# Patient Record
Sex: MALE | Race: BLACK OR AFRICAN AMERICAN | NOT HISPANIC OR LATINO | Employment: UNEMPLOYED | URBAN - METROPOLITAN AREA
[De-identification: names, ages, dates, MRNs, and addresses within clinical notes are randomized per-mention and may not be internally consistent; named-entity substitution may affect disease eponyms.]

---

## 2017-11-15 ENCOUNTER — HISTORICAL (OUTPATIENT)
Dept: ADMINISTRATIVE | Facility: HOSPITAL | Age: 37
End: 2017-11-15

## 2017-11-15 LAB
ABS NEUT (OLG): 4.02 X10(3)/MCL (ref 2.1–9.2)
ALBUMIN SERPL-MCNC: 3.8 GM/DL (ref 3.4–5)
ALBUMIN/GLOB SERPL: 1 RATIO (ref 1–2)
ALP SERPL-CCNC: 137 UNIT/L (ref 45–117)
ALT SERPL-CCNC: 25 UNIT/L (ref 12–78)
AST SERPL-CCNC: 16 UNIT/L (ref 15–37)
BASOPHILS # BLD AUTO: 0.06 X10(3)/MCL
BASOPHILS NFR BLD AUTO: 1 % (ref 0–1)
BILIRUB SERPL-MCNC: 0.5 MG/DL (ref 0.2–1)
BILIRUBIN DIRECT+TOT PNL SERPL-MCNC: 0.1 MG/DL
BILIRUBIN DIRECT+TOT PNL SERPL-MCNC: 0.4 MG/DL
BUN SERPL-MCNC: 6 MG/DL (ref 7–18)
CALCIUM SERPL-MCNC: 9.4 MG/DL (ref 8.5–10.1)
CHLORIDE SERPL-SCNC: 104 MMOL/L (ref 98–107)
CO2 SERPL-SCNC: 29 MMOL/L (ref 21–32)
CREAT SERPL-MCNC: 0.8 MG/DL (ref 0.6–1.3)
EOSINOPHIL # BLD AUTO: 0.07 X10(3)/MCL
EOSINOPHIL NFR BLD AUTO: 1 % (ref 0–5)
ERYTHROCYTE [DISTWIDTH] IN BLOOD BY AUTOMATED COUNT: 12.9 % (ref 11.5–14.5)
GLOBULIN SER-MCNC: 3.9 GM/ML (ref 2.3–3.5)
GLUCOSE SERPL-MCNC: 90 MG/DL (ref 74–106)
HCT VFR BLD AUTO: 60.1 % (ref 40–51)
HGB BLD-MCNC: 21.9 GM/DL (ref 13.5–17.5)
IMM GRANULOCYTES # BLD AUTO: 0.02 10*3/UL
IMM GRANULOCYTES NFR BLD AUTO: 0 %
LDH SERPL-CCNC: 170 UNIT/L (ref 87–241)
LYMPHOCYTES # BLD AUTO: 1.26 X10(3)/MCL
LYMPHOCYTES NFR BLD AUTO: 21 % (ref 15–40)
MCH RBC QN AUTO: 36.6 PG (ref 26–34)
MCHC RBC AUTO-ENTMCNC: 36.4 GM/DL (ref 31–37)
MCV RBC AUTO: 100.3 FL (ref 80–100)
MONOCYTES # BLD AUTO: 0.49 X10(3)/MCL
MONOCYTES NFR BLD AUTO: 8 % (ref 4–12)
NEUTROPHILS # BLD AUTO: 4.02 X10(3)/MCL
NEUTROPHILS NFR BLD AUTO: 68 X10(3)/MCL
PLATELET # BLD AUTO: 132 X10(3)/MCL (ref 130–400)
PMV BLD AUTO: 10.4 FL (ref 7.4–10.4)
POTASSIUM SERPL-SCNC: 3.8 MMOL/L (ref 3.5–5.1)
PROT SERPL-MCNC: 7.7 GM/DL (ref 6.4–8.2)
RBC # BLD AUTO: 5.99 X10(6)/MCL (ref 4.5–5.9)
SODIUM SERPL-SCNC: 140 MMOL/L (ref 136–145)
URATE SERPL-MCNC: 4.3 MG/DL (ref 3.5–7.2)
WBC # SPEC AUTO: 5.9 X10(3)/MCL (ref 4.5–11)

## 2017-11-20 ENCOUNTER — HISTORICAL (OUTPATIENT)
Dept: INFUSION THERAPY | Facility: HOSPITAL | Age: 37
End: 2017-11-20

## 2017-11-24 ENCOUNTER — HISTORICAL (OUTPATIENT)
Dept: RADIOLOGY | Facility: HOSPITAL | Age: 37
End: 2017-11-24

## 2017-11-27 ENCOUNTER — HISTORICAL (OUTPATIENT)
Dept: INFUSION THERAPY | Facility: HOSPITAL | Age: 37
End: 2017-11-27

## 2017-11-27 LAB
ABS NEUT (OLG): 2.95 X10(3)/MCL (ref 2.1–9.2)
ALBUMIN SERPL-MCNC: 3.7 GM/DL (ref 3.4–5)
ALBUMIN/GLOB SERPL: 1 RATIO (ref 1–2)
ALP SERPL-CCNC: 123 UNIT/L (ref 45–117)
ALT SERPL-CCNC: 29 UNIT/L (ref 12–78)
AST SERPL-CCNC: 24 UNIT/L (ref 15–37)
BASOPHILS # BLD AUTO: 0.06 X10(3)/MCL
BASOPHILS NFR BLD AUTO: 1 % (ref 0–1)
BILIRUB SERPL-MCNC: 0.6 MG/DL (ref 0.2–1)
BILIRUBIN DIRECT+TOT PNL SERPL-MCNC: 0.2 MG/DL
BILIRUBIN DIRECT+TOT PNL SERPL-MCNC: 0.4 MG/DL
BUN SERPL-MCNC: 5 MG/DL (ref 7–18)
CALCIUM SERPL-MCNC: 8.8 MG/DL (ref 8.5–10.1)
CHLORIDE SERPL-SCNC: 105 MMOL/L (ref 98–107)
CO2 SERPL-SCNC: 29 MMOL/L (ref 21–32)
CREAT SERPL-MCNC: 1 MG/DL (ref 0.6–1.3)
EOSINOPHIL # BLD AUTO: 0.05 10*3/UL
EOSINOPHIL NFR BLD AUTO: 1 % (ref 0–5)
ERYTHROCYTE [DISTWIDTH] IN BLOOD BY AUTOMATED COUNT: 13 % (ref 11.5–14.5)
GLOBULIN SER-MCNC: 3.7 GM/ML (ref 2.3–3.5)
GLUCOSE SERPL-MCNC: 99 MG/DL (ref 74–106)
HCT VFR BLD AUTO: 58.2 % (ref 40–51)
HGB BLD-MCNC: 21.1 GM/DL (ref 13.5–17.5)
IMM GRANULOCYTES # BLD AUTO: 0.02 10*3/UL
IMM GRANULOCYTES NFR BLD AUTO: 0 %
LYMPHOCYTES # BLD AUTO: 1.39 X10(3)/MCL
LYMPHOCYTES NFR BLD AUTO: 27 % (ref 15–40)
MCH RBC QN AUTO: 36.1 PG (ref 26–34)
MCHC RBC AUTO-ENTMCNC: 36.3 GM/DL (ref 31–37)
MCV RBC AUTO: 99.5 FL (ref 80–100)
MONOCYTES # BLD AUTO: 0.6 X10(3)/MCL
MONOCYTES NFR BLD AUTO: 12 % (ref 4–12)
NEUTROPHILS # BLD AUTO: 2.95 X10(3)/MCL
NEUTROPHILS NFR BLD AUTO: 58 X10(3)/MCL
PLATELET # BLD AUTO: 158 X10(3)/MCL (ref 130–400)
PMV BLD AUTO: 10.1 FL (ref 7.4–10.4)
POTASSIUM SERPL-SCNC: 4.1 MMOL/L (ref 3.5–5.1)
PROT SERPL-MCNC: 7.4 GM/DL (ref 6.4–8.2)
RBC # BLD AUTO: 5.85 X10(6)/MCL (ref 4.5–5.9)
SODIUM SERPL-SCNC: 140 MMOL/L (ref 136–145)
WBC # SPEC AUTO: 5.1 X10(3)/MCL (ref 4.5–11)

## 2017-12-04 ENCOUNTER — HISTORICAL (OUTPATIENT)
Dept: INFUSION THERAPY | Facility: HOSPITAL | Age: 37
End: 2017-12-04

## 2017-12-04 LAB
ABS NEUT (OLG): 3.18 X10(3)/MCL (ref 2.1–9.2)
BASOPHILS # BLD AUTO: 0.06 X10(3)/MCL
BASOPHILS NFR BLD AUTO: 1 % (ref 0–1)
EOSINOPHIL # BLD AUTO: 0.03 10*3/UL
EOSINOPHIL NFR BLD AUTO: 1 % (ref 0–5)
ERYTHROCYTE [DISTWIDTH] IN BLOOD BY AUTOMATED COUNT: 12.3 % (ref 11.5–14.5)
HCT VFR BLD AUTO: 55.1 % (ref 40–51)
HGB BLD-MCNC: 20 GM/DL (ref 13.5–17.5)
IMM GRANULOCYTES # BLD AUTO: 0.01 10*3/UL
IMM GRANULOCYTES NFR BLD AUTO: 0 %
LYMPHOCYTES # BLD AUTO: 1.45 X10(3)/MCL
LYMPHOCYTES NFR BLD AUTO: 28 % (ref 15–40)
MCH RBC QN AUTO: 35.7 PG (ref 26–34)
MCHC RBC AUTO-ENTMCNC: 36.3 GM/DL (ref 31–37)
MCV RBC AUTO: 98.2 FL (ref 80–100)
MONOCYTES # BLD AUTO: 0.47 X10(3)/MCL
MONOCYTES NFR BLD AUTO: 9 % (ref 4–12)
NEUTROPHILS # BLD AUTO: 3.18 X10(3)/MCL
NEUTROPHILS NFR BLD AUTO: 61 X10(3)/MCL
PLATELET # BLD AUTO: 197 X10(3)/MCL (ref 130–400)
PMV BLD AUTO: 10.2 FL (ref 7.4–10.4)
RBC # BLD AUTO: 5.61 X10(6)/MCL (ref 4.5–5.9)
WBC # SPEC AUTO: 5.2 X10(3)/MCL (ref 4.5–11)

## 2017-12-11 ENCOUNTER — HISTORICAL (OUTPATIENT)
Dept: INFUSION THERAPY | Facility: HOSPITAL | Age: 37
End: 2017-12-11

## 2017-12-11 LAB
ABS NEUT (OLG): 4.99 X10(3)/MCL (ref 2.1–9.2)
BASOPHILS # BLD AUTO: 0.07 X10(3)/MCL
BASOPHILS NFR BLD AUTO: 1 % (ref 0–1)
EOSINOPHIL # BLD AUTO: 0.06 X10(3)/MCL
EOSINOPHIL NFR BLD AUTO: 1 % (ref 0–5)
ERYTHROCYTE [DISTWIDTH] IN BLOOD BY AUTOMATED COUNT: 12.6 % (ref 11.5–14.5)
HCT VFR BLD AUTO: 50.3 % (ref 40–51)
HGB BLD-MCNC: 18.3 GM/DL (ref 13.5–17.5)
IMM GRANULOCYTES # BLD AUTO: 0.02 10*3/UL
IMM GRANULOCYTES NFR BLD AUTO: 0 %
LYMPHOCYTES # BLD AUTO: 1.4 X10(3)/MCL
LYMPHOCYTES NFR BLD AUTO: 20 % (ref 15–40)
MCH RBC QN AUTO: 36.3 PG (ref 26–34)
MCHC RBC AUTO-ENTMCNC: 36.4 GM/DL (ref 31–37)
MCV RBC AUTO: 99.8 FL (ref 80–100)
MONOCYTES # BLD AUTO: 0.57 X10(3)/MCL
MONOCYTES NFR BLD AUTO: 8 % (ref 4–12)
NEUTROPHILS # BLD AUTO: 4.99 X10(3)/MCL
NEUTROPHILS NFR BLD AUTO: 70 X10(3)/MCL
PLATELET # BLD AUTO: 189 X10(3)/MCL (ref 130–400)
PMV BLD AUTO: 10.4 FL (ref 7.4–10.4)
RBC # BLD AUTO: 5.04 X10(6)/MCL (ref 4.5–5.9)
WBC # SPEC AUTO: 7.1 X10(3)/MCL (ref 4.5–11)

## 2017-12-14 ENCOUNTER — HISTORICAL (OUTPATIENT)
Dept: SURGERY | Facility: HOSPITAL | Age: 37
End: 2017-12-14

## 2017-12-14 LAB
ABS NEUT (OLG): 1.54 X10(3)/MCL (ref 2.1–9.2)
BASOPHILS # BLD AUTO: 0.06 X10(3)/MCL
BASOPHILS NFR BLD AUTO: 1 % (ref 0–1)
EOSINOPHIL # BLD AUTO: 0.16 10*3/UL
EOSINOPHIL NFR BLD AUTO: 4 % (ref 0–5)
ERYTHROCYTE [DISTWIDTH] IN BLOOD BY AUTOMATED COUNT: 12.1 % (ref 11.5–14.5)
HCT VFR BLD AUTO: 47 % (ref 40–51)
HGB BLD-MCNC: 16.6 GM/DL (ref 13.5–17.5)
IMM GRANULOCYTES # BLD AUTO: 0.01 10*3/UL
IMM GRANULOCYTES NFR BLD AUTO: 0 %
INR PPP: 0.9 (ref 0.9–1.2)
LYMPHOCYTES # BLD AUTO: 1.93 X10(3)/MCL
LYMPHOCYTES NFR BLD AUTO: 45 % (ref 15–40)
MCH RBC QN AUTO: 35.1 PG (ref 26–34)
MCHC RBC AUTO-ENTMCNC: 35.3 GM/DL (ref 31–37)
MCV RBC AUTO: 99.4 FL (ref 80–100)
MONOCYTES # BLD AUTO: 0.55 X10(3)/MCL
MONOCYTES NFR BLD AUTO: 13 % (ref 4–12)
NEUTROPHILS # BLD AUTO: 1.54 X10(3)/MCL
NEUTROPHILS NFR BLD AUTO: 36 X10(3)/MCL
PLATELET # BLD AUTO: 201 X10(3)/MCL (ref 130–400)
PMV BLD AUTO: 10.5 FL (ref 7.4–10.4)
PROTHROMBIN TIME: 12 SECOND(S) (ref 11.9–14.4)
RBC # BLD AUTO: 4.73 X10(6)/MCL (ref 4.5–5.9)
WBC # SPEC AUTO: 4.2 X10(3)/MCL (ref 4.5–11)

## 2017-12-18 ENCOUNTER — HISTORICAL (OUTPATIENT)
Dept: INFUSION THERAPY | Facility: HOSPITAL | Age: 37
End: 2017-12-18

## 2017-12-18 LAB
ABS NEUT (OLG): 3.11 X10(3)/MCL (ref 2.1–9.2)
BASOPHILS # BLD AUTO: 0.06 X10(3)/MCL
BASOPHILS NFR BLD AUTO: 1 % (ref 0–1)
EOSINOPHIL # BLD AUTO: 0.12 X10(3)/MCL
EOSINOPHIL NFR BLD AUTO: 2 % (ref 0–5)
ERYTHROCYTE [DISTWIDTH] IN BLOOD BY AUTOMATED COUNT: 12.2 % (ref 11.5–14.5)
HCT VFR BLD AUTO: 47.4 % (ref 40–51)
HGB BLD-MCNC: 17.1 GM/DL (ref 13.5–17.5)
IMM GRANULOCYTES # BLD AUTO: 0.01 10*3/UL
IMM GRANULOCYTES NFR BLD AUTO: 0 %
LYMPHOCYTES # BLD AUTO: 1.7 X10(3)/MCL
LYMPHOCYTES NFR BLD AUTO: 31 % (ref 15–40)
MCH RBC QN AUTO: 35.6 PG (ref 26–34)
MCHC RBC AUTO-ENTMCNC: 36.1 GM/DL (ref 31–37)
MCV RBC AUTO: 98.5 FL (ref 80–100)
MONOCYTES # BLD AUTO: 0.45 X10(3)/MCL
MONOCYTES NFR BLD AUTO: 8 % (ref 4–12)
NEUTROPHILS # BLD AUTO: 3.11 X10(3)/MCL
NEUTROPHILS NFR BLD AUTO: 57 X10(3)/MCL
PLATELET # BLD AUTO: 198 X10(3)/MCL (ref 130–400)
PMV BLD AUTO: 10.9 FL (ref 7.4–10.4)
RBC # BLD AUTO: 4.81 X10(6)/MCL (ref 4.5–5.9)
WBC # SPEC AUTO: 5.4 X10(3)/MCL (ref 4.5–11)

## 2017-12-26 ENCOUNTER — HISTORICAL (OUTPATIENT)
Dept: INFUSION THERAPY | Facility: HOSPITAL | Age: 37
End: 2017-12-26

## 2017-12-26 LAB
ABS NEUT (OLG): 3.81 X10(3)/MCL (ref 2.1–9.2)
BASOPHILS # BLD AUTO: 0.08 X10(3)/MCL
BASOPHILS NFR BLD AUTO: 1 % (ref 0–1)
EOSINOPHIL # BLD AUTO: 0.03 X10(3)/MCL
EOSINOPHIL NFR BLD AUTO: 0 % (ref 0–5)
ERYTHROCYTE [DISTWIDTH] IN BLOOD BY AUTOMATED COUNT: 11.9 % (ref 11.5–14.5)
HCT VFR BLD AUTO: 44.4 % (ref 40–51)
HGB BLD-MCNC: 15.9 GM/DL (ref 13.5–17.5)
IMM GRANULOCYTES # BLD AUTO: 0.02 10*3/UL
IMM GRANULOCYTES NFR BLD AUTO: 0 %
LYMPHOCYTES # BLD AUTO: 1.57 X10(3)/MCL
LYMPHOCYTES NFR BLD AUTO: 26 % (ref 15–40)
MCH RBC QN AUTO: 34.8 PG (ref 26–34)
MCHC RBC AUTO-ENTMCNC: 35.8 GM/DL (ref 31–37)
MCV RBC AUTO: 97.2 FL (ref 80–100)
MONOCYTES # BLD AUTO: 0.54 X10(3)/MCL
MONOCYTES NFR BLD AUTO: 9 % (ref 4–12)
NEUTROPHILS # BLD AUTO: 3.81 X10(3)/MCL
NEUTROPHILS NFR BLD AUTO: 63 X10(3)/MCL
PLATELET # BLD AUTO: 203 X10(3)/MCL (ref 130–400)
PMV BLD AUTO: 10.3 FL (ref 7.4–10.4)
RBC # BLD AUTO: 4.57 X10(6)/MCL (ref 4.5–5.9)
WBC # SPEC AUTO: 6 X10(3)/MCL (ref 4.5–11)

## 2018-01-02 ENCOUNTER — HISTORICAL (OUTPATIENT)
Dept: INFUSION THERAPY | Facility: HOSPITAL | Age: 38
End: 2018-01-02

## 2018-01-02 LAB
ABS NEUT (OLG): 3.53 X10(3)/MCL (ref 2.1–9.2)
ALBUMIN SERPL-MCNC: 3.8 GM/DL (ref 3.4–5)
ALBUMIN/GLOB SERPL: 1 RATIO (ref 1–2)
ALP SERPL-CCNC: 112 UNIT/L (ref 45–117)
ALT SERPL-CCNC: 18 UNIT/L (ref 12–78)
AST SERPL-CCNC: 21 UNIT/L (ref 15–37)
BASOPHILS # BLD AUTO: 0.05 X10(3)/MCL
BASOPHILS NFR BLD AUTO: 1 % (ref 0–1)
BILIRUB SERPL-MCNC: 0.5 MG/DL (ref 0.2–1)
BILIRUBIN DIRECT+TOT PNL SERPL-MCNC: 0.1 MG/DL
BILIRUBIN DIRECT+TOT PNL SERPL-MCNC: 0.4 MG/DL
BUN SERPL-MCNC: 7 MG/DL (ref 7–18)
CALCIUM SERPL-MCNC: 8.9 MG/DL (ref 8.5–10.1)
CHLORIDE SERPL-SCNC: 105 MMOL/L (ref 98–107)
CO2 SERPL-SCNC: 26 MMOL/L (ref 21–32)
CREAT SERPL-MCNC: 0.9 MG/DL (ref 0.6–1.3)
EOSINOPHIL # BLD AUTO: 0.11 10*3/UL
EOSINOPHIL NFR BLD AUTO: 2 % (ref 0–5)
ERYTHROCYTE [DISTWIDTH] IN BLOOD BY AUTOMATED COUNT: 11.3 % (ref 11.5–14.5)
GLOBULIN SER-MCNC: 4 GM/ML (ref 2.3–3.5)
GLUCOSE SERPL-MCNC: 92 MG/DL (ref 74–106)
HCT VFR BLD AUTO: 42.1 % (ref 40–51)
HGB BLD-MCNC: 15.1 GM/DL (ref 13.5–17.5)
IMM GRANULOCYTES # BLD AUTO: 0.01 10*3/UL
IMM GRANULOCYTES NFR BLD AUTO: 0 %
LYMPHOCYTES # BLD AUTO: 1.2 X10(3)/MCL
LYMPHOCYTES NFR BLD AUTO: 22 % (ref 15–40)
MCH RBC QN AUTO: 33.9 PG (ref 26–34)
MCHC RBC AUTO-ENTMCNC: 35.9 GM/DL (ref 31–37)
MCV RBC AUTO: 94.6 FL (ref 80–100)
MONOCYTES # BLD AUTO: 0.47 X10(3)/MCL
MONOCYTES NFR BLD AUTO: 9 % (ref 4–12)
NEUTROPHILS # BLD AUTO: 3.53 X10(3)/MCL
NEUTROPHILS NFR BLD AUTO: 66 X10(3)/MCL
PLATELET # BLD AUTO: 212 X10(3)/MCL (ref 130–400)
PMV BLD AUTO: 9.9 FL (ref 7.4–10.4)
POTASSIUM SERPL-SCNC: 3.7 MMOL/L (ref 3.5–5.1)
PROT SERPL-MCNC: 7.8 GM/DL (ref 6.4–8.2)
RBC # BLD AUTO: 4.45 X10(6)/MCL (ref 4.5–5.9)
SODIUM SERPL-SCNC: 140 MMOL/L (ref 136–145)
WBC # SPEC AUTO: 5.4 X10(3)/MCL (ref 4.5–11)

## 2018-01-08 ENCOUNTER — HISTORICAL (OUTPATIENT)
Dept: INFUSION THERAPY | Facility: HOSPITAL | Age: 38
End: 2018-01-08

## 2018-01-08 LAB
ABS NEUT (OLG): 2.23 X10(3)/MCL (ref 2.1–9.2)
BASOPHILS # BLD AUTO: 0.09 X10(3)/MCL
BASOPHILS NFR BLD AUTO: 2 % (ref 0–1)
EOSINOPHIL # BLD AUTO: 0.3 X10(3)/MCL
EOSINOPHIL NFR BLD AUTO: 6 % (ref 0–5)
ERYTHROCYTE [DISTWIDTH] IN BLOOD BY AUTOMATED COUNT: 11.3 % (ref 11.5–14.5)
HCT VFR BLD AUTO: 37 % (ref 40–51)
HGB BLD-MCNC: 13.3 GM/DL (ref 13.5–17.5)
IMM GRANULOCYTES # BLD AUTO: 0.01 10*3/UL
IMM GRANULOCYTES NFR BLD AUTO: 0 %
LYMPHOCYTES # BLD AUTO: 1.49 X10(3)/MCL
LYMPHOCYTES NFR BLD AUTO: 32 % (ref 15–40)
MCH RBC QN AUTO: 34.2 PG (ref 26–34)
MCHC RBC AUTO-ENTMCNC: 35.9 GM/DL (ref 31–37)
MCV RBC AUTO: 95.1 FL (ref 80–100)
MONOCYTES # BLD AUTO: 0.47 X10(3)/MCL
MONOCYTES NFR BLD AUTO: 10 % (ref 4–12)
NEUTROPHILS # BLD AUTO: 2.23 X10(3)/MCL
NEUTROPHILS NFR BLD AUTO: 49 X10(3)/MCL
PLATELET # BLD AUTO: 235 X10(3)/MCL (ref 130–400)
PMV BLD AUTO: 10.1 FL (ref 7.4–10.4)
RBC # BLD AUTO: 3.89 X10(6)/MCL (ref 4.5–5.9)
WBC # SPEC AUTO: 4.6 X10(3)/MCL (ref 4.5–11)

## 2018-01-15 ENCOUNTER — HISTORICAL (OUTPATIENT)
Dept: ADMINISTRATIVE | Facility: HOSPITAL | Age: 38
End: 2018-01-15

## 2018-01-15 LAB
ABS NEUT (OLG): 2.71 X10(3)/MCL (ref 2.1–9.2)
ALBUMIN SERPL-MCNC: 3.8 GM/DL (ref 3.4–5)
ALBUMIN/GLOB SERPL: 1 RATIO (ref 1–2)
ALP SERPL-CCNC: 107 UNIT/L (ref 45–117)
ALT SERPL-CCNC: 19 UNIT/L (ref 12–78)
AST SERPL-CCNC: 17 UNIT/L (ref 15–37)
BASOPHILS # BLD AUTO: 0.09 X10(3)/MCL
BASOPHILS NFR BLD AUTO: 2 % (ref 0–1)
BILIRUB SERPL-MCNC: 0.3 MG/DL (ref 0.2–1)
BILIRUBIN DIRECT+TOT PNL SERPL-MCNC: <0.1 MG/DL
BILIRUBIN DIRECT+TOT PNL SERPL-MCNC: ABNORMAL MG/DL
BUN SERPL-MCNC: 9 MG/DL (ref 7–18)
CALCIUM SERPL-MCNC: 8.6 MG/DL (ref 8.5–10.1)
CHLORIDE SERPL-SCNC: 106 MMOL/L (ref 98–107)
CO2 SERPL-SCNC: 27 MMOL/L (ref 21–32)
CREAT SERPL-MCNC: 1 MG/DL (ref 0.6–1.3)
EOSINOPHIL # BLD AUTO: 0.22 X10(3)/MCL
EOSINOPHIL NFR BLD AUTO: 4 % (ref 0–5)
ERYTHROCYTE [DISTWIDTH] IN BLOOD BY AUTOMATED COUNT: 11.3 % (ref 11.5–14.5)
GLOBULIN SER-MCNC: 4 GM/ML (ref 2.3–3.5)
GLUCOSE SERPL-MCNC: 104 MG/DL (ref 74–106)
HCT VFR BLD AUTO: 38.7 % (ref 40–51)
HGB BLD-MCNC: 13.7 GM/DL (ref 13.5–17.5)
IMM GRANULOCYTES # BLD AUTO: 0.01 10*3/UL
IMM GRANULOCYTES NFR BLD AUTO: 0 %
LYMPHOCYTES # BLD AUTO: 1.54 X10(3)/MCL
LYMPHOCYTES NFR BLD AUTO: 31 % (ref 15–40)
MCH RBC QN AUTO: 32.9 PG (ref 26–34)
MCHC RBC AUTO-ENTMCNC: 35.4 GM/DL (ref 31–37)
MCV RBC AUTO: 93 FL (ref 80–100)
MONOCYTES # BLD AUTO: 0.43 X10(3)/MCL
MONOCYTES NFR BLD AUTO: 9 % (ref 4–12)
NEUTROPHILS # BLD AUTO: 2.71 X10(3)/MCL
NEUTROPHILS NFR BLD AUTO: 54 X10(3)/MCL
PLATELET # BLD AUTO: 221 X10(3)/MCL (ref 130–400)
PMV BLD AUTO: 10.1 FL (ref 7.4–10.4)
POTASSIUM SERPL-SCNC: 3.7 MMOL/L (ref 3.5–5.1)
PROT SERPL-MCNC: 7.8 GM/DL (ref 6.4–8.2)
RBC # BLD AUTO: 4.16 X10(6)/MCL (ref 4.5–5.9)
SODIUM SERPL-SCNC: 142 MMOL/L (ref 136–145)
WBC # SPEC AUTO: 5 X10(3)/MCL (ref 4.5–11)

## 2018-02-15 ENCOUNTER — HISTORICAL (OUTPATIENT)
Dept: HEMATOLOGY/ONCOLOGY | Facility: CLINIC | Age: 38
End: 2018-02-15

## 2018-02-15 LAB
ABS NEUT (OLG): 1.68 X10(3)/MCL (ref 2.1–9.2)
BASOPHILS # BLD AUTO: 0.09 X10(3)/MCL
BASOPHILS NFR BLD AUTO: 2 % (ref 0–1)
EOSINOPHIL # BLD AUTO: 0.29 X10(3)/MCL
EOSINOPHIL NFR BLD AUTO: 7 % (ref 0–5)
ERYTHROCYTE [DISTWIDTH] IN BLOOD BY AUTOMATED COUNT: 12.1 % (ref 11.5–14.5)
HCT VFR BLD AUTO: 38 % (ref 40–51)
HGB BLD-MCNC: 13.5 GM/DL (ref 13.5–17.5)
IMM GRANULOCYTES # BLD AUTO: 0.01 10*3/UL
IMM GRANULOCYTES NFR BLD AUTO: 0 %
LYMPHOCYTES # BLD AUTO: 1.51 X10(3)/MCL
LYMPHOCYTES NFR BLD AUTO: 38 % (ref 15–40)
MCH RBC QN AUTO: 31.7 PG (ref 26–34)
MCHC RBC AUTO-ENTMCNC: 35.5 GM/DL (ref 31–37)
MCV RBC AUTO: 89.2 FL (ref 80–100)
MONOCYTES # BLD AUTO: 0.44 X10(3)/MCL
MONOCYTES NFR BLD AUTO: 11 % (ref 4–12)
NEUTROPHILS # BLD AUTO: 1.68 X10(3)/MCL
NEUTROPHILS NFR BLD AUTO: 42 X10(3)/MCL
PLATELET # BLD AUTO: 196 X10(3)/MCL (ref 130–400)
PMV BLD AUTO: 9.7 FL (ref 7.4–10.4)
RBC # BLD AUTO: 4.26 X10(6)/MCL (ref 4.5–5.9)
WBC # SPEC AUTO: 4 X10(3)/MCL (ref 4.5–11)

## 2018-04-19 ENCOUNTER — HISTORICAL (OUTPATIENT)
Dept: ADMINISTRATIVE | Facility: HOSPITAL | Age: 38
End: 2018-04-19

## 2018-04-19 LAB
ABS NEUT (OLG): 2.35 X10(3)/MCL (ref 2.1–9.2)
ALBUMIN SERPL-MCNC: 3.6 GM/DL (ref 3.4–5)
ALBUMIN/GLOB SERPL: 1 RATIO (ref 1–2)
ALP SERPL-CCNC: 116 UNIT/L (ref 45–117)
ALT SERPL-CCNC: 50 UNIT/L (ref 12–78)
AST SERPL-CCNC: 50 UNIT/L (ref 15–37)
BASOPHILS # BLD AUTO: 0.05 X10(3)/MCL
BASOPHILS NFR BLD AUTO: 1 %
BILIRUB SERPL-MCNC: 0.4 MG/DL (ref 0.2–1)
BILIRUBIN DIRECT+TOT PNL SERPL-MCNC: 0.1 MG/DL
BILIRUBIN DIRECT+TOT PNL SERPL-MCNC: 0.3 MG/DL
BUN SERPL-MCNC: 8 MG/DL (ref 7–18)
CALCIUM SERPL-MCNC: 8.5 MG/DL (ref 8.5–10.1)
CHLORIDE SERPL-SCNC: 110 MMOL/L (ref 98–107)
CO2 SERPL-SCNC: 26 MMOL/L (ref 21–32)
CREAT SERPL-MCNC: 1 MG/DL (ref 0.6–1.3)
EOSINOPHIL # BLD AUTO: 0.09 10*3/UL
EOSINOPHIL NFR BLD AUTO: 2 %
ERYTHROCYTE [DISTWIDTH] IN BLOOD BY AUTOMATED COUNT: 13 % (ref 11.5–14.5)
GLOBULIN SER-MCNC: 4.1 GM/ML (ref 2.3–3.5)
GLUCOSE SERPL-MCNC: 112 MG/DL (ref 74–106)
HCT VFR BLD AUTO: 41.8 % (ref 40–51)
HGB BLD-MCNC: 14.5 GM/DL (ref 13.5–17.5)
IMM GRANULOCYTES # BLD AUTO: 0.01 10*3/UL
IMM GRANULOCYTES NFR BLD AUTO: 0 %
LYMPHOCYTES # BLD AUTO: 1.31 X10(3)/MCL
LYMPHOCYTES NFR BLD AUTO: 30 % (ref 13–40)
MCH RBC QN AUTO: 30.1 PG (ref 26–34)
MCHC RBC AUTO-ENTMCNC: 34.7 GM/DL (ref 31–37)
MCV RBC AUTO: 86.9 FL (ref 80–100)
MONOCYTES # BLD AUTO: 0.49 X10(3)/MCL
MONOCYTES NFR BLD AUTO: 11 % (ref 4–12)
NEUTROPHILS # BLD AUTO: 2.35 X10(3)/MCL
NEUTROPHILS NFR BLD AUTO: 55 X10(3)/MCL
PLATELET # BLD AUTO: 197 X10(3)/MCL (ref 130–400)
PMV BLD AUTO: 10 FL (ref 7.4–10.4)
POTASSIUM SERPL-SCNC: 3.6 MMOL/L (ref 3.5–5.1)
PROT SERPL-MCNC: 7.7 GM/DL (ref 6.4–8.2)
RBC # BLD AUTO: 4.81 X10(6)/MCL (ref 4.5–5.9)
SODIUM SERPL-SCNC: 142 MMOL/L (ref 136–145)
WBC # SPEC AUTO: 4.3 X10(3)/MCL (ref 4.5–11)

## 2018-07-23 ENCOUNTER — HISTORICAL (OUTPATIENT)
Dept: ADMINISTRATIVE | Facility: HOSPITAL | Age: 38
End: 2018-07-23

## 2018-07-23 LAB
ABS NEUT (OLG): 1.97 X10(3)/MCL (ref 2.1–9.2)
ALBUMIN SERPL-MCNC: 3.7 GM/DL (ref 3.4–5)
ALBUMIN/GLOB SERPL: 1 RATIO (ref 1–2)
ALP SERPL-CCNC: 122 UNIT/L (ref 45–117)
ALT SERPL-CCNC: 66 UNIT/L (ref 12–78)
AST SERPL-CCNC: 67 UNIT/L (ref 15–37)
BASOPHILS # BLD AUTO: 0.07 X10(3)/MCL
BASOPHILS NFR BLD AUTO: 2 %
BILIRUB SERPL-MCNC: 0.3 MG/DL (ref 0.2–1)
BILIRUBIN DIRECT+TOT PNL SERPL-MCNC: <0.1 MG/DL
BILIRUBIN DIRECT+TOT PNL SERPL-MCNC: ABNORMAL MG/DL
BUN SERPL-MCNC: 10 MG/DL (ref 7–18)
CALCIUM SERPL-MCNC: 8.3 MG/DL (ref 8.5–10.1)
CHLORIDE SERPL-SCNC: 107 MMOL/L (ref 98–107)
CO2 SERPL-SCNC: 25 MMOL/L (ref 21–32)
CREAT SERPL-MCNC: 1.1 MG/DL (ref 0.6–1.3)
EOSINOPHIL # BLD AUTO: 0.13 X10(3)/MCL
EOSINOPHIL NFR BLD AUTO: 3 %
ERYTHROCYTE [DISTWIDTH] IN BLOOD BY AUTOMATED COUNT: 12.3 % (ref 11.5–14.5)
GLOBULIN SER-MCNC: 4.1 GM/ML (ref 2.3–3.5)
GLUCOSE SERPL-MCNC: 104 MG/DL (ref 74–106)
HCT VFR BLD AUTO: 46.5 % (ref 40–51)
HGB BLD-MCNC: 15.8 GM/DL (ref 13.5–17.5)
IMM GRANULOCYTES # BLD AUTO: 0.01 10*3/UL
IMM GRANULOCYTES NFR BLD AUTO: 0 %
LYMPHOCYTES # BLD AUTO: 1.52 X10(3)/MCL
LYMPHOCYTES NFR BLD AUTO: 36 % (ref 13–40)
MCH RBC QN AUTO: 29.8 PG (ref 26–34)
MCHC RBC AUTO-ENTMCNC: 34 GM/DL (ref 31–37)
MCV RBC AUTO: 87.7 FL (ref 80–100)
MONOCYTES # BLD AUTO: 0.57 X10(3)/MCL
MONOCYTES NFR BLD AUTO: 13 % (ref 4–12)
NEUTROPHILS # BLD AUTO: 1.97 X10(3)/MCL
NEUTROPHILS NFR BLD AUTO: 46 X10(3)/MCL
PLATELET # BLD AUTO: 184 X10(3)/MCL (ref 130–400)
PMV BLD AUTO: 10.7 FL (ref 7.4–10.4)
POTASSIUM SERPL-SCNC: 4.1 MMOL/L (ref 3.5–5.1)
PROT SERPL-MCNC: 7.8 GM/DL (ref 6.4–8.2)
RBC # BLD AUTO: 5.3 X10(6)/MCL (ref 4.5–5.9)
SODIUM SERPL-SCNC: 140 MMOL/L (ref 136–145)
WBC # SPEC AUTO: 4.3 X10(3)/MCL (ref 4.5–11)

## 2019-03-12 ENCOUNTER — HISTORICAL (OUTPATIENT)
Dept: INFUSION THERAPY | Facility: HOSPITAL | Age: 39
End: 2019-03-12

## 2019-07-15 ENCOUNTER — HISTORICAL (OUTPATIENT)
Dept: INFUSION THERAPY | Facility: HOSPITAL | Age: 39
End: 2019-07-15

## 2019-10-14 ENCOUNTER — HISTORICAL (OUTPATIENT)
Dept: INFUSION THERAPY | Facility: HOSPITAL | Age: 39
End: 2019-10-14

## 2020-04-17 ENCOUNTER — HISTORICAL (OUTPATIENT)
Dept: HEMATOLOGY/ONCOLOGY | Facility: CLINIC | Age: 40
End: 2020-04-17

## 2020-04-21 ENCOUNTER — HISTORICAL (OUTPATIENT)
Dept: INFUSION THERAPY | Facility: HOSPITAL | Age: 40
End: 2020-04-21

## 2020-07-09 ENCOUNTER — HISTORICAL (OUTPATIENT)
Dept: INFUSION THERAPY | Facility: HOSPITAL | Age: 40
End: 2020-07-09

## 2020-07-13 ENCOUNTER — HISTORICAL (OUTPATIENT)
Dept: INFUSION THERAPY | Facility: HOSPITAL | Age: 40
End: 2020-07-13

## 2020-07-20 ENCOUNTER — HISTORICAL (OUTPATIENT)
Dept: INFUSION THERAPY | Facility: HOSPITAL | Age: 40
End: 2020-07-20

## 2020-07-27 ENCOUNTER — HISTORICAL (OUTPATIENT)
Dept: INFUSION THERAPY | Facility: HOSPITAL | Age: 40
End: 2020-07-27

## 2020-08-03 ENCOUNTER — HISTORICAL (OUTPATIENT)
Dept: INFUSION THERAPY | Facility: HOSPITAL | Age: 40
End: 2020-08-03

## 2020-08-11 ENCOUNTER — HISTORICAL (OUTPATIENT)
Dept: INFUSION THERAPY | Facility: HOSPITAL | Age: 40
End: 2020-08-11

## 2020-08-17 ENCOUNTER — HISTORICAL (OUTPATIENT)
Dept: ADMINISTRATIVE | Facility: HOSPITAL | Age: 40
End: 2020-08-17

## 2020-09-14 ENCOUNTER — HISTORICAL (OUTPATIENT)
Dept: HEMATOLOGY/ONCOLOGY | Facility: CLINIC | Age: 40
End: 2020-09-14

## 2020-12-14 ENCOUNTER — HISTORICAL (OUTPATIENT)
Dept: INFUSION THERAPY | Facility: HOSPITAL | Age: 40
End: 2020-12-14

## 2021-01-04 ENCOUNTER — HISTORICAL (OUTPATIENT)
Dept: HEMATOLOGY/ONCOLOGY | Facility: CLINIC | Age: 41
End: 2021-01-04

## 2021-04-19 ENCOUNTER — HISTORICAL (OUTPATIENT)
Dept: INFUSION THERAPY | Facility: HOSPITAL | Age: 41
End: 2021-04-19

## 2021-05-17 ENCOUNTER — HISTORICAL (OUTPATIENT)
Dept: HEMATOLOGY/ONCOLOGY | Facility: CLINIC | Age: 41
End: 2021-05-17

## 2021-07-06 ENCOUNTER — HISTORICAL (OUTPATIENT)
Dept: HEMATOLOGY/ONCOLOGY | Facility: CLINIC | Age: 41
End: 2021-07-06

## 2021-08-16 ENCOUNTER — HISTORICAL (OUTPATIENT)
Dept: INFUSION THERAPY | Facility: HOSPITAL | Age: 41
End: 2021-08-16

## 2021-11-22 ENCOUNTER — HISTORICAL (OUTPATIENT)
Dept: INFUSION THERAPY | Facility: HOSPITAL | Age: 41
End: 2021-11-22

## 2022-01-24 ENCOUNTER — HISTORICAL (OUTPATIENT)
Dept: ADMINISTRATIVE | Facility: HOSPITAL | Age: 42
End: 2022-01-24

## 2022-02-07 ENCOUNTER — HISTORICAL (OUTPATIENT)
Dept: INFUSION THERAPY | Facility: HOSPITAL | Age: 42
End: 2022-02-07

## 2022-02-07 LAB
ABS NEUT (OLG): 3.16 (ref 2.1–9.2)
ALBUMIN SERPL-MCNC: 3.9 G/DL (ref 3.5–5)
ALBUMIN/GLOB SERPL: 1.1 {RATIO} (ref 1.1–2)
ALP SERPL-CCNC: 94 U/L (ref 40–150)
ALT SERPL-CCNC: 16 U/L (ref 0–55)
AST SERPL-CCNC: 19 U/L (ref 5–34)
BASOPHILS # BLD AUTO: 0 10*3/UL (ref 0–0.2)
BASOPHILS NFR BLD AUTO: 1 %
BILIRUB SERPL-MCNC: 0.4 MG/DL
BILIRUBIN DIRECT+TOT PNL SERPL-MCNC: 0.2 (ref 0–0.5)
BILIRUBIN DIRECT+TOT PNL SERPL-MCNC: 0.2 (ref 0–0.8)
BUN SERPL-MCNC: 7.7 MG/DL (ref 8.9–20.6)
CALCIUM SERPL-MCNC: 9.4 MG/DL (ref 8.7–10.5)
CHLORIDE SERPL-SCNC: 107 MMOL/L (ref 98–107)
CO2 SERPL-SCNC: 23 MMOL/L (ref 22–29)
CREAT SERPL-MCNC: 1.01 MG/DL (ref 0.73–1.18)
EOSINOPHIL # BLD AUTO: 0 10*3/UL (ref 0–0.9)
EOSINOPHIL NFR BLD AUTO: 1 %
ERYTHROCYTE [DISTWIDTH] IN BLOOD BY AUTOMATED COUNT: 15.9 % (ref 11.5–14.5)
FLAG2 (OHS): 60
FLAG3 (OHS): 80
FLAGS (OHS): 80
GLOBULIN SER-MCNC: 3.6 G/DL (ref 2.4–3.5)
GLUCOSE SERPL-MCNC: 88 MG/DL (ref 74–100)
HCT VFR BLD AUTO: 43.8 % (ref 40–51)
HEMOLYSIS INTERF INDEX SERPL-ACNC: 12
HGB BLD-MCNC: 14.1 G/DL (ref 13.5–17.5)
ICTERIC INTERF INDEX SERPL-ACNC: 0
IMM GRANULOCYTES # BLD AUTO: 0.01 10*3/UL
IMM GRANULOCYTES NFR BLD AUTO: 0 %
IMM. NE 2 SUSPECT FLAG (OHS): 10
LIPEMIC INTERF INDEX SERPL-ACNC: 3
LOW EVENT # SUSPECT FLAG (OHS): 80
LYMPHOCYTES # BLD AUTO: 1.3 10*3/UL (ref 0.6–4.6)
LYMPHOCYTES NFR BLD AUTO: 25 %
MANUAL DIFF? (OHS): NO
MCH RBC QN AUTO: 26.9 PG (ref 26–34)
MCHC RBC AUTO-ENTMCNC: 32.2 G/DL (ref 31–37)
MCV RBC AUTO: 83.6 FL (ref 80–100)
MO BLASTS SUSPECT FLAG (OHS): 30
MONOCYTES # BLD AUTO: 0.5 10*3/UL (ref 0.1–1.3)
MONOCYTES NFR BLD AUTO: 10 %
NEUTROPHILS # BLD AUTO: 3.16 10*3/UL (ref 2.1–9.2)
NEUTROPHILS NFR BLD AUTO: 63 %
NRBC BLD AUTO-RTO: 0 % (ref 0–0.2)
PLATELET # BLD AUTO: 259 10*3/UL (ref 130–400)
PLATELET CLUMPS SUSPECT FLAG (OHS): 10
PMV BLD AUTO: 9.6 FL (ref 7.4–10.4)
POTASSIUM SERPL-SCNC: 4 MMOL/L (ref 3.5–5.1)
PROT SERPL-MCNC: 7.5 G/DL (ref 6.4–8.3)
RBC # BLD AUTO: 5.24 10*6/UL (ref 4.5–5.9)
SODIUM SERPL-SCNC: 140 MMOL/L (ref 136–145)
WBC # SPEC AUTO: 5 10*3/UL (ref 4.5–11)

## 2022-02-21 ENCOUNTER — HISTORICAL (OUTPATIENT)
Dept: ADMINISTRATIVE | Facility: HOSPITAL | Age: 42
End: 2022-02-21

## 2022-02-21 LAB
ABS NEUT (OLG): 2.68 (ref 2.1–9.2)
BASOPHILS # BLD AUTO: 0 10*3/UL (ref 0–0.2)
BASOPHILS NFR BLD AUTO: 1 %
EOSINOPHIL # BLD AUTO: 0.1 10*3/UL (ref 0–0.9)
EOSINOPHIL NFR BLD AUTO: 1 %
ERYTHROCYTE [DISTWIDTH] IN BLOOD BY AUTOMATED COUNT: 15.3 % (ref 11.5–14.5)
FLAG2 (OHS): 60
FLAG3 (OHS): 80
FLAGS (OHS): 90
HCT VFR BLD AUTO: 44.1 % (ref 40–51)
HGB BLD-MCNC: 13.8 G/DL (ref 13.5–17.5)
IMM GRANULOCYTES # BLD AUTO: 0.01 10*3/UL
IMM GRANULOCYTES NFR BLD AUTO: 0 %
IMM. NE 2 SUSPECT FLAG (OHS): 30
LOW EVENT # SUSPECT FLAG (OHS): 90
LYMPHOCYTES # BLD AUTO: 1 10*3/UL (ref 0.6–4.6)
LYMPHOCYTES NFR BLD AUTO: 25 %
MANUAL DIFF? (OHS): NO
MCH RBC QN AUTO: 25.9 PG (ref 26–34)
MCHC RBC AUTO-ENTMCNC: 31.3 G/DL (ref 31–37)
MCV RBC AUTO: 82.9 FL (ref 80–100)
MO BLASTS SUSPECT FLAG (OHS): 40
MONOCYTES # BLD AUTO: 0.4 10*3/UL (ref 0.1–1.3)
MONOCYTES NFR BLD AUTO: 9 %
NEUTROPHILS # BLD AUTO: 2.68 10*3/UL (ref 2.1–9.2)
NEUTROPHILS NFR BLD AUTO: 64 %
NRBC BLD AUTO-RTO: 0 % (ref 0–0.2)
PLATELET # BLD AUTO: 254 10*3/UL (ref 130–400)
PMV BLD AUTO: 9.6 FL (ref 7.4–10.4)
RBC # BLD AUTO: 5.32 10*6/UL (ref 4.5–5.9)
WBC # SPEC AUTO: 4.2 10*3/UL (ref 4.5–11)

## 2022-02-22 ENCOUNTER — HISTORICAL (OUTPATIENT)
Dept: ADMINISTRATIVE | Facility: HOSPITAL | Age: 42
End: 2022-02-22

## 2022-03-14 ENCOUNTER — HISTORICAL (OUTPATIENT)
Dept: ADMINISTRATIVE | Facility: HOSPITAL | Age: 42
End: 2022-03-14

## 2022-03-14 ENCOUNTER — HISTORICAL (OUTPATIENT)
Dept: RADIOLOGY | Facility: HOSPITAL | Age: 42
End: 2022-03-14

## 2022-03-21 ENCOUNTER — HISTORICAL (OUTPATIENT)
Dept: INFUSION THERAPY | Facility: HOSPITAL | Age: 42
End: 2022-03-21

## 2022-03-21 LAB
ABS NEUT (OLG): 4.96 (ref 2.1–9.2)
BASOPHILS # BLD AUTO: 0 10*3/UL (ref 0–0.2)
BASOPHILS NFR BLD AUTO: 1 %
EOSINOPHIL # BLD AUTO: 0 10*3/UL (ref 0–0.9)
EOSINOPHIL NFR BLD AUTO: 0 %
ERYTHROCYTE [DISTWIDTH] IN BLOOD BY AUTOMATED COUNT: 15.8 % (ref 11.5–14.5)
FLAG2 (OHS): 60
FLAG3 (OHS): 80
FLAGS (OHS): 90
HCT VFR BLD AUTO: 45.2 % (ref 40–51)
HGB BLD-MCNC: 14.5 G/DL (ref 13.5–17.5)
IMM GRANULOCYTES # BLD AUTO: 0.02 10*3/UL
IMM GRANULOCYTES NFR BLD AUTO: 0 %
LOW EVENT # SUSPECT FLAG (OHS): 90
LYMPHOCYTES # BLD AUTO: 1.3 10*3/UL (ref 0.6–4.6)
LYMPHOCYTES NFR BLD AUTO: 19 %
MANUAL DIFF? (OHS): NO
MCH RBC QN AUTO: 26.1 PG (ref 26–34)
MCHC RBC AUTO-ENTMCNC: 32.1 G/DL (ref 31–37)
MCV RBC AUTO: 81.4 FL (ref 80–100)
MO BLASTS SUSPECT FLAG (OHS): 30
MONOCYTES # BLD AUTO: 0.4 10*3/UL (ref 0.1–1.3)
MONOCYTES NFR BLD AUTO: 7 %
NEUTROPHILS # BLD AUTO: 4.96 10*3/UL (ref 2.1–9.2)
NEUTROPHILS NFR BLD AUTO: 73 %
NRBC BLD AUTO-RTO: 0 % (ref 0–0.2)
PLATELET # BLD AUTO: 253 10*3/UL (ref 130–400)
PLATELET CLUMPS SUSPECT FLAG (OHS): 10
PMV BLD AUTO: 9.1 FL (ref 7.4–10.4)
RBC # BLD AUTO: 5.55 10*6/UL (ref 4.5–5.9)
WBC # SPEC AUTO: 6.8 10*3/UL (ref 4.5–11)

## 2022-04-07 ENCOUNTER — HISTORICAL (OUTPATIENT)
Dept: ADMINISTRATIVE | Facility: HOSPITAL | Age: 42
End: 2022-04-07
Payer: MEDICAID

## 2022-04-18 ENCOUNTER — HISTORICAL (OUTPATIENT)
Dept: INFUSION THERAPY | Facility: HOSPITAL | Age: 42
End: 2022-04-18
Payer: MEDICAID

## 2022-04-18 LAB
ABS NEUT (OLG): 4.46 (ref 2.1–9.2)
ALBUMIN SERPL-MCNC: 4.1 G/DL (ref 3.5–5)
ALBUMIN/GLOB SERPL: 1 {RATIO} (ref 1.1–2)
ALP SERPL-CCNC: 96 U/L (ref 40–150)
ALT SERPL-CCNC: 25 U/L (ref 0–55)
AST SERPL-CCNC: 24 U/L (ref 5–34)
BASOPHILS # BLD AUTO: 0.1 10*3/UL (ref 0–0.2)
BASOPHILS NFR BLD AUTO: 1 %
BILIRUB SERPL-MCNC: 0.4 MG/DL
BILIRUBIN DIRECT+TOT PNL SERPL-MCNC: 0.2 (ref 0–0.5)
BILIRUBIN DIRECT+TOT PNL SERPL-MCNC: 0.2 (ref 0–0.8)
BUN SERPL-MCNC: 9 MG/DL (ref 8.9–20.6)
CALCIUM SERPL-MCNC: 9.7 MG/DL (ref 8.7–10.5)
CHLORIDE SERPL-SCNC: 103 MMOL/L (ref 98–107)
CO2 SERPL-SCNC: 22 MMOL/L (ref 22–29)
CREAT SERPL-MCNC: 0.93 MG/DL (ref 0.73–1.18)
EOSINOPHIL # BLD AUTO: 0 10*3/UL (ref 0–0.9)
EOSINOPHIL NFR BLD AUTO: 1 %
ERYTHROCYTE [DISTWIDTH] IN BLOOD BY AUTOMATED COUNT: 15.2 % (ref 11.5–14.5)
FLAG2 (OHS): 60
FLAG3 (OHS): 80
FLAGS (OHS): 90
GLOBULIN SER-MCNC: 4.1 G/DL (ref 2.4–3.5)
GLUCOSE SERPL-MCNC: 94 MG/DL (ref 74–100)
HCT VFR BLD AUTO: 45.1 % (ref 40–51)
HEMOLYSIS INTERF INDEX SERPL-ACNC: 2
HGB BLD-MCNC: 14.2 G/DL (ref 13.5–17.5)
ICTERIC INTERF INDEX SERPL-ACNC: 0
IMM GRANULOCYTES # BLD AUTO: 0.03 10*3/UL
IMM GRANULOCYTES NFR BLD AUTO: 0 %
IMM. NE 2 SUSPECT FLAG (OHS): 10
LIPEMIC INTERF INDEX SERPL-ACNC: 10
LOW EVENT # SUSPECT FLAG (OHS): 90
LYMPHOCYTES # BLD AUTO: 1.7 10*3/UL (ref 0.6–4.6)
LYMPHOCYTES NFR BLD AUTO: 25 %
MANUAL DIFF? (OHS): NO
MCH RBC QN AUTO: 24.9 PG (ref 26–34)
MCHC RBC AUTO-ENTMCNC: 31.5 G/DL (ref 31–37)
MCV RBC AUTO: 79 FL (ref 80–100)
MO BLASTS SUSPECT FLAG (OHS): 40
MONOCYTES # BLD AUTO: 0.7 10*3/UL (ref 0.1–1.3)
MONOCYTES NFR BLD AUTO: 10 %
NEUTROPHILS # BLD AUTO: 4.46 10*3/UL (ref 2.1–9.2)
NEUTROPHILS NFR BLD AUTO: 64 %
NRBC BLD AUTO-RTO: 0 % (ref 0–0.2)
PLATELET # BLD AUTO: 287 10*3/UL (ref 130–400)
PLATELET CLUMPS SUSPECT FLAG (OHS): 20
PMV BLD AUTO: 9.9 FL (ref 7.4–10.4)
POTASSIUM SERPL-SCNC: 4.6 MMOL/L (ref 3.5–5.1)
PROT SERPL-MCNC: 8.2 G/DL (ref 6.4–8.3)
RBC # BLD AUTO: 5.71 10*6/UL (ref 4.5–5.9)
SODIUM SERPL-SCNC: 133 MMOL/L (ref 136–145)
WBC # SPEC AUTO: 7 10*3/UL (ref 4.5–11)

## 2022-04-24 VITALS
SYSTOLIC BLOOD PRESSURE: 113 MMHG | DIASTOLIC BLOOD PRESSURE: 75 MMHG | HEIGHT: 65 IN | BODY MASS INDEX: 27.63 KG/M2 | OXYGEN SATURATION: 100 % | WEIGHT: 165.81 LBS

## 2022-04-28 NOTE — PROGRESS NOTES
Patient:   Sean Dunham            MRN: [id number]            FIN: [id number]               Age:   37 years     Sex:  Male     :  1980   Associated Diagnoses:   None   Author:   Oscar NIEVES, Taylor Romano      Chief Complaint      Visit Information   Problem list  Secondary erythrocytosis    HPI/Clinical History:  The patient is a pleasant 36-year-old -American man.  He was apparently tased in the month of 2017, and went to [facility] last month complaining of pain in the left upper abdominal quadrant.  They evaluated him from cardiac standpoint, and found an abnormal EKG, probably due to LVH from hypertension.  Incidentally, hemoglobin was noted to be markedly elevated, with mild thrombus cytopenia.  He also had minimally elevated troponin levels, without atypical rise and fall pattern of acute coronary syndrome.  He was evaluated by cardiology.    His admission hemoglobin on 10/23/2017 was 24 with hematocrit of 67.  After 2 phlebotomy treatments, each 500 cc, it dropped to 22.2 g percent.  Had mild thrombus cytopenia with platelet count of 99,000.  MCV was elevated to 100.7.  In CMP, alk phos was elevated to 129, and total bilirubin 5.7.    Says that for last 6 months or so, he noticed that his eyes were bloodshot.  After 2 therapeutic phlebotomy treatments, the redness in the eyes has subsided.  Denies history of headaches, stuffiness in the head, tinnitus, nosebleeds, echogenic pruritus, anorexia, weight loss, myalgias, blood clots, or bleeding.  No history of cardiopulmonary problems or chronic dyspnea.    Erythropoietin level 6.5 on 11/15/2017, not suppressed phthisis normal range 2.6-18.5).  Of no splenomegaly noted on abdominal ultrasound.       17: Presents for follow-up visit.  Says that so far, has had 3 phlebotomy treatments done.  Had to take naps after phlebotomy treatments, but no weakness fatigue or dizziness. His hemoglobin was 21.9 on November 15; down  to 21.1 on November 27.  Hematocrit was 60.1 and 58.2, respectively. LDH and uric acid levels within normal limits. Erythropoietin level 6.5 on 11/15/2017, not suppressed phthisis normal range 2.6-18.5).  Of no splenomegaly noted on abdominal ultrasound.      1/15/18: Presents for follow-up visit. Bone marrow aspiration and core biopsy on 12/14/2017 showed hypercellular bone marrow with mild increase in erythroid precursors, pending additional stains.  No evidence of monoclonality, acute leukemia, plasma cell dyscrasia, or lymphoproliferative disorder.  CALR mutation not detected.  Cytogenetics normal.  Reticulin stain showed no increase in reticulin fibers.  Decreased iron stores.  On January 8, hemoglobin 13.3, down from 15.1 on 01/02/2018, down from 17.1 on 12/18/2017, down from 20 on 12/04/2017.  He is undergoing periodic phlebotomy treatments, 500 cc each.  We discussed all results in detail.  He feels well, and denies any symptoms of concern like weakness, fatigue, lethargy, dizziness, epistaxis, shortness of breath, abdominal pains, nausea, vomiting, etc.  He is happy that he has stopped smoking altogether.    2/19/18: Presents for follow-up visit.  On February 15, his hemoglobin was 13.5, stable over the last month.  White count 4000/mm³, and neutrophil count 1680/mm³, slightly low because of unclear reason.  Doing well.  Endorses no symptoms of concern.  No weakness, fatigue, headaches, vision impairment, shortness of breath, abdominal pain, nausea, vomiting, etc.    4/18/28: Patient presents today for follow up on erythrocytosis. His labs today continue with no abnormality. Hgb 14.5, down from 21.9. Numbers resolved since he no longer is smoking. This is my first visit with this nice young gentleman. He is very concerned about his health and has taken initiative to be healthy. He has no symptoms, no itching, no headache or blurred vision. However, his blood pressure is elevated today but has been out of  his medication for 2 days. He is followed by [facility] and has appointment in the next several weeks.          Interval History     7/23/18: (Taylor Moore NP) Patient presents for 3 month follow up, monitoring for secondary erythrocytosis. 15.8/46.5. His numbers remain normal since he stopped smoking. He has no itching, headache, blurred vision.  His BP is elevated. He says its only elevated when he comes to the doctor. He admits to eating fast food and adding salt to his diet.          Histories   Past Medical History:    No active or resolved past medical history items have been selected or recorded.   Family History:    Kidney disease  Sister  Hypertension.  Father  , His sister has kidney problems.  Grandmother had some kind of cancer.   Procedure history:    none.   Social History        Social & Psychosocial Habits    Alcohol  11/15/2017  Use: Current    Type: Liquor    Frequency: 1-2 times per year    Employment/School  11/15/2017  Status: Employed    Description: [employer]    Highest education: High school    Tobacco  01/02/2018  Use: Current every day smoker    Type: Cigarettes    Tobacco use per day: 5    Started at age: 17.0 Years    Comment: quit 2 weeks ago. supplementing with vapor 2xdaily - 01/02/2018 15:39 - Len MERINO, Ena F  .     Single.  Has 2 children, ages 16 and 4.  Has been smoking about a pack of cigarettes daily for 17 years.  No alcohol or illicit drug abuse..        Review of Systems   12 point review of systems done in full with pertinent positives as described in interval history. Remainder of review of systems unremarkable.      Health Status   Allergies:    Allergic Reactions (Selected)  No Known Medication Allergies,    Allergies (1) Active Reaction  No Known Medication Allergies None Documented     Current medications:  (Selected)   Documented Medications  Documented  Fish Oil 1200 mg oral capsule: 1,200 mg, 1 cap(s), Oral, Daily, 0 Refill(s)  amlodipine 10 mg oral  tablet: 10 mg, 1 tab(s), Oral, Daily, 30 tab(s), 0 Refill(s)  aspirin 325 mg oral tablet: 325 mg, 1 tab(s), Oral, Daily, 0 Refill(s)   Problem list:    All Problems  Erythrocytosis / 960111 / Confirmed  Hypertension / 55149964 / Confirmed  Obesity / [id number] / Confirmed  Tobacco user / [id number] / Probable  Inactive: Polycythemia vera / [id number],    Active Problems (4)  Erythrocytosis   Hypertension   Obesity   Tobacco user         Physical Examination   Vital Signs   7/23/2018 9:11 CDT       Temperature Oral          37.0 DegC                             Temperature Oral (calculated)             98.60 DegF                             Peripheral Pulse Rate     75 bpm                             Respiratory Rate          18 br/min                             SpO2                      100 %                             Systolic Blood Pressure   170 mmHg  HI                             Diastolic Blood Pressure  98 mmHg  HI     General:  Alert and oriented, No acute distress.    Eye:  Pupils are equal, round and reactive to light, Extraocular movements are intact, Normal conjunctiva, No conjunctival injection..    HENT:  Normocephalic, Oral mucosa is moist, No pharyngeal erythema, No sinus tenderness.    Neck:  Supple, Non-tender, No jugular venous distention, No lymphadenopathy, No thyromegaly.    Respiratory:  Lungs are clear to auscultation, Respirations are non-labored, Breath sounds are equal, Symmetrical chest wall expansion, No chest wall tenderness.    Cardiovascular:  Normal rate, Regular rhythm, No murmur, No gallop.    Gastrointestinal:  Soft, Non-tender, Non-distended, No organomegaly, Liver and spleen not palpable..    Genitourinary:  No costovertebral angle tenderness, No inguinal tenderness.    Lymphatics:  No lymphadenopathy neck, axilla, groin.    Neurologic:  Alert, Oriented, Normal sensory, Normal motor function, No focal deficits.    Cognition and Speech:  Oriented, Speech clear and coherent,  Functional cognition intact.    Psychiatric:  Cooperative, Appropriate mood & affect, Normal judgment, Non-suicidal.       Review / Management   Results review   Laboratory Results   Today's Lab Results : PowerNote Discrete Results   7/23/2018 8:32 CDT       WBC                       4.3 x10(3)/mcL  LOW                             RBC                       5.30 x10(6)/mcL                             Hgb                       15.8 gm/dL                             Hct                       46.5 %                             Platelet                  184 x10(3)/mcL                             MCV                       87.7 fL                             MCH                       29.8 pg                             MCHC                      34.0 gm/dL                             RDW                       12.3 %                             MPV                       10.7 fL  HI                             Abs Neut                  1.97 x10(3)/mcL  LOW                             Neutro Auto               46 x10(3)/mcL  NA                             Lymph Auto                36 %                             Mono Auto                 13 %  HI                             Eos Auto                  3  NA                             Abs Eos                   0.13 x10(3)/mcL  NA                             Basophil Auto             2  NA                             Abs Neutro                1.97 x10(3)/mcL  NA                             Abs Lymph                 1.52 x10(3)/mcL  NA                             Abs Mono                  0.57 x10(3)/mcL  NA                             Abs Baso                  0.07 x10(3)/mcL  NA                             IG%                       0 %  NA                             IG#                       0.0100  NA                             Sodium Lvl                140 mmol/L                             Potassium Lvl             4.1 mmol/L                             Chloride                   107 mmol/L                             CO2                       25 mmol/L                             Calcium Lvl               8.3 mg/dL  LOW                             Glucose Lvl               104 mg/dL                             BUN                       10 mg/dL                             Creatinine                1.10 mg/dL                             eGFR-AA                   98 mL/min                             eGFR-JEREMIAH                  81 mL/min  LOW                             Bili Total                0.3 mg/dL                             Bili Direct               <0.1 mg/dL                             Bili Indirect             Calc. not valid mg/dL                             AST                       67 unit/L  HI                             ALT                       66 unit/L                             Alk Phos                  122 unit/L  HI                             Total Protein             7.8 gm/dL                             Albumin Lvl               3.7 gm/dL                             Globulin                  4.10 gm/mL  HI                             A/G Ratio                 1 ratio           Impression and Plan      1. Secondary erythrocytosis, severe at diagnosis with hgb 21.9 (September 2017)      Prior 17-pack-year smoker but with no history of cardiopulmonary problems or dyspnea.      Erythropoietin level is not suppressed; this is a point against polycythemia vera.      Bone marrow examination does not suggest polycythemia vera.      Calretinin mutation negative in bone marrow.     JANESSA-2 mutation negative (both JANESSA-2 V617F, as well as exons 12-15 are negative).    In light of above, secondary erythrocytosis, although etiology remains unknown.  No evidence of polycythemia vera.  He has continued to abstain from smoking and concentrate on his health     2. Hypertension, recently uncontrolled. Takes amlodipine, but has been out for 2 days. He has follow up with [facility] to recheck.     Plan:  He has secondary erythrocytosis of unclear etiology.  No evidence of polycythemia vera.  At this time, he has been phlebotomized down to hemoglobin of 13.3.  No further therapeutic phlebotomy is indicated.  He has no symptoms of hyperviscosity state.  We will follow his CBC periodically.  For secondary erythrocytosis, he does not need to be put on a structured therapeutic phlebotomy program.     Hemoglobin is stable and at a desirable level, with no symptoms of hyperviscosity state whatsoever.  Continue follow up with Nataliia Patel  Return to heme clinic prn, however, patient would like to continue follow up here. RTC in 6 months with CBC, CMP

## 2022-04-28 NOTE — PROGRESS NOTES
Patient:   Sean Dunham            MRN: [id number]            FIN: [id number]               Age:   37 years     Sex:  Male     :  1980   Associated Diagnoses:   None   Author:   Taylor Moore NP      Chief Complaint   2018 9:05 CDT       erythrocytosis        Visit Information   Problem list  Secondary erythrocytosis    HPI/Clinical History:  The patient is a pleasant 36-year-old -American man.  He was apparently tased in the month of 2017, and went to [facility] last month complaining of pain in the left upper abdominal quadrant.  They evaluated him from cardiac standpoint, and found an abnormal EKG, probably due to LVH from hypertension.  Incidentally, hemoglobin was noted to be markedly elevated, with mild thrombus cytopenia.  He also had minimally elevated troponin levels, without atypical rise and fall pattern of acute coronary syndrome.  He was evaluated by cardiology.    His admission hemoglobin on 10/23/2017 was 24 with hematocrit of 67.  After 2 phlebotomy treatments, each 500 cc, it dropped to 22.2 g percent.  Had mild thrombus cytopenia with platelet count of 99,000.  MCV was elevated to 100.7.  In CMP, alk phos was elevated to 129, and total bilirubin 5.7.    Says that for last 6 months or so, he noticed that his eyes were bloodshot.  After 2 therapeutic phlebotomy treatments, the redness in the eyes has subsided.  Denies history of headaches, stuffiness in the head, tinnitus, nosebleeds, echogenic pruritus, anorexia, weight loss, myalgias, blood clots, or bleeding.  No history of cardiopulmonary problems or chronic dyspnea.    Erythropoietin level 6.5 on 11/15/2017, not suppressed phthisis normal range 2.6-18.5).  Of no splenomegaly noted on abdominal ultrasound.         Interval History     2017:  Presents for follow-up visit.  Says that so far, has had 3 phlebotomy treatments done.  Had to take naps after phlebotomy treatments, but no  weakness fatigue or dizziness. His hemoglobin was 21.9 on November 15; down to 21.1 on November 27.  Hematocrit was 60.1 and 58.2, respectively. LDH and uric acid levels within normal limits. Erythropoietin level 6.5 on 11/15/2017, not suppressed phthisis normal range 2.6-18.5).  Of no splenomegaly noted on abdominal ultrasound.      01/15/2018:      Presents for follow-up visit. Bone marrow aspiration and core biopsy on 12/14/2017 showed hypercellular bone marrow with mild increase in erythroid precursors, pending additional stains.  No evidence of monoclonality, acute leukemia, plasma cell dyscrasia, or lymphoproliferative disorder.  CALR mutation not detected.  Cytogenetics normal.  Reticulin stain showed no increase in reticulin fibers.  Decreased iron stores.  On January 8, hemoglobin 13.3, down from 15.1 on 01/02/2018, down from 17.1 on 12/18/2017, down from 20 on 12/04/2017.  He is undergoing periodic phlebotomy treatments, 500 cc each.  We discussed all results in detail.  He feels well, and denies any symptoms of concern like weakness, fatigue, lethargy, dizziness, epistaxis, shortness of breath, abdominal pains, nausea, vomiting, etc.  He is happy that he has stopped smoking altogether.    02/19/2018:  Presents for follow-up visit.  On February 15, his hemoglobin was 13.5, stable over the last month.  White count 4000/mm³, and neutrophil count 1680/mm³, slightly low because of unclear reason.  Doing well.  Endorses no symptoms of concern.  No weakness, fatigue, headaches, vision impairment, shortness of breath, abdominal pain, nausea, vomiting, etc.    4/18/2018: Patient presents today for follow up on erythrocytosis. His labs today continue with no abnormality. Hgb 14.5, down from 21.9. Numbers resolved since he no longer is smoking. This is my first visit with this nice young gentleman. He is very concerned about his health and has taken initiative to be healthy. He has no symptoms, no itching, no  headache or blurred vision. However, his blood pressure is elevated today but has been out of his medication for 2 days. He is followed by Nataliia Patel and has appointment in the next several weeks.            Histories   Past Medical History:    No active or resolved past medical history items have been selected or recorded.   Family History:    Kidney disease  Sister  Hypertension.  Father  , His sister has kidney problems.  Grandmother had some kind of cancer.   Procedure history:    none.   Social History        Social & Psychosocial Habits    Alcohol  11/15/2017  Use: Current    Type: Liquor    Frequency: 1-2 times per year    Employment/School  11/15/2017  Status: Employed    Description: [employer]    Highest education: High school    Tobacco  01/02/2018  Use: Current every day smoker    Type: Cigarettes    Tobacco use per day: 5    Started [age] age: 17.0 Years    Comment: quit 2 weeks ago. supplementing with vapor 2xdaily - 01/02/2018 15:39 - Len MERINO, Ena FIELD  .     Single.  Has 2 children, ages 16 and 4.  Has been smoking about a pack of cigarettes daily for 17 years.  No alcohol or illicit drug abuse..        Review of Systems   12 point review of systems done in full with pertinent positives as described in interval history. Remainder of review of systems unremarkable.      Health Status   Allergies:    Allergic Reactions (Selected)  No Known Medication Allergies,    Allergies (1) Active Reaction  No Known Medication Allergies None Documented     Current medications:  (Selected)   Documented Medications  Documented  Fish Oil 1200 mg oral capsule: 1,200 mg, 1 cap(s), Oral, Daily, 0 Refill(s)  amlodipine 10 mg oral tablet: 10 mg, 1 tab(s), Oral, Daily, 30 tab(s), 0 Refill(s)  aspirin 325 mg oral tablet: 325 mg, 1 tab(s), Oral, Daily, 0 Refill(s)   Problem list:    All Problems  Erythrocytosis / 547471 / Confirmed  Hypertension / 07491927 / Confirmed  Obesity / 0147371015 / Confirmed  Tobacco user /  [id number] / Probable  Inactive: Polycythemia vera / [id number],    Active Problems (4)  Erythrocytosis   Hypertension   Obesity   Tobacco user         Physical Examination   Vital Signs   4/19/2018 9:05 CDT       Temperature Oral          37 DegC                             Temperature Oral (calculated)             98.60 DegF                             Peripheral Pulse Rate     91 bpm                             Respiratory Rate          18 br/min                             Systolic Blood Pressure   158 mmHg  HI                             Diastolic Blood Pressure  108 mmHg  HI     General:  Alert and oriented, No acute distress.    Eye:  Pupils are equal, round and reactive to light, Extraocular movements are intact, Normal conjunctiva, No conjunctival injection..    HENT:  Normocephalic, Oral mucosa is moist, No pharyngeal erythema, No sinus tenderness.    Neck:  Supple, Non-tender, No jugular venous distention, No lymphadenopathy, No thyromegaly.    Respiratory:  Lungs are clear to auscultation, Respirations are non-labored, Breath sounds are equal, Symmetrical chest wall expansion, No chest wall tenderness.    Cardiovascular:  Normal rate, Regular rhythm, No murmur, No gallop.    Gastrointestinal:  Soft, Non-tender, Non-distended, No organomegaly, Liver and spleen not palpable..    Genitourinary:  No costovertebral angle tenderness, No inguinal tenderness.    Lymphatics:  No lymphadenopathy neck, axilla, groin.    Neurologic:  Alert, Oriented, Normal sensory, Normal motor function, No focal deficits.    Cognition and Speech:  Oriented, Speech clear and coherent, Functional cognition intact.    Psychiatric:  Cooperative, Appropriate mood & affect, Normal judgment, Non-suicidal.       Review / Management   Results review   Laboratory Results   Today's Lab Results : PowerNote Discrete Results   4/19/2018 8:42 CDT       WBC                       4.3 x10(3)/mcL  LOW                             RBC                        4.81 x10(6)/mcL                             Hgb                       14.5 gm/dL                             Hct                       41.8 %                             Platelet                  197 x10(3)/mcL                             MCV                       86.9 fL                             MCH                       30.1 pg                             MCHC                      34.7 gm/dL                             RDW                       13.0 %                             MPV                       10.0 fL                             Abs Neut                  2.35 x10(3)/mcL                             Neutro Auto               55 x10(3)/mcL  NA                             Lymph Auto                30 %                             Mono Auto                 11 %                             Eos Auto                  2  NA                             Abs Eos                   0.09  NA                             Basophil Auto             1  NA                             Abs Neutro                2.35 x10(3)/mcL  NA                             Abs Lymph                 1.31 x10(3)/mcL  NA                             Abs Mono                  0.49 x10(3)/mcL  NA                             Abs Baso                  0.05 x10(3)/mcL  NA                             IG%                       0 %  NA                             IG#                       0.0100  NA                             Sodium Lvl                142 mmol/L                             Potassium Lvl             3.6 mmol/L                             Chloride                  110 mmol/L  HI                             CO2                       26 mmol/L                             Calcium Lvl               8.5 mg/dL                             Glucose Lvl               112 mg/dL  HI                             BUN                       8 mg/dL                             Creatinine                1.00 mg/dL                             eGFR-AA                    >105 mL/min                             eGFR-JEREMIAH                  90 mL/min                             Bili Total                0.4 mg/dL                             Bili Direct               0.1 mg/dL                             Bili Indirect             0.3 mg/dL                             AST                       50 unit/L  HI                             ALT                       50 unit/L                             Alk Phos                  116 unit/L                             Total Protein             7.7 gm/dL                             Albumin Lvl               3.6 gm/dL                             Globulin                  4.10 gm/mL  HI                             A/G Ratio                 1 ratio           Impression and Plan      1. Secondary erythrocytosis, severe at diagnosis with hgb 21.9 (September 2017)     Prior 17-pack-year smoker but with no history of cardiopulmonary problems or dyspnea.     Erythropoietin level is not suppressed; this is a point against polycythemia vera.     Bone marrow examination does not suggest polycythemia vera.     Calretinin mutation negative in bone marrow.    JANESSA-2 mutation negative (both JANESSA-2 V617F, as well as exons 12-15 are negative).    In light of above, secondary erythrocytosis, although etiology remains unknown.  No evidence of polycythemia vera.  He has continued to abstain from smoking and concentrate on his health     2. Hypertension, recently uncontrolled. Takes amlodipine, but has been out for 2 days. He has follow up with Nataliia Patel to recheck.     Plan:  He has secondary erythrocytosis of unclear etiology.  No evidence of polycythemia vera.  At this time, he has been phlebotomized down to hemoglobin of 13.3.  No further therapeutic phlebotomy is indicated.  He has no symptoms of hyperviscosity state.  We will follow his CBC periodically.  For secondary erythrocytosis, he does not need to be put on a structured  therapeutic phlebotomy program.     Hemoglobin is stable and at a desirable level, with no symptoms of hyperviscosity state whatsoever.

## 2022-04-28 NOTE — PROGRESS NOTES
Patient:   Sean Dunham            MRN: [id number]            FIN: [id number]               Age:   37 years     Sex:  Male     :  1980   Associated Diagnoses:   None   Author:   Jose Santiago MD      Visit Information   Problem list  Secondary erythrocytosis      HPI/Clinical History:  Past medical history:  Hypertension.  Tobacco abuse.    History of present illness:  The patient is a pleasant 36-year-old -American man.  He was apparently tased in the month of September, and went to [facility] last month complaining of pain in the left upper abdominal quadrant.  They evaluated him from cardiac standpoint, and found an abnormal EKG, probably due to LVH from hypertension.  Incidentally, hemoglobin was noted to be markedly elevated, with mild thrombus cytopenia.  He also had minimally elevated troponin levels, without atypical rise and fall pattern of acute coronary syndrome.  He was evaluated by cardiology.    His admission hemoglobin on 10/23/2017 was 24 with hematocrit of 67.  After 2 phlebotomy treatments, each 500 cc, it dropped to 22.2 g percent.  Had mild thrombus cytopenia with platelet count of 99,000.  MCV was elevated to 100.7.  In CMP, alk phos was elevated to 129, and total bilirubin 5.7.    Says that for last 6 months or so, he noticed that his eyes were bloodshot.  After 2 therapeutic phlebotomy treatments, the redness in the eyes has subsided.  Denies history of headaches, stuffiness in the head, tinnitus, nosebleeds, echogenic pruritus, anorexia, weight loss, myalgias, blood clots, or bleeding.  No history of cardiopulmonary problems or chronic dyspnea.    Erythropoietin level 6.5 on 11/15/2017, not suppressed phthisis normal range 2.6-18.5).  Of no splenomegaly noted on abdominal ultrasound.           Interval History     2017:  Presents for follow-up visit.  Says that so far, has had 3 phlebotomy treatments done.  Had to take naps after phlebotomy  treatments, but no weakness fatigue or dizziness. His hemoglobin was 21.9 on November 15; down to 21.1 on November 27.  Hematocrit was 60.1 and 58.2, respectively. LDH and uric acid levels within normal limits. Erythropoietin level 6.5 on 11/15/2017, not suppressed phthisis normal range 2.6-18.5).  Of no splenomegaly noted on abdominal ultrasound.      01/15/2018:      Presents for follow-up visit. Bone marrow aspiration and core biopsy on 12/14/2017 showed hypercellular bone marrow with mild increase in erythroid precursors, pending additional stains.  No evidence of monoclonality, acute leukemia, plasma cell dyscrasia, or lymphoproliferative disorder.  CALR mutation not detected.  Cytogenetics normal.  Reticulin stain showed no increase in reticulin fibers.  Decreased iron stores.  On January 8, hemoglobin 13.3, down from 15.1 on 01/02/2018, down from 17.1 on 12/18/2017, down from 20 on 12/04/2017.  He is undergoing periodic phlebotomy treatments, 500 cc each.  We discussed all results in detail.  He feels well, and denies any symptoms of concern like weakness, fatigue, lethargy, dizziness, epistaxis, shortness of breath, abdominal pains, nausea, vomiting, etc.  He is happy that he has stopped smoking altogether.           Histories   Past Medical History:    No active or resolved past medical history items have been selected or recorded.   Family History:    Kidney disease  Sister  Hypertension.  Father  , His sister has kidney problems.  Grandmother had some kind of cancer.   Procedure history:    none.   Social History        Social & Psychosocial Habits    Alcohol  11/15/2017  Use: Current    Type: Liquor    Frequency: 1-2 times per year    Employment/School  11/15/2017  Status: Employed    Description: [employer]    Highest education: High school    Tobacco  01/02/2018  Use: Current every day smoker    Type: Cigarettes    Tobacco use per day: 5    Started at age: 17.0 Years    Comment: quit 2 weeks ago.  supplementing with vapor 2xdaily - 01/02/2018 15:39 - Len MERINO, Ena Hinton     Single.  Has 2 children, ages 16 and 4.  Has been smoking about a pack of cigarettes daily for 17 years.  No alcohol or illicit drug abuse..        Review of Systems   12 point review of systems done in full with pertinent positives as described in interval history. Remainder of review of systems unremarkable.      Health Status   Allergies:    Allergic Reactions (Selected)  No Known Medication Allergies,    Allergies (1) Active Reaction  No Known Medication Allergies None Documented     Current medications:  (Selected)   Documented Medications  Documented  Fish Oil 1200 mg oral capsule: 1,200 mg = 1 cap(s), Oral, Daily, 0 Refill(s)  amlodipine 10 mg oral tablet: 10 mg = 1 tab(s), Oral, Daily, # 30 tab(s), 0 Refill(s)  aspirin 325 mg oral tablet: 325 mg = 1 tab(s), Oral, Daily, 0 Refill(s)   Problem list:    All Problems  Hypertension / SNOMED CT 34333082 / Confirmed  Polycythemia vera / SNOMED CT 619851882 / Confirmed  Tobacco user / SNOMED CT 710720710 / Probable,    Active Problems (3)  Hypertension   Polycythemia vera   Tobacco user         Physical Examination   VS/Measurements   General:  Alert and oriented, No acute distress.    Eye:  Pupils are equal, round and reactive to light, Extraocular movements are intact, Normal conjunctiva, No conjunctival injection..    HENT:  Normocephalic, Oral mucosa is moist, No pharyngeal erythema, No sinus tenderness.    Neck:  Supple, Non-tender, No jugular venous distention, No lymphadenopathy, No thyromegaly.    Respiratory:  Lungs are clear to auscultation, Respirations are non-labored, Breath sounds are equal, Symmetrical chest wall expansion, No chest wall tenderness.    Cardiovascular:  Normal rate, Regular rhythm, No murmur, No gallop.    Gastrointestinal:  Soft, Non-tender, Non-distended, No organomegaly, Liver and spleen not palpable..    Genitourinary:  No costovertebral angle  tenderness, No inguinal tenderness.    Lymphatics:  No lymphadenopathy neck, axilla, groin.    Neurologic:  Alert, Oriented, Normal sensory, Normal motor function, No focal deficits.    Cognition and Speech:  Oriented, Speech clear and coherent, Functional cognition intact.    Psychiatric:  Cooperative, Appropriate mood & affect, Normal judgment, Non-suicidal.       Review / Management   Results review      Impression and Plan      Severe erythrocytosis, hemoglobin 24, diagnosed in this generally healthy middle-aged gentleman, about 17-pack-year smoker but with no history of cardiopulmonary problems or dyspnea.  Erythropoietin level is not suppressed; this is a point against polycythemia vera.  Bone marrow examination does not suggest polycythemia vera.  Calretinin mutation negative in bone marrow.  JANESSA-2 mutation negative (both JANESSA-2 V617F, as well as exons 12-15 are negative).    In light of above, secondary erythrocytosis, although etiology remains unknown.      Plan:  He has secondary erythrocytosis of unclear etiology.  No evidence of polycythemia vera.  At this time, he has been phlebotomized down to hemoglobin of 13.3.  No further therapeutic phlebotomy is indicated.  He has no symptoms of hyperviscosity state.  We will follow his CBC periodically.  For secondary erythrocytosis, he does not need to be put on a structured therapeutic phlebotomy program.    He is proud that he has quit smoking altogether on my advice.    Follow-up visit in 1 month, with CBC.  If hemoglobin is adequate at that time, then follow-up intervals can be spaced out.    He fully understands and agrees with this plan, and was appreciative.

## 2022-04-28 NOTE — PROGRESS NOTES
Patient:   Sean Dunham             MRN: [id number]            FIN: [id number]               Age:   36 years     Sex:  Male     :  1980   Associated Diagnoses:   None   Author:   Jose Santiago MD      Visit Information   Problem list    Current Treatment:        Treatment History:       HPI/Clinical History:  Possible history:  Hypertension.  Tobacco abuse.    History of present illness:  The patient is a pleasant 36-year-old -American man.  He was apparently tased in the month of September, and went to [facility] last month complaining of pain in the left upper abdominal quadrant.  They evaluated him from cardiac standpoint, and found an abnormal EKG, probably due to LVH from hypertension.  Incidentally, hemoglobin was noted to be markedly elevated, with mild thrombus cytopenia.  He also had minimally elevated troponin levels, without atypical rise and fall pattern of acute coronary syndrome.  He was evaluated by cardiology.    His admission hemoglobin on 10/23/2017 was 24 with hematocrit of 67.  After 2 phlebotomy treatments, each 500 cc, it dropped to 22.2 g percent.  Had mild thrombus cytopenia with platelet count of 99,000.  MCV was elevated to 100.7.  In CMP, alk phos was elevated to 129, and total bilirubin 5.7.    Says that for last 6 months or so, he noticed that his eyes were bloodshot.  After 2 therapeutic phlebotomy treatments, the redness in the eyes has subsided.  Denies history of headaches, stuffiness in the head, tinnitus, nosebleeds, echogenic pruritus, anorexia, weight loss, myalgias, blood clots, or bleeding.  No history of cardiopulmonary problems or chronic dyspnea.         Interval History          Histories   Past Medical History:    No active or resolved past medical history items have been selected or recorded.   Family History:    Hypertension.  Father  , His sister has kidney problems.  Grandmother had some kind of cancer.   Procedure history:    none.    Social History        Social & Psychosocial Habits    Alcohol  11/15/2017  Use: Current    Type: Liquor    Frequency: 1-2 times per year    Employment/School  11/15/2017  Status: Employed    Description: [employer]    Highest education: High school    Tobacco  11/15/2017  Use: Current every day smoker    Type: Cigarettes    Tobacco use per day: 5    Started at age: 17.0 Years  .     Single.  Has 2 children, ages 16 and 4.  Has been smoking about a pack of cigarettes daily for 17 years.  No alcohol or illicit drug abuse..        Review of Systems   12 point review of systems done in full with pertinent positives as described in interval history. Remainder of review of systems unremarkable.      Health Status   Allergies:    Allergic Reactions (Selected)  No Known Medication Allergies,    Allergies (1) Active Reaction  No Known Medication Allergies None Documented     Current medications:  (Selected)   Documented Medications  Documented  Fish Oil 1200 mg oral capsule: 1,200 mg = 1 cap(s), Oral, Daily, 0 Refill(s)  amlodipine 10 mg oral tablet: 10 mg = 1 tab(s), Oral, Daily, # 30 tab(s), 0 Refill(s)  aspirin 325 mg oral tablet: 325 mg = 1 tab(s), Oral, Daily, 0 Refill(s)   Problem list:    All Problems  Tobacco user / SNOMED CT [id number] / Probable,    Active Problems (1)  Tobacco user         Physical Examination   Vital Signs   11/15/2017 14:23 CST     Temperature Oral          37 DegC                             Peripheral Pulse Rate     85 bpm                             Respiratory Rate          20 br/min                             SpO2                      99 %                             Systolic Blood Pressure   169 mmHg  HI                             Diastolic Blood Pressure  100 mmHg  HI     Measurements from flowsheet : Measurements   11/15/2017 14:23 CST     Weight Dosing             67.4 kg                             Weight Measured           67.4 kg                             Weight Measured and  Calculated in Lbs     148.59 lb                             Height/Length Dosing      165 cm                             Height/Length Measured    165 cm                             BSA Measured              1.76 m2                             Body Mass Index Measured  24.76 kg/m2     General:  Alert and oriented, No acute distress.    Eye:  Pupils are equal, round and reactive to light, Extraocular movements are intact, Normal conjunctiva, No conjunctival injection..    HENT:  Normocephalic, Oral mucosa is moist, No pharyngeal erythema, No sinus tenderness.    Neck:  Supple, Non-tender, No jugular venous distention, No lymphadenopathy, No thyromegaly.    Respiratory:  Lungs are clear to auscultation, Respirations are non-labored, Breath sounds are equal, Symmetrical chest wall expansion, No chest wall tenderness.    Cardiovascular:  Normal rate, Regular rhythm, No murmur, No gallop.    Gastrointestinal:  Soft, Non-tender, Non-distended, No organomegaly, Liver and spleen not palpable..    Genitourinary:  No costovertebral angle tenderness, No inguinal tenderness.    Lymphatics:  No lymphadenopathy neck, axilla, groin.    Neurologic:  Alert, Oriented, Normal sensory, Normal motor function, No focal deficits.    Cognition and Speech:  Oriented, Speech clear and coherent, Functional cognition intact.    Psychiatric:  Cooperative, Appropriate mood & affect, Normal judgment, Non-suicidal.       Review / Management   Results review      Impression and Plan      Severe erythrocytosis, hemoglobin 24, diagnosed in this generally healthy middle-aged gentleman, about 17-pack-year smoker but with no history of cardiopulmonary problems or dyspnea.  Could be polycythemia vera but we need to investigate.      Plan:  Check CBC, CMP, erythropoietin level, JANESSA-2 mutation, LDH, uric acid level.  Check pulse oximetry at rest and after some physical activity.  Will arrange for bone marrow examination.  Will evaluate spleen size with  ultrasound.    He is already taking the aspirin 81 mg daily.  This is important to prevent thrombo-embolic events.  Advised to continue.    Continue weekly therapeutic phlebotomy treatments, 500 cc each, pending test results.    Advised absolute abstinence from smoking.    Follow-up visit in 2 weeks, to discuss test results.  Explained the nature of polycythemia in detail to him and his female friend.  Explained the potential complications of thrombosis as well as bleeding, and if polycythemia vera, late possibility of evolving into myelofibrosis and/or acute myeloid leukemia.

## 2022-05-13 DIAGNOSIS — D75.1 SECONDARY POLYCYTHEMIA: Primary | ICD-10-CM

## 2022-05-20 ENCOUNTER — TELEPHONE (OUTPATIENT)
Dept: HEMATOLOGY/ONCOLOGY | Facility: CLINIC | Age: 42
End: 2022-05-20
Payer: MEDICAID

## 2022-05-20 NOTE — TELEPHONE ENCOUNTER
Mr Dunham is currently scheduled to see me on 5/26/22 however I am not in the office that day. Please move to another provider's schedule for that day or ok to reschedule to a different day if needed.       Thanks,  Sharda

## 2022-05-20 NOTE — HISTORICAL [facility]ER
This is a historical note converted from [employer]. Formatting and pictures may have been removed.  Please reference [employer] for original formatting and attached multimedia. Chief Complaint  secondary erythrocytosis  History of Present Illness  Problem list:  Severe erythrocytosis  ?   Current?Treatment:  ASA 81mg po daily.  Therapeutic phlebotomy to keep Hct <45%  ?   History of present illness:  The patient is a pleasant 36-year-old -American man. For a full, detailed history, please see the note dated 8/9/2021.  ?   Interval History  2/21/2022: Patient presents today alone?for scheduled follow up for Erythrocytosis. He is currently taking Aspirin 81mg daily and reports compliance. He denies any itching, HA, weakness, or fatigue. Lab work reviewed with patient, stable. H&H 13.8, 44.1. Therefore, no need for phlebotomy due to Hct <45%. Patient amenable. Discussed monthly lab work check with patient. No further needs or questions voiced by patient.  Review of Systems  A complete 12-point ROS was performed in full with pertinent positives as described in interval history. Remainder of ROS done in full and are negative.  ?  Physical Exam  Vitals & Measurements  T:?37.1? ?C (Oral)? HR:?85(Peripheral)? RR:?18? BP:?122/81? SpO2:?100%?  HT:?165.10?cm? WT:?73.750?kg? BMI:?27.06?  Physical Exam:  General: Alert and oriented, No acute distress.?  Appearance: Well-groomed, wearing mask  HEENT: Normocephalic, Oral mucosa is moist. Pupils are equal, round and reactive to light, Extraocular movements are intact, Normal conjunctiva.?  Neck: Supple, Non-tender, No lymphadenopathy, No thyromegaly.?  Respiratory: Lungs are clear to auscultation, Respirations are non-labored, Breath sounds are equal, Symmetrical chest wall expansion.?  Cardiovascular: Normal rate, Regular rhythm, No edema.?  Breast: Breast exam not performed on todays visit.?  Gastrointestinal: Rounded, Soft, Non-tender, Non-distended, Normal bowel  sounds.?  Musculoskeletal: Normal strength. Ambulates without assistance  Integumentary: Warm, dry, intact.?  Neurologic: Alert, Oriented, No focal deficits, Cranial Nerves II-XII are grossly intact.?  Cognition and Speech: Oriented, Speech clear and coherent.?  Psychiatric: Cooperative, Appropriate mood & affect.?  ECOG Performance Scale:?0 -?No restrictions  ?  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 Erythropoietin level is not suppressed; this is a point against polycythemia vera. ?Bone marrow examination does not suggest polycythemia vera. ?Calretinin mutation negative in bone marrow. JANESSA-2 mutation negative (both JANESSA-2 V617F, as well as exons 12-15 are negative)  -->  Polycythemia vera ruled out?  (erythropoietin level not suppressed; JANESSA?mutation negative)  Patient has some kind of secondary erythrocytosis?  -Phlebotomized: 11/20/2017; 12/11/2017; 01/02/2018;?03/12/2019 (H/H 18.6/51.4); 07/15/2019 (H/H 18.0/52.2); 10/14/2019 (H/H 18.1/53.6)  -H/H: 16.2/48.8 (12/09/2019); 16.2/48.8 (11/11/2019; 16.0/48.1 (10/21/2019); 18.1/53.6 (10/08/2019), etc.  -After last therapeutic phlebotomy on 10/14/2019 (H/H 18.1/23.6), his H/H has remained stable, 16.6/49.9?as of 02/10/2020  -Therapeutic phlebotomy 605 cc on 04/21/2020 (H/H 17.7/52.2)  -07/06/2020:?H/H 17.4/51.6  -Therapeutic phlebotomy 07/09/2020 (H/H 17.4/51.6), 07/20/2020 (H/H 15.3/45.3), 07/27/2020 (H/H 14.8/45.2)  -08/03/2020: H/H 14.0/42.2 (phlebotomy held)  -08/11/2020:?H/H 13.9/41.4?(phlebotomy held)  -09/14/2020: CBC reviewed. ?H/H 14.6/44.1.  ?   Plan:  -09/15/2020: No indication of phlebotomy at this time  -Recheck CBC in 1 month?to assess the need?of therapeutic phlebotomy, then follow-up visit  ?   -No evidence of polycythemia vera  -Has secondary  polycythemia  -No symptoms of hyperviscosity state  -However, has behaved more or less like polycythemia vera?(persistently elevated H/H), requiring?periodic therapeutic phlebotomy treatments  ?   -Therapeutic phlebotomy as needed  -Target hematocrit <45  -Hold phlebotomy if hematocrit <45  -Continue baby aspirin 81 mg p.o. daily  ?   -He has quit smoking  ?   Discussion:  He has secondary erythrocytosis of unclear etiology. ?  No evidence of polycythemia vera?(normal erythropoietin level?and negative JANESSA-2 mutation rule out polycythemia vera).  However,?in terms of?persistence of?elevated H/H,?has behaved more or less like polycythemia vera.  ?   Had no symptoms of hyperviscosity state at any time?despite severe elevation of hemoglobin.  ?   Regardless,?to be on the safer side,?will continue therapeutic phlebotomy as needed.  ?   Continue ASA 81mg PO daily.  Hct 44.1; no phlebotomy today  Therapeutic phlebotomy to keep Hct <45%.  RTC Q month with CBC, infusion nurses to review only give phlebotomy if HCT >45%  Follow up in?3 months (F2F) with CBC, CMP  Ordered:  ?   Problem List/Past Medical History  Ongoing  Abnormal TSH  Bilateral thumb pain  WALTER (generalized anxiety disorder)  HTN (hypertension)  Obesity  Rheumatoid factor positive  Secondary erythrocytosis  Historical  Polycythemia vera  Tobacco user  Procedure/Surgical History  Bone marrow; biopsy, needle or trocar (12/14/2017)  Extraction of Iliac Bone Marrow, Percutaneous Approach, Diagnostic (12/14/2017)  none   Medications  aspirin 81 mg oral Delayed Release (EC) tablet, 81 mg= 1 tab(s), Oral, Daily, 5 refills  diclofenac 1% topical gel, 2 gm, TOP, QID, PRN, 1 refills  diclofenac sodium 50 mg oral delayed release tablet, 50 mg= 1 tab(s), Oral, BID, PRN, 3 refills  Fish Oil 1200 mg oral capsule, 1200 mg= 1 cap(s), Oral, BID, 5 refills  hydrALAZINE 25 mg oral tablet, 25 mg= 1 tab(s), Oral, Daily, 5 refills  hydrOXYzine pamoate 25 mg oral capsule, 25 mg= 1  cap(s), Oral, TID, 1 refills  losartan 100 mg oral tablet, 100 mg= 1 tab(s), Oral, Daily, 5 refills  NIFEdipine 60 mg oral tablet, extended release, 60 mg= 1 tab(s), Oral, Daily, 5 refills  Protonix 20 mg ORAL enteric coated tablet, 20 mg= 1 tab(s), Oral, Daily, 5 refills  thumb spica splint L, See Instructions,? ?Not taking  thumb spica splint right, See Instructions,? ?Not taking  Allergies  No Known Medication Allergies  Social History  Abuse/Neglect  No, 02/21/2022  Employment/School  Employed, Work/School description: [employer]. Highest education level: High school., 11/15/2017  Exercise  Exercise duration: 0., 09/14/2020  Financial/Legal Situation  None, 09/14/2020  Home/Environment  Lives with Alone., 12/02/2019    Never in , 09/14/2020  Nutrition/Health  Regular, Good, 12/02/2019  Sexual  Sexually active: Yes. Sexual orientation: Straight or heterosexual. Gender Identity Identifies as male., 03/11/2019  Spiritual/Cultural  None, No, 12/02/2019  Tobacco  Never (less than 100 in lifetime), N/A, 02/21/2022  Family History  Hypertension.: Father.  Kidney disease: Sister.  Immunizations  Vaccine Date Status   COVID-19 mRNA, LNP-S, PF - Moderna 06/03/2021 Recorded   COVID-19 mRNA, LNP-S, PF - Moderna 05/03/2021 Recorded   influenza virus vaccine, inactivated 12/07/2020 Given   tetanus/diphtheria/pertussis, acel(Tdap) 12/02/2019 Given   influenza virus vaccine, inactivated 12/02/2019 Given   Health Maintenance  Health Maintenance  ???Pending?(in the next year)  ??? ??OverDue  ??? ? ? ?Influenza Vaccine due??10/01/21??and every 1??day(s)  ??? ??Due?  ??? ? ? ?Alcohol Misuse Screening due??01/02/22??and every 1??year(s)  ??? ??Due In Future?  ??? ? ? ?Hypertension Management-Education not due until??03/01/22??and every 1??year(s)  ??? ? ? ?ADL Screening not due until??08/09/22??and every 1??year(s)  ??? ? ? ?Obesity Screening not due until??01/01/23??and every 1??year(s)  ??? ? ? ?Diabetes Screening not  due until??02/07/23??and every 1??year(s)  ??? ? ? ?Hypertension Management-BMP not due until??02/07/23??and every 1??year(s)  ???Satisfied?(in the past 1 year)  ??? ??Satisfied?  ??? ? ? ?ADL Screening on??08/09/21.??Satisfied by Maddy Alaniz LPN  ??? ? ? ?Alcohol Misuse Screening on??03/01/21.??Satisfied by Ann Armenta LPN  ??? ? ? ?Blood Pressure Screening on??02/21/22.??Satisfied by BRENDA Odonnell Ericka  ??? ? ? ?Body Mass Index Check on??02/21/22.??Satisfied by BRENDA Odonnell Ericka  ??? ? ? ?Depression Screening on??02/21/22.??Satisfied by BRENDA Odonnell Ericka  ??? ? ? ?Diabetes Screening on??02/07/22.??Satisfied by Shailesh De Los Santos.  ??? ? ? ?Hypertension Management-Blood Pressure on??02/21/22.??Satisfied by BRENDA Odonnell Ericka  ??? ? ? ?Influenza Vaccine on??02/21/22.??Satisfied by BRENDA Odonnell Ericka  ??? ? ? ?Lipid Screening on??03/01/21.??Satisfied by Sherley Mead  ??? ? ? ?Obesity Screening on??02/21/22.??Satisfied by BRENDA Odonnell Ericka  ?  Lab Results  Test Name Test Result Date/Time   WBC 4.2 x10(3)/mcL (Low) 02/21/2022 08:11 CST   RBC 5.32 x10(6)/mcL 02/21/2022 08:11 CST   Hgb 13.8 gm/dL 02/21/2022 08:11 CST   Hct 44.1 % 02/21/2022 08:11 CST   Platelet 254 x10(3)/mcL 02/21/2022 08:11 CST   MCV 82.9 fL 02/21/2022 08:11 CST   MCH 25.9 pg (Low) 02/21/2022 08:11 CST   MCHC 31.3 gm/dL 02/21/2022 08:11 CST   RDW 15.3 % (High) 02/21/2022 08:11 CST   MPV 9.6 fL 02/21/2022 08:11 CST   Abs Neut 2.68 x10(3)/mcL 02/21/2022 08:11 CST   Neutro Auto 64 % 02/21/2022 08:11 CST   Lymph Auto 25 % 02/21/2022 08:11 CST   Mono Auto 9 % 02/21/2022 08:11 CST   Eos Auto 1 % 02/21/2022 08:11 CST   Abs Eos 0.1 x10(3)/mcL 02/21/2022 08:11 CST   Basophil Auto 1 % 02/21/2022 08:11 CST   Abs Neutro 2.68 x10(3)/mcL 02/21/2022 08:11 CST   Abs Lymph 1.0 x10(3)/mcL 02/21/2022 08:11 CST   Abs Mono 0.4 x10(3)/mcL 02/21/2022 08:11 CST   Abs Baso 0.0 x10(3)/mcL 02/21/2022 08:11 CST   NRBC% 0.0 % 02/21/2022 08:11 CST   IG% 0 % 02/21/2022  08:11 CST   IG# 0.010 02/21/2022 08:11 CST

## 2022-05-23 ENCOUNTER — OFFICE VISIT (OUTPATIENT)
Dept: FAMILY MEDICINE | Facility: CLINIC | Age: 42
End: 2022-05-23
Payer: MEDICAID

## 2022-05-23 VITALS
HEART RATE: 84 BPM | BODY MASS INDEX: 26.89 KG/M2 | TEMPERATURE: 98 F | RESPIRATION RATE: 18 BRPM | HEIGHT: 65 IN | OXYGEN SATURATION: 98 % | SYSTOLIC BLOOD PRESSURE: 100 MMHG | WEIGHT: 161.38 LBS | DIASTOLIC BLOOD PRESSURE: 64 MMHG

## 2022-05-23 DIAGNOSIS — D75.1 SECONDARY POLYCYTHEMIA: ICD-10-CM

## 2022-05-23 DIAGNOSIS — M79.646 THUMB PAIN, UNSPECIFIED LATERALITY: ICD-10-CM

## 2022-05-23 DIAGNOSIS — R76.8 ELEVATED RHEUMATOID FACTOR: ICD-10-CM

## 2022-05-23 DIAGNOSIS — K21.9 GASTROESOPHAGEAL REFLUX DISEASE WITHOUT ESOPHAGITIS: ICD-10-CM

## 2022-05-23 DIAGNOSIS — F41.1 GENERALIZED ANXIETY DISORDER: Primary | ICD-10-CM

## 2022-05-23 DIAGNOSIS — I10 PRIMARY HYPERTENSION: ICD-10-CM

## 2022-05-23 DIAGNOSIS — Z79.1 NSAID LONG-TERM USE: ICD-10-CM

## 2022-05-23 DIAGNOSIS — Z86.39 HISTORY OF ENDOGENOUS HYPERTRIGLYCERIDEMIA: ICD-10-CM

## 2022-05-23 PROBLEM — E66.9 OBESITY: Status: ACTIVE | Noted: 2022-05-23

## 2022-05-23 PROBLEM — R94.6 THYROID FUNCTION TEST ABNORMAL: Status: RESOLVED | Noted: 2022-05-23 | Resolved: 2022-05-23

## 2022-05-23 PROBLEM — R94.6 THYROID FUNCTION TEST ABNORMAL: Status: ACTIVE | Noted: 2022-05-23

## 2022-05-23 PROCEDURE — 4010F ACE/ARB THERAPY RXD/TAKEN: CPT | Mod: CPTII,,, | Performed by: FAMILY MEDICINE

## 2022-05-23 PROCEDURE — 99214 OFFICE O/P EST MOD 30 MIN: CPT | Mod: S$PBB,,, | Performed by: FAMILY MEDICINE

## 2022-05-23 PROCEDURE — 3074F PR MOST RECENT SYSTOLIC BLOOD PRESSURE < 130 MM HG: ICD-10-PCS | Mod: CPTII,,, | Performed by: FAMILY MEDICINE

## 2022-05-23 PROCEDURE — 3074F SYST BP LT 130 MM HG: CPT | Mod: CPTII,,, | Performed by: FAMILY MEDICINE

## 2022-05-23 PROCEDURE — 4010F PR ACE/ARB THEARPY RXD/TAKEN: ICD-10-PCS | Mod: CPTII,,, | Performed by: FAMILY MEDICINE

## 2022-05-23 PROCEDURE — 99214 OFFICE O/P EST MOD 30 MIN: CPT | Mod: PBBFAC | Performed by: FAMILY MEDICINE

## 2022-05-23 PROCEDURE — 3078F DIAST BP <80 MM HG: CPT | Mod: CPTII,,, | Performed by: FAMILY MEDICINE

## 2022-05-23 PROCEDURE — 3008F PR BODY MASS INDEX (BMI) DOCUMENTED: ICD-10-PCS | Mod: CPTII,,, | Performed by: FAMILY MEDICINE

## 2022-05-23 PROCEDURE — 3008F BODY MASS INDEX DOCD: CPT | Mod: CPTII,,, | Performed by: FAMILY MEDICINE

## 2022-05-23 PROCEDURE — 99214 PR OFFICE/OUTPT VISIT, EST, LEVL IV, 30-39 MIN: ICD-10-PCS | Mod: S$PBB,,, | Performed by: FAMILY MEDICINE

## 2022-05-23 PROCEDURE — 3078F PR MOST RECENT DIASTOLIC BLOOD PRESSURE < 80 MM HG: ICD-10-PCS | Mod: CPTII,,, | Performed by: FAMILY MEDICINE

## 2022-05-23 RX ORDER — LOSARTAN POTASSIUM 100 MG/1
100 TABLET ORAL DAILY
Qty: 30 TABLET | Refills: 11 | Status: SHIPPED | OUTPATIENT
Start: 2022-05-23 | End: 2023-04-24 | Stop reason: SDUPTHER

## 2022-05-23 RX ORDER — ASPIRIN 81 MG/1
TABLET ORAL
COMMUNITY
Start: 2022-04-28 | End: 2022-05-23 | Stop reason: SDUPTHER

## 2022-05-23 RX ORDER — LISINOPRIL AND HYDROCHLOROTHIAZIDE 20; 25 MG/1; MG/1
TABLET ORAL
COMMUNITY
Start: 2022-04-05 | End: 2022-05-23

## 2022-05-23 RX ORDER — DICLOFENAC SODIUM 10 MG/G
2 GEL TOPICAL
COMMUNITY
Start: 2021-09-21 | End: 2022-05-23 | Stop reason: SDUPTHER

## 2022-05-23 RX ORDER — HYDRALAZINE HYDROCHLORIDE 25 MG/1
25 TABLET, FILM COATED ORAL DAILY
Qty: 30 TABLET | Refills: 11 | Status: SHIPPED | OUTPATIENT
Start: 2022-05-23 | End: 2023-05-24 | Stop reason: SDUPTHER

## 2022-05-23 RX ORDER — LOSARTAN POTASSIUM 100 MG/1
TABLET ORAL
COMMUNITY
Start: 2022-02-22 | End: 2022-05-23 | Stop reason: SDUPTHER

## 2022-05-23 RX ORDER — PANTOPRAZOLE SODIUM 20 MG/1
20 TABLET, DELAYED RELEASE ORAL DAILY
Qty: 30 TABLET | Refills: 11 | Status: SHIPPED | OUTPATIENT
Start: 2022-05-23 | End: 2022-07-28

## 2022-05-23 RX ORDER — MELOXICAM 7.5 MG/1
TABLET ORAL
Qty: 60 TABLET | Refills: 5 | Status: SHIPPED | OUTPATIENT
Start: 2022-05-23 | End: 2022-07-28

## 2022-05-23 RX ORDER — HYDRALAZINE HYDROCHLORIDE 25 MG/1
TABLET, FILM COATED ORAL
COMMUNITY
Start: 2022-05-13 | End: 2022-05-23 | Stop reason: SDUPTHER

## 2022-05-23 RX ORDER — DICLOFENAC SODIUM 10 MG/G
2 GEL TOPICAL DAILY
Qty: 100 G | Refills: 11 | Status: SHIPPED | OUTPATIENT
Start: 2022-05-23 | End: 2023-05-23

## 2022-05-23 RX ORDER — NIFEDIPINE 60 MG/1
TABLET, EXTENDED RELEASE ORAL
COMMUNITY
Start: 2022-04-28 | End: 2022-05-23 | Stop reason: SDUPTHER

## 2022-05-23 RX ORDER — ASPIRIN 81 MG/1
81 TABLET ORAL DAILY
Qty: 30 TABLET | Refills: 11 | Status: SHIPPED | OUTPATIENT
Start: 2022-05-23 | End: 2023-07-24

## 2022-05-23 RX ORDER — PANTOPRAZOLE SODIUM 20 MG/1
TABLET, DELAYED RELEASE ORAL
COMMUNITY
Start: 2022-01-24 | End: 2022-05-23 | Stop reason: SDUPTHER

## 2022-05-23 RX ORDER — NIFEDIPINE 60 MG/1
60 TABLET, EXTENDED RELEASE ORAL DAILY
Qty: 30 TABLET | Refills: 11 | Status: SHIPPED | OUTPATIENT
Start: 2022-05-23 | End: 2023-05-24

## 2022-05-23 NOTE — PROGRESS NOTES
[facility]  [address] [address]  Phone: [phone]   Subjective:      Patient ID: Sean Dunham is a 41 y.o. male with a past medical history of HTN, generalized anxiety, WALTER, GERD, b/l thumb pain, positive RF and secondary erythrocytosis followed by [facility] Heme/ Onc    Chief Complaint: Follow-up    Problem List Items Addressed This Visit        Psychiatric    Generalized anxiety disorder - Primary  Has improved per patient.  Patient rarely uses hydroxyzine p.r.n..       Cardiac/Vascular    Hypertension    Overview     The patient presents with essential hypertension.  The patient is tolerating the medication well and is in excellent compliance.  The patient is experiencing no side effects.  Counseling was offered regarding low salt diets.  The patient has a reduced salt intake.  The patient denies chest pain, palpitations, shortness of breath, dyspnea on exertion, left or murmur neck pain, nausea, vomiting, diaphoresis, paroxysmal nocturnal dyspnea, and orthopnea.   Hypertension Medications             hydrALAZINE (APRESOLINE) 25 MG tablet     lisinopriL-hydrochlorothiazide (PRINZIDE,ZESTORETIC) 20-25 mg Tab     losartan (COZAAR) 100 MG tablet     NIFEdipine (PROCARDIA-XL) 60 MG (OSM) 24 hr tablet                          Relevant Medications    aspirin (ECOTRIN) 81 MG EC tablet    hydrALAZINE (APRESOLINE) 25 MG tablet    losartan (COZAAR) 100 MG tablet    NIFEdipine (PROCARDIA-XL) 60 MG (OSM) 24 hr tablet    Other Relevant Orders    Lipid Panel    Comprehensive Metabolic Panel    CBC Auto Differential    TSH    Lipid Panel       Immunology/Multi System    Elevated rheumatoid factor    Overview     Positive RF: Pt has an extensive family history of lupus- 3 sisters diagnosed with lupus. 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 Pt states when he is holding his clippers or standing up walking too much his thumb  and toe pain exacerbate. Reviewed ortho note 9/2021: Pt has joint pain to the the b/l thumbs and b/l toes. He has an appointment scheduled with rheumatology 7/28/2022 at 10 AM.           Relevant Medications    meloxicam (MOBIC) 7.5 MG tablet    diclofenac sodium (VOLTAREN) 1 % Gel       Oncology    Secondary polycythemia  Most recent H&H 07/20/2022:  13.1/44.9 with MCV 77.5 and RDW 18.3.  Followed by [facility] hematology       GI    GERD (gastroesophageal reflux disease)    Relevant Medications    Patient adheres to GERD lifestyle modifications  Controlled on pantoprazole (PROTONIX) 20 MG tablet       Orthopedic    Thumb pain    Overview     See above elevated RF section             Relevant Medications    meloxicam (MOBIC) 7.5 MG tablet    diclofenac sodium (VOLTAREN) 1 % Gel       Other    NSAID long-term use    Relevant Medications    pantoprazole (PROTONIX) 20 MG tablet    diclofenac sodium (VOLTAREN) 1 % Gel      Other Visit Diagnoses     History of endogenous hypertriglyceridemia      Most recent FLP (03/01/2021):  WNL with triglycerides normal at 90.     Relevant Orders    Lipid Panel    Lipid Panel        02/21/2022 visit  Pt is a 41 Years old Male with a past medical history of HTN, generalized anxiety, WALTER, GERD, b/l thumb pain, positive RF and secondary erythrocytosis followed by [facility] Heme/ Onc  Positive RF: Pt has an extensive family history of lupus- 3 sisters diagnosed with lupus. 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 Reviewed ortho note 9/2021: Pt has joint pain to the the b/l thumbs and b/l toes. She has an appointment scheduled with rheumatology 7/28/2022 at 10 AM.  HTN: BP this morning was 137/92. Patient states he has not taken his blood pressure medicine this morning. Pt is currently on nifedipine extended release 60 mg p.o. daily, hydralazine 25 mg p.o.  daily for blood pressure greater than 140/90, and losartan 100 mg po daily.  Patient denies chest pain, shortness of breath, dyspnea on exertion, palpitations, peripheral edema, abdominal pain, nausea, vomiting, diarrhea, constipation, fatigue, fever, chills, dysuria, hematuria, melena, or hematochezia.    The patient's Health Maintenance was reviewed and the following appears to be due:   Health Maintenance Due   Topic Date Due    Hepatitis C Screening  Never done    Lipid Panel  Never done    HIV Screening  Never done    TETANUS VACCINE  Never done    COVID-19 Vaccine (3 - Booster for Moderna series) 11/03/2021       (PHQ-2 data if performed)  Depression Patient Health Questionnaire 5/23/2022   Over the last two weeks how often have you been bothered by little interest or pleasure in doing things 1   Over the last two weeks how often have you been bothered by feeling down, depressed or hopeless 0   PHQ-2 Total Score 1     (PHQ-9 data if performed)  No flowsheet data found.  (WALTER data if performed)  No flowsheet data found.     Past Medical History:  Past Medical History:   Diagnosis Date    Acid reflux     Anxiety     Hypertension      History reviewed. No pertinent surgical history.  Review of patient's allergies indicates:  No Known Allergies  Current Outpatient Medications on File Prior to Visit   Medication Sig Dispense Refill    [DISCONTINUED] aspirin (ECOTRIN) 81 MG EC tablet       [DISCONTINUED] hydrALAZINE (APRESOLINE) 25 MG tablet       [DISCONTINUED] lisinopriL-hydrochlorothiazide (PRINZIDE,ZESTORETIC) 20-25 mg Tab       [DISCONTINUED] NIFEdipine (PROCARDIA-XL) 60 MG (OSM) 24 hr tablet       [DISCONTINUED] pantoprazole (PROTONIX) 20 MG tablet       [DISCONTINUED] diclofenac sodium (VOLTAREN) 1 % Gel Apply 2 g topically.      [DISCONTINUED] losartan (COZAAR) 100 MG tablet        No current facility-administered medications on file prior to visit.     Social History     Socioeconomic History  "   Marital status: Single   Tobacco Use    Smoking status: Former Smoker    Smokeless tobacco: Never Used   Substance and Sexual Activity    Alcohol use: Yes     Alcohol/week: 4.0 standard drinks     Types: 1 Glasses of wine, 3 Drinks containing 0.5 oz of alcohol per week     History reviewed. No pertinent family history.    Review of Systems   Constitutional: Negative for chills and fever.   HENT: Negative for congestion, hearing loss, postnasal drip, rhinorrhea and sore throat.    Eyes: Negative for visual disturbance.   Respiratory: Negative for shortness of breath and wheezing.    Cardiovascular: Negative for chest pain, palpitations and leg swelling.   Gastrointestinal: Negative for abdominal pain, constipation, diarrhea, nausea and vomiting.   Endocrine: Negative for cold intolerance, heat intolerance, polydipsia, polyphagia and polyuria.   Genitourinary: Negative for decreased urine volume, dysuria, flank pain, hematuria and urgency.   Musculoskeletal: Positive for arthralgias and joint swelling (thumbs b/l intermittently). Negative for myalgias.   Skin: Negative for rash.   Neurological: Negative for dizziness, weakness, numbness and headaches.   Hematological: Negative for adenopathy.   Psychiatric/Behavioral: Negative for behavioral problems, dysphoric mood and suicidal ideas. The patient is not nervous/anxious.    All other systems reviewed and are negative.      Objective:   /64 (BP Location: Right arm, Patient Position: Sitting)   Pulse 84   Temp 98.2 °F (36.8 °C) (Oral)   Resp 18   Ht 5' 5" (1.651 m)   Wt 73.2 kg (161 lb 6.4 oz)   SpO2 98%   BMI 26.86 kg/m²   Physical Exam  Vitals and nursing note reviewed.   Constitutional:       General: He is not in acute distress.     Appearance: Normal appearance. He is normal weight. He is not ill-appearing.   HENT:      Head: Normocephalic and atraumatic.      Right Ear: Tympanic membrane, ear canal and external ear normal.      Left Ear: " Tympanic membrane, ear canal and external ear normal.   Eyes:      Pupils: Pupils are equal, round, and reactive to light.   Cardiovascular:      Rate and Rhythm: Normal rate and regular rhythm.      Pulses: Normal pulses.      Heart sounds: Normal heart sounds. No murmur heard.    No friction rub. No gallop.   Pulmonary:      Effort: Pulmonary effort is normal.      Breath sounds: Normal breath sounds. No wheezing, rhonchi or rales.   Abdominal:      General: Abdomen is flat. Bowel sounds are normal.      Palpations: Abdomen is soft.      Tenderness: There is no abdominal tenderness.      Hernia: No hernia is present.   Musculoskeletal:         General: Swelling and tenderness (mild; Left worse than right +Finkelstein ; b/l thumb pain causing pain along the radiostyloid area; shortened b/l thumbs with lmited flexion at DIP b/l) present. Normal range of motion.      Cervical back: Normal range of motion and neck supple.   Skin:     General: Skin is warm and dry.      Capillary Refill: Capillary refill takes less than 2 seconds.   Neurological:      General: No focal deficit present.      Mental Status: He is alert and oriented to person, place, and time. Mental status is at baseline.      Cranial Nerves: No cranial nerve deficit.      Sensory: No sensory deficit.      Gait: Gait normal.   Psychiatric:         Mood and Affect: Mood normal.         Behavior: Behavior normal.         Thought Content: Thought content normal.         Judgment: Judgment normal.          No visits with results within 1 Month(s) from this visit.   Latest known visit with results is:   Historical on 04/18/2022   Component Date Value Ref Range Status    WBC 04/18/2022 7.0  4.5 - 11.0 Final    RBC 04/18/2022 5.71  4.50 - 5.90 Final    Hgb 04/18/2022 14.2  13.5 - 17.5 Final    Hct 04/18/2022 45.1  40.0 - 51.0 Final    MCV 04/18/2022 79.0  80.0 - 100.0 Final    MCH 04/18/2022 24.9  26.0 - 34.0 Final    MCHC 04/18/2022 31.5  31.0 - 37.0  Final    RDW 04/18/2022 15.2  11.5 - 14.5 Final    Platelet 04/18/2022 287  130 - 400 Final    MPV 04/18/2022 9.9  7.4 - 10.4 Final    Abs Neut 04/18/2022 4.46  2.10 - 9.20 Final    Manual Diff? 04/18/2022 No   Final    Imm. NE 2 04/18/2022 10   Final    Low Event # 04/18/2022 90   Final    MO Blasts 04/18/2022 40   Final    Platelet Clumps 04/18/2022 20   Final    FLAGS 04/18/2022 90   Final    FLAG2 04/18/2022 60   Final    FLAG3 04/18/2022 80   Final    Neut % 04/18/2022 64   Final    Lymph % 04/18/2022 25   Final    Mono % 04/18/2022 10   Final    Eos % 04/18/2022 1   Final    Basophil % 04/18/2022 1   Final    Lymph # 04/18/2022 1.7  0.6 - 4.6 Final    Neut # 04/18/2022 4.46  2.10 - 9.20 Final    Mono # 04/18/2022 0.7  0.1 - 1.3 Final    Eos # 04/18/2022 0.0  0.0 - 0.9 Final    Baso # 04/18/2022 0.1  0.0 - 0.2 Final    IG# 04/18/2022 0.030   Final    IG% 04/18/2022 0   Final    NRBC% 04/18/2022 0.0  0.0 - 0.2 Final    Sodium Level 04/18/2022 133  136 - 145 Final    Potassium Level 04/18/2022 4.6  3.5 - 5.1 Final    Chloride 04/18/2022 103  98 - 107 Final    Carbon Dioxide 04/18/2022 22  22 - 29 Final    Glucose Level 04/18/2022 94  74 - 100 Final    Blood Urea Nitrogen 04/18/2022 9.0  8.9 - 20.6 Final    Creatinine 04/18/2022 0.93  0.73 - 1.18 Final    Calcium Level Total 04/18/2022 9.7  8.7 - 10.5 Final    Albumin Level 04/18/2022 4.1  3.5 - 5.0 Final    Protein Total 04/18/2022 8.2  6.4 - 8.3 Final    Globulin 04/18/2022 4.1  2.4 - 3.5 Final    Albumin/Globulin Ratio 04/18/2022 1.0  1.1 - 2.0 Final    Alkaline Phosphatase 04/18/2022 96  40 - 150 Final    Bilirubin Total 04/18/2022 0.4  <=1.5 Final    Bilirubin Direct 04/18/2022 0.2  0.0 - 0.5 Final    Bilirubin Indirect 04/18/2022 0.20  0.00 - 0.80 Final    Aspartate Aminotransferase 04/18/2022 24  5 - 34 Final    Alanine Aminotransferase 04/18/2022 25  0 - 55 Final    Hemolysis 04/18/2022 2   Final    Icterus  04/18/2022 0   Final    Lipemia 04/18/2022 10   Final    Estimated GFR- 04/18/2022 >105  >=90 Final    Estimated GFR-Non  04/18/2022 96  >=90 Final      Assessment:     1. Generalized anxiety disorder    2. Primary hypertension    3. Secondary polycythemia    4. Elevated rheumatoid factor    5. Thumb pain, unspecified laterality    6. Gastroesophageal reflux disease without esophagitis    7. NSAID long-term use    8. History of endogenous hypertriglyceridemia      Plan:   I have continued and refilled Sean Dunham's aspirin, hydrALAZINE, losartan, NIFEdipine, pantoprazole, and diclofenac sodium topical. I am also having him start on meloxicam.  Problem List Items Addressed This Visit        Psychiatric    Generalized anxiety disorder - Primary  Continue hydralazine as needed       Cardiac/Vascular    Hypertension    Relevant Medications    aspirin (ECOTRIN) 81 MG EC tablet    hydrALAZINE (APRESOLINE) 25 MG tablet-patient educated about side effects of hydralazine which can cause lupus-like syndrome; therefore, if arthritic pain in hands and has worsened with use of hydralazine p.r.n., patient is to stop hydralazine.  Patient expressed her understanding and agreement with the above.    losartan (COZAAR) 100 MG tablet    NIFEdipine (PROCARDIA-XL) 60 MG (OSM) 24 hr tablet    Other Relevant Orders    Lipid Panel    Comprehensive Metabolic Panel    CBC Auto Differential    TSH    Lipid Panel       Immunology/Multi System    Elevated rheumatoid factor    Relevant Medications    diclofenac sodium (VOLTAREN) 1 % Gel  Externa and 50 mg p.o. daily with omeprazole 40 mg p.o. 30 minutes prior to its usee is to prevent gastritis/GERD/PUD.       Oncology    Secondary polycythemia  Currently stable  Follow-up with [facility] Hematology       GI    GERD (gastroesophageal reflux disease)       Orthopedic    Thumb pain    Relevant Medications    diclofenac sodium (VOLTAREN) 1 % Gel       Other    NSAID  long-term use    Relevant Medications    diclofenac sodium (VOLTAREN) 1 % Gel      Other Visit Diagnoses     History of endogenous hypertriglyceridemia      Controlled     Relevant Orders    Lipid Panel- within 2 -3 days    Lipid Panel- 1 week prior to 4 mo visit        Follow up in about 4 months (around 2022) for Chronic issues- thumb pain with RF+, HTN.    Medications Ordered This Encounter   Medications    aspirin (ECOTRIN) 81 MG EC tablet     Sig: Take 1 tablet (81 mg total) by mouth once daily.     Dispense:  30 tablet     Refill:  11    diclofenac sodium (VOLTAREN) 1 % Gel     Sig: Apply 2 g topically once daily.     Dispense:  100 g     Refill:  11    hydrALAZINE (APRESOLINE) 25 MG tablet     Sig: Take 1 tablet (25 mg total) by mouth once daily at 6am.     Dispense:  30 tablet     Refill:  11     .    losartan (COZAAR) 100 MG tablet     Sig: Take 1 tablet (100 mg total) by mouth once daily.     Dispense:  30 tablet     Refill:  11     .    meloxicam (MOBIC) 7.5 MG tablet     Si tab po daily prn pain with food and plenty of water. May take another tab (total 2 tabs daily) in PM as needed for pain.     Dispense:  60 tablet     Refill:  5    NIFEdipine (PROCARDIA-XL) 60 MG (OSM) 24 hr tablet     Sig: Take 1 tablet (60 mg total) by mouth once daily.     Dispense:  30 tablet     Refill:  11     .    pantoprazole (PROTONIX) 20 MG tablet     Sig: Take 1 tablet (20 mg total) by mouth once daily.     Dispense:  30 tablet     Refill:  11     [unfilled]  Medication List with Changes/Refills   New Medications    MELOXICAM (MOBIC) 7.5 MG TABLET    1 tab po daily prn pain with food and plenty of water. May take another tab (total 2 tabs daily) in PM as needed for pain.   Changed and/or Refilled Medications    Modified Medication Previous Medication    ASPIRIN (ECOTRIN) 81 MG EC TABLET aspirin (ECOTRIN) 81 MG EC tablet       Take 1 tablet (81 mg total) by mouth once daily.        DICLOFENAC SODIUM  (VOLTAREN) 1 % GEL diclofenac sodium (VOLTAREN) 1 % Gel       Apply 2 g topically once daily.    Apply 2 g topically.    HYDRALAZINE (APRESOLINE) 25 MG TABLET hydrALAZINE (APRESOLINE) 25 MG tablet       Take 1 tablet (25 mg total) by mouth once daily at 6am.        LOSARTAN (COZAAR) 100 MG TABLET losartan (COZAAR) 100 MG tablet       Take 1 tablet (100 mg total) by mouth once daily.        NIFEDIPINE (PROCARDIA-XL) 60 MG (OSM) 24 HR TABLET NIFEdipine (PROCARDIA-XL) 60 MG (OSM) 24 hr tablet       Take 1 tablet (60 mg total) by mouth once daily.        PANTOPRAZOLE (PROTONIX) 20 MG TABLET pantoprazole (PROTONIX) 20 MG tablet       Take 1 tablet (20 mg total) by mouth once daily.       Discontinued Medications    LISINOPRIL-HYDROCHLOROTHIAZIDE (PRINZIDE,ZESTORETIC) 20-25 MG TAB          Medication List with Changes/Refills   New Medications    MELOXICAM (MOBIC) 7.5 MG TABLET    1 tab po daily prn pain with food and plenty of water. May take another tab (total 2 tabs daily) in PM as needed for pain.       Start Date: 5/23/2022 End Date: --   Changed and/or Refilled Medications    Modified Medication Previous Medication    ASPIRIN (ECOTRIN) 81 MG EC TABLET aspirin (ECOTRIN) 81 MG EC tablet       Take 1 tablet (81 mg total) by mouth once daily.           Start Date: 5/23/2022 End Date: 5/23/2023    Start Date: 4/28/2022 End Date: 5/23/2022    DICLOFENAC SODIUM (VOLTAREN) 1 % GEL diclofenac sodium (VOLTAREN) 1 % Gel       Apply 2 g topically once daily.    Apply 2 g topically.       Start Date: 5/23/2022 End Date: 5/23/2023    Start Date: 9/21/2021 End Date: 5/23/2022    HYDRALAZINE (APRESOLINE) 25 MG TABLET hydrALAZINE (APRESOLINE) 25 MG tablet       Take 1 tablet (25 mg total) by mouth once daily at 6am.           Start Date: 5/23/2022 End Date: 5/23/2023    Start Date: 5/13/2022 End Date: 5/23/2022    LOSARTAN (COZAAR) 100 MG TABLET losartan (COZAAR) 100 MG tablet       Take 1 tablet (100 mg total) by mouth once daily.            Start Date: 5/23/2022 End Date: 5/23/2023    Start Date: 2/22/2022 End Date: 5/23/2022    NIFEDIPINE (PROCARDIA-XL) 60 MG (OSM) 24 HR TABLET NIFEdipine (PROCARDIA-XL) 60 MG (OSM) 24 hr tablet       Take 1 tablet (60 mg total) by mouth once daily.           Start Date: 5/23/2022 End Date: 5/23/2023    Start Date: 4/28/2022 End Date: 5/23/2022    PANTOPRAZOLE (PROTONIX) 20 MG TABLET pantoprazole (PROTONIX) 20 MG tablet       Take 1 tablet (20 mg total) by mouth once daily.           Start Date: 5/23/2022 End Date: 5/23/2023    Start Date: 1/24/2022 End Date: 5/23/2022   Discontinued Medications    LISINOPRIL-HYDROCHLOROTHIAZIDE (PRINZIDE,ZESTORETIC) 20-25 MG TAB           Start Date: 4/5/2022  End Date: 5/23/2022          Orders Placed This Encounter   Procedures    Lipid Panel    Comprehensive Metabolic Panel     Standing Status:   Future     Standing Expiration Date:   11/16/2022    CBC Auto Differential     Standing Status:   Future     Standing Expiration Date:   11/16/2022    TSH     Standing Status:   Future     Standing Expiration Date:   11/16/2022    Lipid Panel     Standing Status:   Future     Standing Expiration Date:   8/23/2022       Cornell Sandoval MD

## 2022-05-23 NOTE — PATIENT INSTRUCTIONS
DASH Diet   About this topic   DASH stands for Dietary Approaches to Stop Hypertension. The DASH diet may help you lower blood pressure. It may also help keep you from getting high blood pressure. You will eat less fat and more fiber on the DASH diet.  This diet gives you more minerals that fight high blood pressure. Some nutrients in this diet are:  Potassium ? Acts to help you get rid of salt. This may help to lower blood pressure.  Calcium ? Makes blood vessels and muscles work the right way  Magnesium - Helps blood vessels relax  Fiber ? Helps you feel full. It also helps digestion.  What will the results be?   The DASH diet may help you:  Lower your blood pressure and cholesterol  Lower your risk for cancer, heart disease, heart attack, and stroke. It may also lower your risk for heart failure, kidney stones, and diabetes.  Lose weight or keep a healthy weight  What lifestyle changes are needed?   Add regular exercise to get the most help from this diet.  Try to lower stress. Find ways to relax.  Stop smoking. Avoid secondhand smoke.  Limit alcohol intake.  What changes to diet are needed?   Know about poor eating habits. Then, you can fix them as you work with the program.  This diet encourages fruits and vegetables, whole grains, lean meats, healthy fats, and low-fat or fat-free dairy products.  This diet is lower in saturated fats, trans-fats, cholesterol, added sugars, and sodium.  Who should use this diet?   This eating plan is good for the whole family. It is also good for people with high blood pressure and those at risk for high blood pressure.  What foods are good to eat?   Grains: Try to eat 6 to 8 servings of whole grain, high fiber foods each day. These are bread, cereals, brown rice, or pasta.  Fruits and vegetables: Eat 4 to 5 servings each day. Try to pick many kinds and colors. Fresh or frozen are best. Look for low sodium or salt-free if you choose canned.  Dairy: Try to eat 2 to 3 servings of  fat free and low fat milk products each day.  Lean meats, poultry, and seafood: Try to eat 6 servings or less of lean meats, poultry, and seafood each day. Try to choose more low fat or lean meats like chicken and turkey. Eat less red meat. Eat more fish instead.  Nuts, seeds, and legumes (dry beans and peas): Try to eat 4 to 5 servings each week. Try to pick nuts such as almonds and walnuts, sunflower seeds, peanut butter, soy beans, lentils, kidney beans, and split peas.  Fats and oils: Try to eat 2 to 3 servings of fats and oils each day. Eat good fats found in fish, nuts, and avocados. Try using olive oil or vegetable oils such as canola oil. Other good oils to try are corn, safflower, sunflower, or soybean oils. Use low-sodium and low-fat salad dressing and mayonnaise.  Condiments: Pepper, herbs, spices, vinegar, lemon or lime juices are great for seasoning. Be careful to choose low-sodium or salt-free products if you use broths, soups, or soy sauce.  Sweets: Try to eat less than 5 servings each week. Choose low-fat and trans fat-free desserts. These are things like fruit flavored gelatin, sorbet, jelly beans, lupe crackers, animal crackers, low-fat fig bars, and leonid snaps. Eat fruit to satisfy your desire for sweets.     What foods should be limited or avoided?   Grains: Salted breads, rolls, crackers, quick breads, self-rising flours, biscuit mixes, regular bread crumbs, instant hot cereals, commercially-prepared rice, pasta, stuffing mixes  Fruits and vegetables: Commercially-prepared potatoes and vegetable mixes, regular canned vegetables and juices, vegetables frozen with sauce or pickled vegetables, processed fruits with salt or sodium  Milk: Whole milk, malted milk, chocolate milk, buttermilk, cheese, ice cream  Meats and beans: Smoked, cured, salted, or canned fish; meats or poultry such as sparrow, sausages, sardines; high-fat cuts of meat like beef, lamb, or pork; chicken with the skin on it  Fats:  Cut back on solid fats like butter, lard, and margarine. Eat less food with high saturated fat, cholesterol and total fat.  Condiments and snacks: Salted and canned peas, beans, and olives; salted snack foods; fried foods; soda or other sweetened drinks  Sweets: High-fat baked goods such as muffins, donuts, pastries, commercial baked goods, candy bars  If you choose to drink alcohol, limit the amount you drink. Women should have 1 drink or less per day and men should have 2 drinks or less per day.  Helpful tips   Avoid eating canned vegetables and processed foods. These have a lot of salt in them. Look for a low-salt or low-sodium choice.  Try baking or broiling instead of frying food.  Write down the foods you eat. This will help you track what you have eaten each week.  When you go to a grocery store, have a list or a meal plan. Do not shop when you are hungry to avoid cravings for foods.  Read food labels with care. They will show you how much is in a serving. The amount is given as a percentage of the total amount you need each day. Reading labels will help you make healthy food choices.       Avoid fast foods.  Talk to your doctor or dietitian to see if you need vitamin and mineral supplements to help you balance your diet.  Talk to a dietitian for help.  Where can I learn more?   [employer]  https://www.eatright.org/health/wellness/heart-and-cardiovascular-health/dash-diet-reducing-hypertension-through-diet-and-lifestyle   [employer].org  http://familydoctor.org/familydoctor/en/prevention-wellness/food-nutrition/weight-loss/the-dash-diet-healthy-eating-to-control-your-blood-pressure.html   Last Reviewed Date   2021-03-15  Consumer Information Use and Disclaimer   This information is not specific medical advice and does not replace information you receive from your health care provider. This is only a brief summary of general information. It does NOT include all information about  conditions, illnesses, injuries, tests, procedures, treatments, therapies, discharge instructions or life-style choices that may apply to you. You must talk with your health care provider for complete information about your health and treatment options. This information should not be used to decide whether or not to accept your health care providers advice, instructions or recommendations. Only your health care provider has the knowledge and training to provide advice that is right for you.  Copyright   Copyright © 2021 [employer] and its affiliates and/or licensors. All rights reserved.

## 2022-05-25 DIAGNOSIS — D75.1 SECONDARY POLYCYTHEMIA: Primary | ICD-10-CM

## 2022-05-26 DIAGNOSIS — D75.1 SECONDARY POLYCYTHEMIA: Primary | ICD-10-CM

## 2022-05-30 ENCOUNTER — CLINICAL SUPPORT (OUTPATIENT)
Dept: HEMATOLOGY/ONCOLOGY | Facility: CLINIC | Age: 42
End: 2022-05-30
Payer: MEDICAID

## 2022-05-30 ENCOUNTER — INFUSION (OUTPATIENT)
Dept: INFUSION THERAPY | Facility: HOSPITAL | Age: 42
End: 2022-05-30
Attending: INTERNAL MEDICINE
Payer: MEDICAID

## 2022-05-30 DIAGNOSIS — D75.1 SECONDARY POLYCYTHEMIA: ICD-10-CM

## 2022-05-30 LAB
ALBUMIN SERPL-MCNC: 4 GM/DL (ref 3.5–5)
ALBUMIN/GLOB SERPL: 1 RATIO (ref 1.1–2)
ALP SERPL-CCNC: 104 UNIT/L (ref 40–150)
ALT SERPL-CCNC: 18 UNIT/L (ref 0–55)
AST SERPL-CCNC: 22 UNIT/L (ref 5–34)
BASOPHILS # BLD AUTO: 0.04 X10(3)/MCL (ref 0–0.2)
BASOPHILS NFR BLD AUTO: 0.8 %
BILIRUBIN DIRECT+TOT PNL SERPL-MCNC: 0.5 MG/DL
BUN SERPL-MCNC: 7.5 MG/DL (ref 8.9–20.6)
CALCIUM SERPL-MCNC: 9.8 MG/DL (ref 8.4–10.2)
CHLORIDE SERPL-SCNC: 103 MMOL/L (ref 98–107)
CO2 SERPL-SCNC: 25 MMOL/L (ref 22–29)
CREAT SERPL-MCNC: 0.89 MG/DL (ref 0.73–1.18)
EOSINOPHIL # BLD AUTO: 0.06 X10(3)/MCL (ref 0–0.9)
EOSINOPHIL NFR BLD AUTO: 1.1 %
ERYTHROCYTE [DISTWIDTH] IN BLOOD BY AUTOMATED COUNT: 16.1 % (ref 11.5–17)
GLOBULIN SER-MCNC: 4.2 GM/DL (ref 2.4–3.5)
GLUCOSE SERPL-MCNC: 89 MG/DL (ref 74–100)
HCT VFR BLD AUTO: 44.1 % (ref 42–52)
HGB BLD-MCNC: 13.4 GM/DL (ref 14–18)
IMM GRANULOCYTES # BLD AUTO: 0.01 X10(3)/MCL (ref 0–0.02)
IMM GRANULOCYTES NFR BLD AUTO: 0.2 % (ref 0–0.43)
LYMPHOCYTES # BLD AUTO: 1.67 X10(3)/MCL (ref 0.6–4.6)
LYMPHOCYTES NFR BLD AUTO: 31.4 %
MCH RBC QN AUTO: 24.1 PG (ref 27–31)
MCHC RBC AUTO-ENTMCNC: 30.4 MG/DL (ref 33–36)
MCV RBC AUTO: 79.5 FL (ref 80–94)
MONOCYTES # BLD AUTO: 0.57 X10(3)/MCL (ref 0.1–1.3)
MONOCYTES NFR BLD AUTO: 10.7 %
NEUTROPHILS # BLD AUTO: 3 X10(3)/MCL (ref 2.1–9.2)
NEUTROPHILS NFR BLD AUTO: 55.8 %
NRBC BLD AUTO-RTO: 0 %
PLATELET # BLD AUTO: 240 X10(3)/MCL (ref 130–400)
PMV BLD AUTO: 9.7 FL (ref 9.4–12.4)
POTASSIUM SERPL-SCNC: 3.7 MMOL/L (ref 3.5–5.1)
PROT SERPL-MCNC: 8.2 GM/DL (ref 6.4–8.3)
RBC # BLD AUTO: 5.55 X10(6)/MCL (ref 4.7–6.1)
SODIUM SERPL-SCNC: 139 MMOL/L (ref 136–145)
WBC # SPEC AUTO: 5.3 X10(3)/MCL (ref 4.5–11.5)

## 2022-05-30 PROCEDURE — 36415 COLL VENOUS BLD VENIPUNCTURE: CPT

## 2022-05-30 PROCEDURE — 80053 COMPREHEN METABOLIC PANEL: CPT

## 2022-05-30 PROCEDURE — 85025 COMPLETE CBC W/AUTO DIFF WBC: CPT

## 2022-05-30 NOTE — NURSING
Phlebotomy held today.  Hct 44.1 which is below goal of 45.  Pt scheduled for next month for lab, provider, phlebotomy.

## 2022-06-27 ENCOUNTER — OFFICE VISIT (OUTPATIENT)
Dept: HEMATOLOGY/ONCOLOGY | Facility: CLINIC | Age: 42
End: 2022-06-27
Payer: MEDICAID

## 2022-06-27 VITALS
TEMPERATURE: 98 F | DIASTOLIC BLOOD PRESSURE: 82 MMHG | WEIGHT: 159.19 LBS | HEIGHT: 65 IN | OXYGEN SATURATION: 100 % | SYSTOLIC BLOOD PRESSURE: 145 MMHG | HEART RATE: 89 BPM | BODY MASS INDEX: 26.52 KG/M2

## 2022-06-27 DIAGNOSIS — D75.1 ERYTHROCYTOSIS: Primary | ICD-10-CM

## 2022-06-27 PROCEDURE — 99213 PR OFFICE/OUTPT VISIT, EST, LEVL III, 20-29 MIN: ICD-10-PCS | Mod: S$PBB,,, | Performed by: NURSE PRACTITIONER

## 2022-06-27 PROCEDURE — 4010F PR ACE/ARB THEARPY RXD/TAKEN: ICD-10-PCS | Mod: CPTII,,, | Performed by: NURSE PRACTITIONER

## 2022-06-27 PROCEDURE — 4010F ACE/ARB THERAPY RXD/TAKEN: CPT | Mod: CPTII,,, | Performed by: NURSE PRACTITIONER

## 2022-06-27 PROCEDURE — 3079F PR MOST RECENT DIASTOLIC BLOOD PRESSURE 80-89 MM HG: ICD-10-PCS | Mod: CPTII,,, | Performed by: NURSE PRACTITIONER

## 2022-06-27 PROCEDURE — 3008F PR BODY MASS INDEX (BMI) DOCUMENTED: ICD-10-PCS | Mod: CPTII,,, | Performed by: NURSE PRACTITIONER

## 2022-06-27 PROCEDURE — 3077F PR MOST RECENT SYSTOLIC BLOOD PRESSURE >= 140 MM HG: ICD-10-PCS | Mod: CPTII,,, | Performed by: NURSE PRACTITIONER

## 2022-06-27 PROCEDURE — 85025 COMPLETE CBC W/AUTO DIFF WBC: CPT | Performed by: INTERNAL MEDICINE

## 2022-06-27 PROCEDURE — 3079F DIAST BP 80-89 MM HG: CPT | Mod: CPTII,,, | Performed by: NURSE PRACTITIONER

## 2022-06-27 PROCEDURE — 1160F PR REVIEW ALL MEDS BY PRESCRIBER/CLIN PHARMACIST DOCUMENTED: ICD-10-PCS | Mod: CPTII,,, | Performed by: NURSE PRACTITIONER

## 2022-06-27 PROCEDURE — 3077F SYST BP >= 140 MM HG: CPT | Mod: CPTII,,, | Performed by: NURSE PRACTITIONER

## 2022-06-27 PROCEDURE — 99213 OFFICE O/P EST LOW 20 MIN: CPT | Mod: S$PBB,,, | Performed by: NURSE PRACTITIONER

## 2022-06-27 PROCEDURE — 1160F RVW MEDS BY RX/DR IN RCRD: CPT | Mod: CPTII,,, | Performed by: NURSE PRACTITIONER

## 2022-06-27 PROCEDURE — 3008F BODY MASS INDEX DOCD: CPT | Mod: CPTII,,, | Performed by: NURSE PRACTITIONER

## 2022-06-27 PROCEDURE — 1159F PR MEDICATION LIST DOCUMENTED IN MEDICAL RECORD: ICD-10-PCS | Mod: CPTII,,, | Performed by: NURSE PRACTITIONER

## 2022-06-27 PROCEDURE — 80053 COMPREHEN METABOLIC PANEL: CPT | Performed by: INTERNAL MEDICINE

## 2022-06-27 PROCEDURE — 1159F MED LIST DOCD IN RCRD: CPT | Mod: CPTII,,, | Performed by: NURSE PRACTITIONER

## 2022-06-27 PROCEDURE — 99214 OFFICE O/P EST MOD 30 MIN: CPT | Mod: PBBFAC | Performed by: NURSE PRACTITIONER

## 2022-06-27 NOTE — PROGRESS NOTES
Problem List:  Severe erythrocytosis    Current Treatment:  ASA 81mg po daily.  Therapeutic phlebotomy to keep Hct <45%    History of Present Illness:  The patient is a pleasant 36-year-old -American man. For a full, detailed history, please see the note dated 8/9/2021.    Interval History 6/27/2022: Patient presents today for scheduled follow up for erythrocytosis. He reports doing well,without any complaints. Lab work reviewed with patient, stable. Hct 41.1, Hgb 12.6. therefore no phlebotomy needed. He reports that he has been having heartburn. Patient informed that his PCP had already sent a PPI (Protonix) to patient pharmacy and he should check with pharmacy for refill. Patient verbalized understanding.       Review of Systems: A complete 12-point ROS was performed in full with pertinent positives as described in interval history. Remainder of ROS done in full and are negative.    Lab Results   Component Value Date    WBC 5.1 06/27/2022    HGB 12.6 (L) 06/27/2022    HCT 41.1 (L) 06/27/2022    MCV 75.8 (L) 06/27/2022     06/27/2022       CMP  Sodium Level   Date Value Ref Range Status   06/27/2022 138 136 - 145 mmol/L Final     Potassium Level   Date Value Ref Range Status   06/27/2022 3.8 3.5 - 5.1 mmol/L Final     Carbon Dioxide   Date Value Ref Range Status   06/27/2022 24 22 - 29 mmol/L Final     Blood Urea Nitrogen   Date Value Ref Range Status   06/27/2022 9.0 8.9 - 20.6 mg/dL Final     Creatinine   Date Value Ref Range Status   06/27/2022 0.82 0.73 - 1.18 mg/dL Final     Calcium Level Total   Date Value Ref Range Status   06/27/2022 9.2 8.4 - 10.2 mg/dL Final     Albumin Level   Date Value Ref Range Status   06/27/2022 4.0 3.5 - 5.0 gm/dL Final     Bilirubin Total   Date Value Ref Range Status   06/27/2022 0.5 <=1.5 mg/dL Final     Alkaline Phosphatase   Date Value Ref Range Status   06/27/2022 91 40 - 150 unit/L Final     Aspartate Aminotransferase   Date Value Ref Range Status   06/27/2022  24 5 - 34 unit/L Final     Alanine Aminotransferase   Date Value Ref Range Status   06/27/2022 14 0 - 55 unit/L Final     Estimated GFR-Non    Date Value Ref Range Status   04/18/2022 96 >=90        Physical Exam    Vitals & Measurements  Vitals:    06/27/22 1101   BP: (!) 145/82   Pulse: 89   Temp: 98.1 °F (36.7 °C)       General: Alert and oriented. NAD  Eye: Pupils are equal, round and reactive to light, Extraocular movements are intact. Normal conjunctiva  HENT: Normocephalic. Oropharynx exam deferred; mask in place due to coronavirus  Neck: Supple, Non-tender  Respiratory: Respirations are non-labored, Symmetrical chest wall expansion. Breath sounds CTA bilaterally  Cardiovascular: Regular rate, rhythm, Normal peripheral perfusion, No bilateral lower extremity edema  Gastrointestinal: Non-distended, Present bowel sounds   Genitourinary: Exam deferred  Lymphatics: No lymphadenopathy appreciated  Musculoskeletal: Moves all extremities  Integumentary: Intact. Warm, dry. No rashes, or lesions to visible skin  Neurologic: No focal deficits  Psychiatric: Cooperative. 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 Erythropoietin level is not suppressed; this is a point against polycythemia vera. Bone marrow examination does not suggest polycythemia vera. Calretinin mutation negative in bone marrow. JANESSA-2 mutation negative (both JANESSA-2 V617F, as well as exons 12-15 are negative)  -->  Polycythemia vera ruled out   (erythropoietin level not suppressed; JANESSA mutation negative)  Patient has some kind of secondary erythrocytosis   -Phlebotomized: 11/20/2017; 12/11/2017; 01/02/2018; 03/12/2019 (H/H 18.6/51.4); 07/15/2019 (H/H  18.0/52.2); 10/14/2019 (H/H 18.1/53.6)  -H/H: 16.2/48.8 (12/09/2019); 16.2/48.8 (11/11/2019; 16.0/48.1 (10/21/2019); 18.1/53.6 (10/08/2019), etc.  -After last therapeutic phlebotomy on 10/14/2019 (H/H 18.1/23.6), his H/H has remained stable, 16.6/49.9 as of 02/10/2020  -Therapeutic phlebotomy 605 cc on 04/21/2020 (H/H 17.7/52.2)  -07/06/2020: H/H 17.4/51.6  -Therapeutic phlebotomy 07/09/2020 (H/H 17.4/51.6), 07/20/2020 (H/H 15.3/45.3), 07/27/2020 (H/H 14.8/45.2)  -08/03/2020: H/H 14.0/42.2 (phlebotomy held)  -08/11/2020: H/H 13.9/41.4 (phlebotomy held)  -09/14/2020: CBC reviewed. H/H 14.6/44.1.      Plan:  -09/15/2020: No indication of phlebotomy at this time  -Recheck CBC in 1 month to assess the need of therapeutic phlebotomy, then follow-up visit    -No evidence of polycythemia vera  -Has secondary polycythemia  -No symptoms of hyperviscosity state  -However, has behaved more or less like polycythemia vera (persistently elevated H/H), requiring periodic therapeutic phlebotomy treatments    -Therapeutic phlebotomy as needed  -Target hematocrit <45  -Hold phlebotomy if hematocrit <45  -Continue baby aspirin 81 mg p.o. daily    -He has quit smoking    Discussion:  He has secondary erythrocytosis of unclear etiology.   No evidence of polycythemia vera (normal erythropoietin level and negative JANESSA-2 mutation rule out polycythemia vera).  However, in terms of persistence of elevated H/H, has behaved more or less like polycythemia vera.    Had no symptoms of hyperviscosity state at any time despite severe elevation of hemoglobin.    Regardless, to be on the safer side, will continue therapeutic phlebotomy as needed.    Hct 41.1; no phlebotomy today  Therapeutic phlebotomy to keep Hct <45%.  RTC Q month with CBC, infusion nurses to review only give phlebotomy if HCT >45%  Follow up in 3 months (F2F) with CBC, CMP  Continue ASA 81mg PO daily.

## 2022-06-27 NOTE — Clinical Note
Follow up with np in 3 months with lab work and infusion for possible phlebotomy Lab work only monthly  Monday appointments only per patient request

## 2022-07-06 ENCOUNTER — TELEPHONE (OUTPATIENT)
Dept: FAMILY MEDICINE | Facility: CLINIC | Age: 42
End: 2022-07-06
Payer: MEDICAID

## 2022-07-06 NOTE — TELEPHONE ENCOUNTER
Patient called stating that his appetite has decreased over the past 2 weeks. Patient would like to know if you can prescribe him something for this.

## 2022-07-25 ENCOUNTER — INFUSION (OUTPATIENT)
Dept: INFUSION THERAPY | Facility: HOSPITAL | Age: 42
End: 2022-07-25
Attending: INTERNAL MEDICINE
Payer: MEDICAID

## 2022-07-25 ENCOUNTER — CLINICAL SUPPORT (OUTPATIENT)
Dept: HEMATOLOGY/ONCOLOGY | Facility: CLINIC | Age: 42
End: 2022-07-25
Payer: MEDICAID

## 2022-07-25 VITALS
DIASTOLIC BLOOD PRESSURE: 96 MMHG | RESPIRATION RATE: 18 BRPM | OXYGEN SATURATION: 100 % | BODY MASS INDEX: 26.96 KG/M2 | SYSTOLIC BLOOD PRESSURE: 142 MMHG | HEART RATE: 84 BPM | HEIGHT: 65 IN | WEIGHT: 161.81 LBS | TEMPERATURE: 99 F

## 2022-07-25 DIAGNOSIS — D75.1 SECONDARY POLYCYTHEMIA: ICD-10-CM

## 2022-07-25 LAB
ALBUMIN SERPL-MCNC: 3.8 GM/DL (ref 3.5–5)
ALBUMIN/GLOB SERPL: 0.9 RATIO (ref 1.1–2)
ALP SERPL-CCNC: 88 UNIT/L (ref 40–150)
ALT SERPL-CCNC: 22 UNIT/L (ref 0–55)
AST SERPL-CCNC: 28 UNIT/L (ref 5–34)
BASOPHILS # BLD AUTO: 0.03 X10(3)/MCL (ref 0–0.2)
BASOPHILS NFR BLD AUTO: 0.6 %
BILIRUBIN DIRECT+TOT PNL SERPL-MCNC: 0.5 MG/DL
BUN SERPL-MCNC: 7.6 MG/DL (ref 8.9–20.6)
CALCIUM SERPL-MCNC: 9.3 MG/DL (ref 8.4–10.2)
CHLORIDE SERPL-SCNC: 105 MMOL/L (ref 98–107)
CO2 SERPL-SCNC: 24 MMOL/L (ref 22–29)
CREAT SERPL-MCNC: 0.85 MG/DL (ref 0.73–1.18)
EOSINOPHIL # BLD AUTO: 0.01 X10(3)/MCL (ref 0–0.9)
EOSINOPHIL NFR BLD AUTO: 0.2 %
ERYTHROCYTE [DISTWIDTH] IN BLOOD BY AUTOMATED COUNT: 16.6 % (ref 11.5–17)
GLOBULIN SER-MCNC: 4.1 GM/DL (ref 2.4–3.5)
GLUCOSE SERPL-MCNC: 78 MG/DL (ref 74–100)
HCT VFR BLD AUTO: 43 % (ref 42–52)
HGB BLD-MCNC: 12.9 GM/DL (ref 14–18)
IMM GRANULOCYTES # BLD AUTO: 0.01 X10(3)/MCL (ref 0–0.04)
IMM GRANULOCYTES NFR BLD AUTO: 0.2 %
LYMPHOCYTES # BLD AUTO: 1 X10(3)/MCL (ref 0.6–4.6)
LYMPHOCYTES NFR BLD AUTO: 19.9 %
MCH RBC QN AUTO: 22.8 PG (ref 27–31)
MCHC RBC AUTO-ENTMCNC: 30 MG/DL (ref 33–36)
MCV RBC AUTO: 75.8 FL (ref 80–94)
MONOCYTES # BLD AUTO: 0.41 X10(3)/MCL (ref 0.1–1.3)
MONOCYTES NFR BLD AUTO: 8.2 %
NEUTROPHILS # BLD AUTO: 3.6 X10(3)/MCL (ref 2.1–9.2)
NEUTROPHILS NFR BLD AUTO: 70.9 %
NRBC BLD AUTO-RTO: 0 %
PLATELET # BLD AUTO: 240 X10(3)/MCL (ref 130–400)
PMV BLD AUTO: 10.1 FL (ref 7.4–10.4)
POTASSIUM SERPL-SCNC: 3.6 MMOL/L (ref 3.5–5.1)
PROT SERPL-MCNC: 7.9 GM/DL (ref 6.4–8.3)
RBC # BLD AUTO: 5.67 X10(6)/MCL (ref 4.7–6.1)
SODIUM SERPL-SCNC: 139 MMOL/L (ref 136–145)
WBC # SPEC AUTO: 5 X10(3)/MCL (ref 4.5–11.5)

## 2022-07-25 PROCEDURE — 85025 COMPLETE CBC W/AUTO DIFF WBC: CPT

## 2022-07-25 PROCEDURE — 80053 COMPREHEN METABOLIC PANEL: CPT

## 2022-07-25 PROCEDURE — 36415 COLL VENOUS BLD VENIPUNCTURE: CPT

## 2022-07-28 ENCOUNTER — OFFICE VISIT (OUTPATIENT)
Dept: RHEUMATOLOGY | Facility: CLINIC | Age: 42
End: 2022-07-28
Payer: MEDICAID

## 2022-07-28 VITALS
OXYGEN SATURATION: 98 % | TEMPERATURE: 99 F | RESPIRATION RATE: 18 BRPM | HEIGHT: 65 IN | DIASTOLIC BLOOD PRESSURE: 78 MMHG | BODY MASS INDEX: 26.39 KG/M2 | WEIGHT: 158.38 LBS | SYSTOLIC BLOOD PRESSURE: 126 MMHG | HEART RATE: 80 BPM

## 2022-07-28 DIAGNOSIS — K21.9 GASTROESOPHAGEAL REFLUX DISEASE WITHOUT ESOPHAGITIS: ICD-10-CM

## 2022-07-28 DIAGNOSIS — M19.90 INFLAMMATORY ARTHRITIS: Primary | ICD-10-CM

## 2022-07-28 DIAGNOSIS — F41.1 GENERALIZED ANXIETY DISORDER: ICD-10-CM

## 2022-07-28 PROCEDURE — 3008F BODY MASS INDEX DOCD: CPT | Mod: CPTII,,, | Performed by: INTERNAL MEDICINE

## 2022-07-28 PROCEDURE — 1160F RVW MEDS BY RX/DR IN RCRD: CPT | Mod: CPTII,,, | Performed by: INTERNAL MEDICINE

## 2022-07-28 PROCEDURE — 3008F PR BODY MASS INDEX (BMI) DOCUMENTED: ICD-10-PCS | Mod: CPTII,,, | Performed by: INTERNAL MEDICINE

## 2022-07-28 PROCEDURE — 1159F MED LIST DOCD IN RCRD: CPT | Mod: CPTII,,, | Performed by: INTERNAL MEDICINE

## 2022-07-28 PROCEDURE — 99999 PR PBB SHADOW E&M-EST. PATIENT-LVL IV: CPT | Mod: PBBFAC,,, | Performed by: INTERNAL MEDICINE

## 2022-07-28 PROCEDURE — 1160F PR REVIEW ALL MEDS BY PRESCRIBER/CLIN PHARMACIST DOCUMENTED: ICD-10-PCS | Mod: CPTII,,, | Performed by: INTERNAL MEDICINE

## 2022-07-28 PROCEDURE — 99204 OFFICE O/P NEW MOD 45 MIN: CPT | Mod: S$PBB,,, | Performed by: INTERNAL MEDICINE

## 2022-07-28 PROCEDURE — 99999 PR PBB SHADOW E&M-EST. PATIENT-LVL IV: ICD-10-PCS | Mod: PBBFAC,,, | Performed by: INTERNAL MEDICINE

## 2022-07-28 PROCEDURE — 4010F PR ACE/ARB THEARPY RXD/TAKEN: ICD-10-PCS | Mod: CPTII,,, | Performed by: INTERNAL MEDICINE

## 2022-07-28 PROCEDURE — 1159F PR MEDICATION LIST DOCUMENTED IN MEDICAL RECORD: ICD-10-PCS | Mod: CPTII,,, | Performed by: INTERNAL MEDICINE

## 2022-07-28 PROCEDURE — 4010F ACE/ARB THERAPY RXD/TAKEN: CPT | Mod: CPTII,,, | Performed by: INTERNAL MEDICINE

## 2022-07-28 PROCEDURE — 99204 PR OFFICE/OUTPT VISIT, NEW, LEVL IV, 45-59 MIN: ICD-10-PCS | Mod: S$PBB,,, | Performed by: INTERNAL MEDICINE

## 2022-07-28 PROCEDURE — 99214 OFFICE O/P EST MOD 30 MIN: CPT | Mod: PBBFAC | Performed by: INTERNAL MEDICINE

## 2022-07-28 RX ORDER — HYDROXYCHLOROQUINE SULFATE 200 MG/1
200 TABLET, FILM COATED ORAL 2 TIMES DAILY
Qty: 60 TABLET | Refills: 5 | Status: SHIPPED | OUTPATIENT
Start: 2022-07-28 | End: 2023-01-28

## 2022-07-28 RX ORDER — HYDROXYZINE PAMOATE 25 MG/1
25 CAPSULE ORAL 3 TIMES DAILY
COMMUNITY
Start: 2022-01-24

## 2022-07-28 RX ORDER — OMEPRAZOLE 40 MG/1
40 CAPSULE, DELAYED RELEASE ORAL DAILY
Qty: 30 CAPSULE | Refills: 11 | Status: SHIPPED | OUTPATIENT
Start: 2022-07-28 | End: 2023-03-20 | Stop reason: SDUPTHER

## 2022-07-28 RX ORDER — DICLOFENAC SODIUM 50 MG/1
50 TABLET, DELAYED RELEASE ORAL 2 TIMES DAILY PRN
Qty: 60 TABLET | Refills: 5 | Status: SHIPPED | OUTPATIENT
Start: 2022-07-28 | End: 2023-03-20 | Stop reason: SDUPTHER

## 2022-07-28 RX ORDER — LISINOPRIL AND HYDROCHLOROTHIAZIDE 20; 25 MG/1; MG/1
1 TABLET ORAL DAILY
COMMUNITY
Start: 2022-05-31 | End: 2023-04-25

## 2022-07-28 NOTE — PROGRESS NOTES
"Subjective:       Patient ID: Sean Dunham is a 41 y.o. male.    Chief Complaint: New Patient (Referral from Dr. Qasim Sandoval.. Generalized pain./Bilateral Thumb pain )    Pt  is complaining of joint pain involving his MCP PIP wrist elbow shoulders hips knees and ankles bilaterally.  Is 10/10 in intensity dull in quality and continuous.  It is associated with a morning stiffness lasting for more than 60 minutes. Pt reports having difficulty maintaining a good night of sleep and this has been associated with myalgia of 10/10 in intensity.  This pain is dull continuous and gets worse mainly at night.  It is associated with fatigue.  No fever no chills no others.      Review of Systems   Constitutional: Negative for appetite change, chills and fever.   HENT: Negative for congestion, ear pain, mouth sores, nosebleeds and trouble swallowing.    Eyes: Negative for photophobia and discharge.   Respiratory: Negative for chest tightness and shortness of breath.    Cardiovascular: Negative for chest pain.   Gastrointestinal: Negative for abdominal pain and vomiting.   Endocrine: Negative.    Genitourinary: Negative for hematuria.   Musculoskeletal:        As per HPI   Skin: Negative for rash.   Neurological: Negative for weakness.         Objective:   /78 (BP Location: Left arm, Patient Position: Sitting, BP Method: Medium (Automatic))   Pulse 80   Temp 98.5 °F (36.9 °C) (Oral)   Resp 18   Ht 5' 5" (1.651 m)   Wt 71.8 kg (158 lb 6.4 oz)   SpO2 98%   BMI 26.36 kg/m²      Physical Exam   Constitutional: He is oriented to person, place, and time. He appears well-developed and well-nourished. No distress.   HENT:   Head: Normocephalic and atraumatic.   Right Ear: External ear normal.   Left Ear: External ear normal.   Eyes: Pupils are equal, round, and reactive to light.   Cardiovascular: Normal rate, regular rhythm and normal heart sounds.   Pulmonary/Chest: Breath sounds normal.   Abdominal: Soft. " There is no abdominal tenderness.   Musculoskeletal:      Right shoulder: Tenderness present.      Left shoulder: Tenderness present.      Right elbow: Tenderness present.      Left elbow: Tenderness present.      Right wrist: Tenderness present.      Left wrist: Tenderness present.      Cervical back: Neck supple.      Right hip: Tenderness present.      Left hip: Tenderness present.      Right knee: Tenderness present.      Left knee: Tenderness present.      Right ankle: Tenderness present.      Left ankle: Tenderness present.   Lymphadenopathy:     He has no cervical adenopathy.   Neurological: He is alert and oriented to person, place, and time. He displays normal reflexes. No cranial nerve deficit or sensory deficit. He exhibits normal muscle tone. Coordination normal.   Skin: No rash noted. No erythema.   Vitals reviewed.      Right Side Rheumatological Exam     The patient is tender to palpation of the shoulder, elbow, wrist, knee, 1st PIP, 1st MCP, 2nd PIP, 2nd MCP, 3rd PIP, 3rd MCP, 4th PIP, 4th MCP, 5th PIP, hip, ankle, 1st MTP, 2nd MTP, 3rd MTP, 4th MTP, 5th MTP, 1st toe IP, 2nd toe IP, 3rd toe IP, 4th toe IP and 5th toe IP    Left Side Rheumatological Exam     The patient is tender to palpation of the shoulder, elbow, wrist, knee, 1st PIP, 1st MCP, 2nd PIP, 2nd MCP, 3rd PIP, 3rd MCP, 4th PIP, 4th MCP, 5th PIP, 5th MCP, hip, ankle, 1st MTP, 2nd MTP, 3rd MTP, 4th MTP, 5th MTP, 1st toe IP, 2nd toe IP, 3rd toe IP, 4th toe IP and 5th toe IP.         Completed Fibromyalgia exam 11/18 tender points.  No data to display     Assessment:       1. Inflammatory arthritis    2. Generalized anxiety disorder    3. Gastroesophageal reflux disease without esophagitis            Plan:       Problem List Items Addressed This Visit        Psychiatric    Generalized anxiety disorder    Relevant Medications    diclofenac (VOLTAREN) 50 MG EC tablet    hydrOXYchloroQUINE (PLAQUENIL) 200 mg tablet    omeprazole (PRILOSEC) 40 MG  capsule    Other Relevant Orders    CBC Auto Differential    Comprehensive Metabolic Panel    CRP, High Sensitivity    Vitamin D    Rheumatoid Quantitative    Cyclic Citrullinated Peptide Antibody, IgG    Antinuclear Ab, HEp-2 Substrate    Hepatitis B Surface Antigen    Hepatitis C Antibody    TSH    T4, Free       GI    GERD (gastroesophageal reflux disease)    Relevant Medications    diclofenac (VOLTAREN) 50 MG EC tablet    hydrOXYchloroQUINE (PLAQUENIL) 200 mg tablet    omeprazole (PRILOSEC) 40 MG capsule    Other Relevant Orders    CBC Auto Differential    Comprehensive Metabolic Panel    CRP, High Sensitivity    Vitamin D    Rheumatoid Quantitative    Cyclic Citrullinated Peptide Antibody, IgG    Antinuclear Ab, HEp-2 Substrate    Hepatitis B Surface Antigen    Hepatitis C Antibody    TSH    T4, Free      Other Visit Diagnoses     Inflammatory arthritis    -  Primary    Relevant Medications    diclofenac (VOLTAREN) 50 MG EC tablet    hydrOXYchloroQUINE (PLAQUENIL) 200 mg tablet    omeprazole (PRILOSEC) 40 MG capsule    Other Relevant Orders    CBC Auto Differential    Comprehensive Metabolic Panel    CRP, High Sensitivity    Vitamin D    Rheumatoid Quantitative    Cyclic Citrullinated Peptide Antibody, IgG    Antinuclear Ab, HEp-2 Substrate    Hepatitis B Surface Antigen    Hepatitis C Antibody    TSH    T4, Free

## 2022-08-10 ENCOUNTER — TELEPHONE (OUTPATIENT)
Dept: RHEUMATOLOGY | Facility: CLINIC | Age: 42
End: 2022-08-10
Payer: MEDICAID

## 2022-08-10 RX ORDER — ASPIRIN 325 MG
50000 TABLET, DELAYED RELEASE (ENTERIC COATED) ORAL WEEKLY
Qty: 5 CAPSULE | Refills: 5 | Status: SHIPPED | OUTPATIENT
Start: 2022-08-10 | End: 2023-03-20 | Stop reason: SDUPTHER

## 2022-08-10 NOTE — TELEPHONE ENCOUNTER
Spoke with patient verbalized understanding . Thanks       ----- Message from Keshawn Hess MD sent at 8/10/2022  3:31 PM CDT -----  Please inform this patient that his labs are suggestive of rheumatoid arth. He needs to continue the same meds and give them some time. His vit d is 10 and it should be 50. I called in vit d for him. Thanks bh

## 2022-08-22 ENCOUNTER — APPOINTMENT (OUTPATIENT)
Dept: HEMATOLOGY/ONCOLOGY | Facility: CLINIC | Age: 42
End: 2022-08-22
Payer: MEDICAID

## 2022-08-22 ENCOUNTER — INFUSION (OUTPATIENT)
Dept: INFUSION THERAPY | Facility: HOSPITAL | Age: 42
End: 2022-08-22
Attending: INTERNAL MEDICINE
Payer: MEDICAID

## 2022-08-22 DIAGNOSIS — D75.1 SECONDARY POLYCYTHEMIA: Primary | ICD-10-CM

## 2022-08-22 DIAGNOSIS — D75.1 SECONDARY POLYCYTHEMIA: ICD-10-CM

## 2022-08-22 LAB
ALBUMIN SERPL-MCNC: 3.7 GM/DL (ref 3.5–5)
ALBUMIN/GLOB SERPL: 1 RATIO (ref 1.1–2)
ALP SERPL-CCNC: 86 UNIT/L (ref 40–150)
ALT SERPL-CCNC: 22 UNIT/L (ref 0–55)
AST SERPL-CCNC: 28 UNIT/L (ref 5–34)
BASOPHILS # BLD AUTO: 0.03 X10(3)/MCL (ref 0–0.2)
BASOPHILS NFR BLD AUTO: 0.6 %
BILIRUBIN DIRECT+TOT PNL SERPL-MCNC: 0.3 MG/DL
BUN SERPL-MCNC: 5.8 MG/DL (ref 8.9–20.6)
CALCIUM SERPL-MCNC: 9.1 MG/DL (ref 8.4–10.2)
CHLORIDE SERPL-SCNC: 107 MMOL/L (ref 98–107)
CO2 SERPL-SCNC: 22 MMOL/L (ref 22–29)
CREAT SERPL-MCNC: 0.75 MG/DL (ref 0.73–1.18)
EOSINOPHIL # BLD AUTO: 0.02 X10(3)/MCL (ref 0–0.9)
EOSINOPHIL NFR BLD AUTO: 0.4 %
ERYTHROCYTE [DISTWIDTH] IN BLOOD BY AUTOMATED COUNT: 18.1 % (ref 11.5–17)
GFR SERPLBLD CREATININE-BSD FMLA CKD-EPI: >60 MLS/MIN/1.73/M2
GLOBULIN SER-MCNC: 3.7 GM/DL (ref 2.4–3.5)
GLUCOSE SERPL-MCNC: 81 MG/DL (ref 74–100)
HCT VFR BLD AUTO: 43.4 % (ref 42–52)
HGB BLD-MCNC: 12.4 GM/DL (ref 14–18)
IMM GRANULOCYTES # BLD AUTO: 0.01 X10(3)/MCL (ref 0–0.04)
IMM GRANULOCYTES NFR BLD AUTO: 0.2 %
LYMPHOCYTES # BLD AUTO: 1.16 X10(3)/MCL (ref 0.6–4.6)
LYMPHOCYTES NFR BLD AUTO: 22.7 %
MCH RBC QN AUTO: 21.8 PG (ref 27–31)
MCHC RBC AUTO-ENTMCNC: 28.6 MG/DL (ref 33–36)
MCV RBC AUTO: 76.1 FL (ref 80–94)
MONOCYTES # BLD AUTO: 0.43 X10(3)/MCL (ref 0.1–1.3)
MONOCYTES NFR BLD AUTO: 8.4 %
NEUTROPHILS # BLD AUTO: 3.5 X10(3)/MCL (ref 2.1–9.2)
NEUTROPHILS NFR BLD AUTO: 67.7 %
NRBC BLD AUTO-RTO: 0 %
PLATELET # BLD AUTO: 234 X10(3)/MCL (ref 130–400)
PMV BLD AUTO: 9.6 FL (ref 7.4–10.4)
POTASSIUM SERPL-SCNC: 3.8 MMOL/L (ref 3.5–5.1)
PROT SERPL-MCNC: 7.4 GM/DL (ref 6.4–8.3)
RBC # BLD AUTO: 5.7 X10(6)/MCL (ref 4.7–6.1)
SODIUM SERPL-SCNC: 141 MMOL/L (ref 136–145)
TSH SERPL-ACNC: 1.5 UIU/ML (ref 0.35–4.94)
WBC # SPEC AUTO: 5.1 X10(3)/MCL (ref 4.5–11.5)

## 2022-08-22 PROCEDURE — 36415 COLL VENOUS BLD VENIPUNCTURE: CPT

## 2022-09-25 NOTE — PROGRESS NOTES
Past medical history:  Hypertension.  Tobacco abuse.      Reason for follow-up:  Secondary erythrocytosis        History of present illness:  The patient is a pleasant 36-year-old -American man.  He was apparently tased in the month of September, and went to [facility] last month complaining of pain in the left upper abdominal quadrant.  They evaluated him from cardiac standpoint, and found an abnormal EKG, probably due to LVH from hypertension.  Incidentally, hemoglobin was noted to be markedly elevated, with mild thrombus cytopenia.  He also had minimally elevated troponin levels, without atypical rise and fall pattern of acute coronary syndrome.  He was evaluated by cardiology.  His admission hemoglobin on 10/23/2017 was 24 with hematocrit of 67.  After 2 phlebotomy treatments, each 500 cc, it dropped to 22.2 g percent.  Had mild thrombus cytopenia with platelet count of 99,000.  MCV was elevated to 100.7.  In CMP, alk phos was elevated to 129, and total bilirubin 5.7.  Says that for last 6 months or so, he noticed that his eyes were bloodshot.  After 2 therapeutic phlebotomy treatments, the redness in the eyes has subsided.  Denies history of headaches, stuffiness in the head, tinnitus, nosebleeds, echogenic pruritus, anorexia, weight loss, myalgias, blood clots, or bleeding.  No history of cardiopulmonary problems or chronic dyspnea.  Erythropoietin level 6.5 on 11/15/2017, not suppressed phthisis normal range 2.6-18.5).  Of no splenomegaly noted on abdominal ultrasound.    Hematologic history:    # secondary erythrocytosis:  Severe erythrocytosis, hemoglobin 24, diagnosed in this generally healthy middle-aged gentleman,   about 17-pack-year smoker but with no history of cardiopulmonary problems or dyspnea.   Erythropoietin level is not suppressed; this is a point against polycythemia vera.    Bone marrow examination does not suggest polycythemia vera.    JANESSA-2 mutation negative (both JANESSA-2  V617F, as well as exons 12-15 are negative).  CALR mutation negative in bone marrow.  >> Polycythemia vera ruled out   (erythropoietin level not suppressed; JANESSA mutation negative)  Patient has some kind of secondary erythrocytosis   -Phlebotomized: 11/20/2017; 12/11/2017; 01/02/2018; 03/12/2019 (H/H 18.6/51.4); 07/15/2019 (H/H 18.0/52.2); 10/14/2019 (H/H 18.1/53.6)   -H/H: 16.2/48.8 (12/09/2019); 16.2/48.8 (11/11/2019; 16.0/48.1 (10/21/2019); 18.1/53.6 (10/08/2019), etc.   -After last therapeutic phlebotomy on 10/14/2019 (H/H 18.1/23.6), his H/H has remained stable, 16.6/49.9 as of 02/10/2020  -Therapeutic phlebotomy 605 cc on 04/21/2020 (H/H 17.7/52.2)   -07/06/2020: H/H 17.4/51.6  -Therapeutic phlebotomy 07/09/2020 (H/H 17.4/51.6), 07/20/2020 (H/H 15.3/45.3), 07/27/2020 (H/H 14.8/45.2)   -08/03/2020: H/H 14.0/42.2 (phlebotomy held)   -08/11/2020: H/H 13.9/41.4 (phlebotomy held)  -09/14/2020: CBC reviewed.  H/H 14.6/44.1.  -Since 09/15/2020: Therapeutic phlebotomy: 12/14/2020, 04/19/2021, 08/16/2021, 11/22/2021, 03/21/2022, 04/18/2022 02/19/2018:   Presents for follow-up visit.  On February 15, his hemoglobin was 13.5, stable over the last month.  White count 4000/mm³, and neutrophil count 1680/mm³, slightly low because of unclear reason.  Doing well.  Endorses no symptoms of concern.  No weakness, fatigue, headaches, vision impairment, shortness of breath, abdominal pain, nausea, vomiting, etc.    08/12/2020:   -Therapeutic phlebotomy 07/09/2020 (H/H 17.4/51.6), 07/20/2020 (H/H 15.3/45.3), 07/27/2020 (H/H 14.8/45.2)   -08/03/2020: H/H 14.0/42.2 (phlebotomy held)   -08/11/2020: CMP unremarkable.  H/H 13.9/41.4   Patient interviewed via telephone visit.  He was at his home in [place], [place].  Doing well.  Endorses no symptoms of concern except for an intermittent knot in the abdomen for which he is being evaluated for the possibility of hernia.  He has a CT scan of abdomen coming up.  No nausea or  vomiting.  No erythromelalgia.  No headaches or lethargy.  No recurrent fevers, chills, or myalgias. No epistaxis.  Takes baby aspirin daily.    09/15/2020:   -09/14/2020: CBC reviewed.  H/H 14.6/44.1.   Patient interviewed via telephone visit.  He was at work in [place], [place].  Feels well.  No symptoms of hyperviscosity state like headaches, lethargy, vision problems, epistaxis, erythromelalgia, accordingly pruritus, etc.  He quit smoking many months back.  Complains of pain in thumbs and toes.  Following up with his primary care provider.  No symptoms suggestive of erythromelalgia.  On baby aspirin daily, without GI side effects.  No indication of therapeutic phlebotomy at this time.    Interval history    09/26/2022:  -Since 09/15/2020: Therapeutic phlebotomy: 12/14/2020, 04/19/2021, 08/16/2021, 11/22/2021, 03/21/2022, 04/18/2022  -09/26/2022: Labs reviewed.  Hemoglobin 12.8.  Hematocrit 42.6.  CMP unremarkable except AST 35, minimally elevated.  Presents for a follow-up visit.  Doing well.  No complaints except for chronic swelling in both ankles.  No headaches, epistaxis, fatigue, erythromelalgia, etc..  ECOG 0.  Does not smoke anymore.            Review of Systems         12 point review of systems done in full with pertinent positives as described in interval history. Remainder of review of systems unremarkable.               Physical Examination:   VITAL SIGNS:  Reviewed.       GENERAL:  In no apparent distress.     HEAD:  No signs of head trauma.   EYES:  Pupils are equal.  Extraocular motions intact.     EARS:  Hearing grossly intact.   MOUTH:  Oropharynx is normal.    NECK:  No adenopathy, no JVD.      CHEST:  Chest with clear breath sounds bilaterally.  No wheezes, rales, or rhonchi.     CARDIAC:  Regular rate and rhythm.  S1 and S2, without murmurs, gallops, or rubs.   VASCULAR:  No Edema.  Peripheral pulses normal and equal in all extremities.   ABDOMEN:  Soft, without detectable tenderness.   No sign of distention.  No   rebound or guarding, and no masses palpated.   Bowel Sounds normal.   MUSCULOSKELETAL:  Good range of motion of all major joints. Extremities without clubbing, cyanosis or edema.     NEUROLOGIC EXAM:  Alert and oriented x 3.  No focal sensory or strength deficits.   Speech normal.  Follows commands.   PSYCHIATRIC:  Mood normal.   SKIN:  No rash or lesions.      Assessment:  # secondary erythrocytosis:  To summarize:  Secondary erythrocytosis   Presented with hemoglobin 24, completely asymptomatic  Smoking history but no dyspnea  JAK2 mutation negative.  EPO level not suppressed  Therefore, polycythemia vera ruled out  Initiated therapeutic phlebotomy treatments in view of severity of hemoglobin elevation  Empirically, target hematocrit <45      Plan:   -No evidence of polycythemia vera   -Has secondary polycythemia   -No symptoms of hyperviscosity state   -However, has behaved more or less like polycythemia vera (persistently elevated H/H), requiring periodic therapeutic phlebotomy treatments  >>>   -Therapeutic phlebotomy as needed   -Target hematocrit <45   -Hold phlebotomy if hematocrit <45   -Continue baby aspirin 81 mg p.o. daily    No need of phlebotomy today (H/H 12.8/42.6)  Follow-up in 2 months, with CBC and CMP.     -He has quit smoking    Above discussed with him.  All questions answered.    Discussed labs and gave him copies of relevant records.    He understands and agrees with this plan.   ----------------      Discussion:   He has secondary erythrocytosis of unclear etiology.     No evidence of polycythemia vera (normal erythropoietin level and negative JANESSA-2 mutation rule out polycythemia vera).   However, in terms of persistence of elevated H/H, has behaved more or less like polycythemia vera.   Had no symptoms of hyperviscosity state at any time despite severe elevation of hemoglobin.   Regardless, to be on the safer side, will continue therapeutic phlebotomy as  needed.   He is proud that he has quit smoking altogether on my advice.

## 2022-09-26 ENCOUNTER — OFFICE VISIT (OUTPATIENT)
Dept: FAMILY MEDICINE | Facility: CLINIC | Age: 42
End: 2022-09-26
Payer: MEDICAID

## 2022-09-26 ENCOUNTER — APPOINTMENT (OUTPATIENT)
Dept: HEMATOLOGY/ONCOLOGY | Facility: CLINIC | Age: 42
End: 2022-09-26
Payer: MEDICAID

## 2022-09-26 ENCOUNTER — DOCUMENTATION ONLY (OUTPATIENT)
Dept: INFUSION THERAPY | Facility: HOSPITAL | Age: 42
End: 2022-09-26
Payer: MEDICAID

## 2022-09-26 ENCOUNTER — OFFICE VISIT (OUTPATIENT)
Dept: HEMATOLOGY/ONCOLOGY | Facility: CLINIC | Age: 42
End: 2022-09-26
Payer: MEDICAID

## 2022-09-26 VITALS
WEIGHT: 164.69 LBS | HEART RATE: 89 BPM | RESPIRATION RATE: 20 BRPM | TEMPERATURE: 98 F | BODY MASS INDEX: 27.44 KG/M2 | DIASTOLIC BLOOD PRESSURE: 77 MMHG | SYSTOLIC BLOOD PRESSURE: 123 MMHG | OXYGEN SATURATION: 100 % | HEIGHT: 65 IN

## 2022-09-26 VITALS
BODY MASS INDEX: 27.32 KG/M2 | DIASTOLIC BLOOD PRESSURE: 77 MMHG | RESPIRATION RATE: 18 BRPM | WEIGHT: 164 LBS | HEIGHT: 65 IN | OXYGEN SATURATION: 100 % | SYSTOLIC BLOOD PRESSURE: 123 MMHG | HEART RATE: 89 BPM | TEMPERATURE: 98 F

## 2022-09-26 DIAGNOSIS — R60.9 DEPENDENT EDEMA: Primary | ICD-10-CM

## 2022-09-26 DIAGNOSIS — D75.1 SECONDARY POLYCYTHEMIA: Primary | ICD-10-CM

## 2022-09-26 LAB
ALBUMIN SERPL-MCNC: 3.8 GM/DL (ref 3.5–5)
ALBUMIN/GLOB SERPL: 1 RATIO (ref 1.1–2)
ALP SERPL-CCNC: 100 UNIT/L (ref 40–150)
ALT SERPL-CCNC: 29 UNIT/L (ref 0–55)
AST SERPL-CCNC: 35 UNIT/L (ref 5–34)
BILIRUBIN DIRECT+TOT PNL SERPL-MCNC: 0.4 MG/DL
BUN SERPL-MCNC: 7.5 MG/DL (ref 8.9–20.6)
CALCIUM SERPL-MCNC: 9.1 MG/DL (ref 8.4–10.2)
CHLORIDE SERPL-SCNC: 105 MMOL/L (ref 98–107)
CO2 SERPL-SCNC: 23 MMOL/L (ref 22–29)
CREAT SERPL-MCNC: 0.82 MG/DL (ref 0.73–1.18)
GFR SERPLBLD CREATININE-BSD FMLA CKD-EPI: >60 MLS/MIN/1.73/M2
GLOBULIN SER-MCNC: 4 GM/DL (ref 2.4–3.5)
GLUCOSE SERPL-MCNC: 85 MG/DL (ref 74–100)
POTASSIUM SERPL-SCNC: 4 MMOL/L (ref 3.5–5.1)
PROT SERPL-MCNC: 7.8 GM/DL (ref 6.4–8.3)
SODIUM SERPL-SCNC: 138 MMOL/L (ref 136–145)

## 2022-09-26 PROCEDURE — 3078F PR MOST RECENT DIASTOLIC BLOOD PRESSURE < 80 MM HG: ICD-10-PCS | Mod: CPTII,,, | Performed by: INTERNAL MEDICINE

## 2022-09-26 PROCEDURE — 1159F PR MEDICATION LIST DOCUMENTED IN MEDICAL RECORD: ICD-10-PCS | Mod: CPTII,,, | Performed by: NURSE PRACTITIONER

## 2022-09-26 PROCEDURE — 99213 OFFICE O/P EST LOW 20 MIN: CPT | Mod: S$PBB,,, | Performed by: NURSE PRACTITIONER

## 2022-09-26 PROCEDURE — 4010F ACE/ARB THERAPY RXD/TAKEN: CPT | Mod: CPTII,,, | Performed by: NURSE PRACTITIONER

## 2022-09-26 PROCEDURE — 3074F SYST BP LT 130 MM HG: CPT | Mod: CPTII,,, | Performed by: NURSE PRACTITIONER

## 2022-09-26 PROCEDURE — 3008F BODY MASS INDEX DOCD: CPT | Mod: CPTII,,, | Performed by: NURSE PRACTITIONER

## 2022-09-26 PROCEDURE — 99213 OFFICE O/P EST LOW 20 MIN: CPT | Mod: S$PBB,,, | Performed by: INTERNAL MEDICINE

## 2022-09-26 PROCEDURE — 3078F PR MOST RECENT DIASTOLIC BLOOD PRESSURE < 80 MM HG: ICD-10-PCS | Mod: CPTII,,, | Performed by: NURSE PRACTITIONER

## 2022-09-26 PROCEDURE — 3078F DIAST BP <80 MM HG: CPT | Mod: CPTII,,, | Performed by: INTERNAL MEDICINE

## 2022-09-26 PROCEDURE — 99214 OFFICE O/P EST MOD 30 MIN: CPT | Mod: PBBFAC,27 | Performed by: INTERNAL MEDICINE

## 2022-09-26 PROCEDURE — 4010F PR ACE/ARB THEARPY RXD/TAKEN: ICD-10-PCS | Mod: CPTII,,, | Performed by: NURSE PRACTITIONER

## 2022-09-26 PROCEDURE — 3008F PR BODY MASS INDEX (BMI) DOCUMENTED: ICD-10-PCS | Mod: CPTII,,, | Performed by: NURSE PRACTITIONER

## 2022-09-26 PROCEDURE — 3008F PR BODY MASS INDEX (BMI) DOCUMENTED: ICD-10-PCS | Mod: CPTII,,, | Performed by: INTERNAL MEDICINE

## 2022-09-26 PROCEDURE — 3074F PR MOST RECENT SYSTOLIC BLOOD PRESSURE < 130 MM HG: ICD-10-PCS | Mod: CPTII,,, | Performed by: INTERNAL MEDICINE

## 2022-09-26 PROCEDURE — 3078F DIAST BP <80 MM HG: CPT | Mod: CPTII,,, | Performed by: NURSE PRACTITIONER

## 2022-09-26 PROCEDURE — 3008F BODY MASS INDEX DOCD: CPT | Mod: CPTII,,, | Performed by: INTERNAL MEDICINE

## 2022-09-26 PROCEDURE — 3074F PR MOST RECENT SYSTOLIC BLOOD PRESSURE < 130 MM HG: ICD-10-PCS | Mod: CPTII,,, | Performed by: NURSE PRACTITIONER

## 2022-09-26 PROCEDURE — 1159F MED LIST DOCD IN RCRD: CPT | Mod: CPTII,,, | Performed by: NURSE PRACTITIONER

## 2022-09-26 PROCEDURE — 1160F RVW MEDS BY RX/DR IN RCRD: CPT | Mod: CPTII,,, | Performed by: NURSE PRACTITIONER

## 2022-09-26 PROCEDURE — 1160F PR REVIEW ALL MEDS BY PRESCRIBER/CLIN PHARMACIST DOCUMENTED: ICD-10-PCS | Mod: CPTII,,, | Performed by: NURSE PRACTITIONER

## 2022-09-26 PROCEDURE — 99213 PR OFFICE/OUTPT VISIT, EST, LEVL III, 20-29 MIN: ICD-10-PCS | Mod: S$PBB,,, | Performed by: NURSE PRACTITIONER

## 2022-09-26 PROCEDURE — 99213 PR OFFICE/OUTPT VISIT, EST, LEVL III, 20-29 MIN: ICD-10-PCS | Mod: S$PBB,,, | Performed by: INTERNAL MEDICINE

## 2022-09-26 PROCEDURE — 3074F SYST BP LT 130 MM HG: CPT | Mod: CPTII,,, | Performed by: INTERNAL MEDICINE

## 2022-09-26 PROCEDURE — 99215 OFFICE O/P EST HI 40 MIN: CPT | Mod: PBBFAC | Performed by: NURSE PRACTITIONER

## 2022-09-26 NOTE — ASSESSMENT & PLAN NOTE
Drink 8 to 10 glasses of water per day.  Discussed low-salt diet, less than 3g a day.  Elevate your feet, preferably above your heart.  Get moving.  Exercise 30 minutes a day as tolerated and up to 5 days a week.  Discussed compression socks, instructed patient to visit [facility] 1st thing in the morning to measure his calf and ankles for a better fit.  Or purchase over-the-counter from [facility] or any pharmacy, measuring tape provided, educated on how to measure, recommended no more than 20 mg mercury.  Patient verbalized.  Patient to return to clinic for a wellness with fasting blood work.

## 2022-09-26 NOTE — PROGRESS NOTES
Patient Name: Sean Dunham   : 1980  MRN: [id number]     SUBJECTIVE:  Sean Dunham is a 41 y.o. male here for Joint Swelling (VERONICA)  .    41-year-old male presents to the clinic with his significant other to establish care as well for evaluation of bilateral ankle swelling. past medical history of HTN, generalized anxiety, WALTER, GERD, b/l thumb pain, positive RF and secondary erythrocytosis followed by [facility] Heme/ Onc.    Patient state he does not have the ankle swelling today.  Patient state ankle swelling usually happens when he works for long hours standing on his feet.  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  Discussed diet, patient is not aware about salt affect on the body.  Also discussed standing for long period of time can cause dependent edema.  Patient to monitor salt intake, elevate extremities above the heart when sitting or resting, patient will get compression socks by visiting [facility] to be fitted.    Patient to return in May 2023 for a wellness with fasting blood work.  Keep follow-up appointment with Hematology and Oncology Dr. Jose Santiago.    Patient denies chest pain, shortness of breath, dyspnea on exertion, palpitations, peripheral edema, abdominal pain, nausea, vomiting, diarrhea, constipation, fatigue, fever, chills, dysuria,  hematuria, melena, or hematochezia.           Visit on 2022 Jose Santiago MD  Hematology and Oncology Secondary polycythemia    36-year-old -American man.  He was apparently tased in the month of September, and went [place] last month complaining of pain in the left upper abdominal quadrant.  They evaluated him from cardiac standpoint, and found an abnormal EKG, probably due to LVH from hypertension.  Incidentally, hemoglobin was noted to be markedly elevated, with mild thrombus cytopenia.  He also had minimally elevated troponin levels, without atypical rise and fall pattern of acute coronary syndrome.  He was evaluated by  cardiology.  His admission hemoglobin on 10/23/2017 was 24 with hematocrit of 67.  After 2 phlebotomy treatments, each 500 cc, it dropped to 22.2 g percent.  Had mild thrombus cytopenia with platelet count of 99,000.  MCV was elevated to 100.7.  In CMP, alk phos was elevated to 129, and total bilirubin 5.7.  Says that for last 6 months or so, he noticed that his eyes were bloodshot.  After 2 therapeutic phlebotomy treatments, the redness in the eyes has subsided.  Denies history of headaches, stuffiness in the head, tinnitus, nosebleeds, echogenic pruritus, anorexia, weight loss, myalgias, blood clots, or bleeding.  No history of cardiopulmonary problems or chronic dyspnea.  Erythropoietin level 6.5 on 11/15/2017, not suppressed phthisis normal range 2.6-18.5).  Of no splenomegaly noted on abdominal ultrasound.    Visit on 02/21/2022 Dr Kirkland  Pt is a 41 Years old Male with a past medical history of HTN, generalized anxiety, WALTER, GERD, b/l thumb pain, positive RF and secondary erythrocytosis followed by [facility] Heme/ Onc  Positive RF: Pt has an extensive family history of lupus- 3 sisters diagnosed with lupus.  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 Reviewed ortho  note 9/2021: Pt has joint pain to the the b/l thumbs and b/l toes.  She has an appointment scheduled with rheumatology 7/28/2022 at 10 AM.  HTN: BP this morning was 137/92.  Patient states he has not taken his blood pressure medicine this morning. Pt is currently on nifedipine extended release 60 mg p.o. daily, hydralazine 25 mg p.o. daily for blood pressure greater than 140/90, and losartan 100 mg po daily.        ALLERGIES: Review of patient's allergies indicates:  No Known Allergies      ROS:  Review of Systems   Cardiovascular:  Positive for leg swelling (bilateral ankles).   All other systems reviewed and  "are negative.      OBJECTIVE:  Vital signs  Vitals:    09/26/22 0902   BP: 123/77   Pulse: 89   Resp: 18   Temp: 98.1 °F (36.7 °C)   TempSrc: Oral   SpO2: 100%   Weight: 74.4 kg (164 lb)   Height: 5' 5" (1.651 m)      Body mass index is 27.29 kg/m².    PHYSICAL EXAM:   Physical Exam  Vitals and nursing note reviewed.   Constitutional:       General: He is not in acute distress.     Appearance: Normal appearance. He is not ill-appearing, toxic-appearing or diaphoretic.   HENT:      Head: Normocephalic and atraumatic.      Right Ear: Tympanic membrane, ear canal and external ear normal.      Left Ear: Tympanic membrane, ear canal and external ear normal.      Nose: Nose normal.      Mouth/Throat:      Mouth: Mucous membranes are moist.   Eyes:      Extraocular Movements: Extraocular movements intact.      Pupils: Pupils are equal, round, and reactive to light.   Cardiovascular:      Rate and Rhythm: Normal rate and regular rhythm.      Pulses: Normal pulses.           Dorsalis pedis pulses are 2+ on the right side and 2+ on the left side.        Posterior tibial pulses are 2+ on the right side and 2+ on the left side.      Heart sounds: Normal heart sounds.   Pulmonary:      Effort: Pulmonary effort is normal.      Breath sounds: Normal breath sounds.   Musculoskeletal:         General: No swelling, tenderness, deformity or signs of injury. Normal range of motion.      Cervical back: Normal range of motion and neck supple.      Right lower leg: No edema.      Left lower leg: No edema.   Skin:     General: Skin is warm.      Capillary Refill: Capillary refill takes less than 2 seconds.      Findings: No erythema or rash.   Neurological:      General: No focal deficit present.      Mental Status: He is alert and oriented to person, place, and time. Mental status is at baseline.   Psychiatric:         Mood and Affect: Mood normal.         Behavior: Behavior normal.         Thought Content: Thought content normal.         " Judgment: Judgment normal.        ASSESSMENT/PLAN:  1. Dependent edema  Assessment & Plan:    Drink 8 to 10 glasses of water per day.  Discussed low-salt diet, less than 3g a day.  Elevate your feet, preferably above your heart.  Get moving.  Exercise 30 minutes a day as tolerated and up to 5 days a week.  Discussed compression socks, instructed patient to visit [facility] 1st thing in the morning to measure his calf and ankles for a better fit.  Or purchase over-the-counter from [facility] or any pharmacy, measuring tape provided, educated on how to measure, recommended no more than 20 mg mercury.  Patient verbalized.  Patient to return to clinic for a wellness with fasting blood work.             RESULTS:  Recent Results (from the past 1008 hour(s))   Comprehensive Metabolic Panel    Collection Time: 08/22/22  8:58 AM   Result Value Ref Range    Sodium Level 141 136 - 145 mmol/L    Potassium Level 3.8 3.5 - 5.1 mmol/L    Chloride 107 98 - 107 mmol/L    Carbon Dioxide 22 22 - 29 mmol/L    Glucose Level 81 74 - 100 mg/dL    Blood Urea Nitrogen 5.8 (L) 8.9 - 20.6 mg/dL    Creatinine 0.75 0.73 - 1.18 mg/dL    Calcium Level Total 9.1 8.4 - 10.2 mg/dL    Protein Total 7.4 6.4 - 8.3 gm/dL    Albumin Level 3.7 3.5 - 5.0 gm/dL    Globulin 3.7 (H) 2.4 - 3.5 gm/dL    Albumin/Globulin Ratio 1.0 (L) 1.1 - 2.0 ratio    Bilirubin Total 0.3 <=1.5 mg/dL    Alkaline Phosphatase 86 40 - 150 unit/L    Alanine Aminotransferase 22 0 - 55 unit/L    Aspartate Aminotransferase 28 5 - 34 unit/L    eGFR >60 mls/min/1.73/m2   TSH    Collection Time: 08/22/22  8:58 AM   Result Value Ref Range    Thyroid Stimulating Hormone 1.5016 0.3500 - 4.9400 uIU/mL   CBC with Differential    Collection Time: 08/22/22  8:58 AM   Result Value Ref Range    WBC 5.1 4.5 - 11.5 x10(3)/mcL    RBC 5.70 4.70 - 6.10 x10(6)/mcL    Hgb 12.4 (L) 14.0 - 18.0 gm/dL    Hct 43.4 42.0 - 52.0 %    MCV 76.1 (L) 80.0 - 94.0 fL    MCH 21.8 (L) 27.0 - 31.0 pg    MCHC 28.6 (L)  33.0 - 36.0 mg/dL    RDW 18.1 (H) 11.5 - 17.0 %    Platelet 234 130 - 400 x10(3)/mcL    MPV 9.6 7.4 - 10.4 fL    Neut % 67.7 %    Lymph % 22.7 %    Mono % 8.4 %    Eos % 0.4 %    Basophil % 0.6 %    Lymph # 1.16 0.6 - 4.6 x10(3)/mcL    Neut # 3.5 2.1 - 9.2 x10(3)/mcL    Mono # 0.43 0.1 - 1.3 x10(3)/mcL    Eos # 0.02 0 - 0.9 x10(3)/mcL    Baso # 0.03 0 - 0.2 x10(3)/mcL    IG# 0.01 0 - 0.04 x10(3)/mcL    IG% 0.2 %    NRBC% 0.0 %   Comprehensive Metabolic Panel    Collection Time: 09/26/22  9:51 AM   Result Value Ref Range    Sodium Level 138 136 - 145 mmol/L    Potassium Level 4.0 3.5 - 5.1 mmol/L    Chloride 105 98 - 107 mmol/L    Carbon Dioxide 23 22 - 29 mmol/L    Glucose Level 85 74 - 100 mg/dL    Blood Urea Nitrogen 7.5 (L) 8.9 - 20.6 mg/dL    Creatinine 0.82 0.73 - 1.18 mg/dL    Calcium Level Total 9.1 8.4 - 10.2 mg/dL    Protein Total 7.8 6.4 - 8.3 gm/dL    Albumin Level 3.8 3.5 - 5.0 gm/dL    Globulin 4.0 (H) 2.4 - 3.5 gm/dL    Albumin/Globulin Ratio 1.0 (L) 1.1 - 2.0 ratio    Bilirubin Total 0.4 <=1.5 mg/dL    Alkaline Phosphatase 100 40 - 150 unit/L    Alanine Aminotransferase 29 0 - 55 unit/L    Aspartate Aminotransferase 35 (H) 5 - 34 unit/L    eGFR >60 mls/min/1.73/m2   CBC with Differential    Collection Time: 09/26/22  9:51 AM   Result Value Ref Range    WBC 8.4 4.5 - 11.5 x10(3)/mcL    RBC 5.58 4.70 - 6.10 x10(6)/mcL    Hgb 12.8 (L) 14.0 - 18.0 gm/dL    Hct 42.6 42.0 - 52.0 %    MCV 76.3 (L) 80.0 - 94.0 fL    MCH 22.9 (L) 27.0 - 31.0 pg    MCHC 30.0 (L) 33.0 - 36.0 mg/dL    RDW 19.5 (H) 11.5 - 17.0 %    Platelet 251 130 - 400 x10(3)/mcL    MPV 9.8 7.4 - 10.4 fL    Neut % 72.9 %    Lymph % 16.2 %    Mono % 9.5 %    Eos % 0.2 %    Basophil % 0.8 %    Lymph # 1.36 0.6 - 4.6 x10(3)/mcL    Neut # 6.1 2.1 - 9.2 x10(3)/mcL    Mono # 0.80 0.1 - 1.3 x10(3)/mcL    Eos # 0.02 0 - 0.9 x10(3)/mcL    Baso # 0.07 0 - 0.2 x10(3)/mcL    IG# 0.03 0 - 0.04 x10(3)/mcL    IG% 0.4 %    NRBC% 0.0 %         Follow Up:  Follow  up in about 8 months (around 5/24/2023).      Previous medical history/lab work/radiology reviewed and considered during medical management decisions.   Medication list reviewed and medication reconciliation performed.  Patient was provided  and care about his/her current diagnosis (es) and medications including risk/benefit and side effects/adverse events, over the counter medication uses/doses, home self-care and contact precautions,  and red flags and indications for when to seek immediate medical attention.   Patient was advised to continue compliance with current medication list and medical recommendations.  Patient dvised continued compliance with recommended eating habits/ diets for medical conditions and exercise 150 minutes/ week (if possible) for medical condition (s).  Educational handouts and instructions on selected disease management in AVS (After Visit Summary).    All of the patient's questions were answered to patient's satisfaction.   The patient was receptive, expressed verbal understanding and agreement the above plan.       This note was created with the assistance of a voice recognition software or phone dictation. There may be transcription errors as a result of using this technology however minimal. Effort has been made to assure accuracy of transcription but any obvious errors or omissions should be clarified with the author of the document

## 2022-11-25 PROBLEM — D75.1 SECONDARY ERYTHROCYTOSIS: Status: ACTIVE | Noted: 2022-11-25

## 2022-11-25 PROBLEM — Z72.0 TOBACCO USE: Status: ACTIVE | Noted: 2022-11-25

## 2022-11-26 NOTE — PROGRESS NOTES
History:  Past Medical History:   Diagnosis Date    Acid reflux     Anxiety     Hypertension    Past medical history:  Hypertension.  Tobacco abuse.       No past surgical history on file.   Social History     Socioeconomic History    Marital status: Single   Tobacco Use    Smoking status: Former     Types: Vaping with nicotine    Smokeless tobacco: Never   Substance and Sexual Activity    Alcohol use: Yes     Alcohol/week: 4.0 standard drinks     Types: 1 Glasses of wine, 3 Standard drinks or equivalent per week     Comment: Daily    Drug use: Never      Family History   Problem Relation Age of Onset    No Known Problems Mother     No Known Problems Father         Reason for Follow-up:  Secondary erythrocytosis    Tobacco use       History of Present Illness:   No chief complaint on file.        Oncologic/Hematologic History:  Oncology History    No history exists.   # secondary erythrocytosis:  Severe erythrocytosis, hemoglobin 24, diagnosed in this generally healthy middle-aged gentleman,   about 17-pack-year smoker but with no history of cardiopulmonary problems or dyspnea.   Erythropoietin level is not suppressed; this is a point against polycythemia vera.    Bone marrow examination does not suggest polycythemia vera.    JANESSA-2 mutation negative (both JANESSA-2 V617F, as well as exons 12-15 are negative).  CALR mutation negative in bone marrow.  >> Polycythemia vera ruled out   (erythropoietin level not suppressed; JANESSA mutation negative)  Patient has some kind of secondary erythrocytosis   -Phlebotomized: 11/20/2017; 12/11/2017; 01/02/2018; 03/12/2019 (H/H 18.6/51.4); 07/15/2019 (H/H 18.0/52.2); 10/14/2019 (H/H 18.1/53.6)   -H/H: 16.2/48.8 (12/09/2019); 16.2/48.8 (11/11/2019; 16.0/48.1 (10/21/2019); 18.1/53.6 (10/08/2019), etc.   -After last therapeutic phlebotomy on 10/14/2019 (H/H 18.1/23.6), his H/H has remained stable, 16.6/49.9 as of 02/10/2020  -Therapeutic phlebotomy 605 cc on 04/21/2020 (H/H 17.7/52.2)    -07/06/2020: H/H 17.4/51.6  -Therapeutic phlebotomy 07/09/2020 (H/H 17.4/51.6), 07/20/2020 (H/H 15.3/45.3), 07/27/2020 (H/H 14.8/45.2)   -08/03/2020: H/H 14.0/42.2 (phlebotomy held)   -08/11/2020: H/H 13.9/41.4 (phlebotomy held)  -09/14/2020: CBC reviewed.  H/H 14.6/44.1.  -Since 09/15/2020: Therapeutic phlebotomy: 12/14/2020, 04/19/2021, 08/16/2021, 11/22/2021, 03/21/2022, 04/18/2022          Interval History:  [No matching plan found]   [No matching plan found]   11/28/2022:  -11/28/2022: Labs reviewed.  Hemoglobin 13.9.  Hematocrit 45.0.  MCV 78.7.  Rest of CBC essentially unremarkable.  CMP unremarkable.  Presents for a follow-up visit.  Doing well.  Does not smoke anymore.  Denies weakness, fatigue, chest pain, dyspnea, etc..  Good appetite.  Wants to be phlebotomized today.      Medications:  Current Outpatient Medications on File Prior to Visit   Medication Sig Dispense Refill    aspirin (ECOTRIN) 81 MG EC tablet Take 1 tablet (81 mg total) by mouth once daily. 30 tablet 11    cholecalciferol, vitamin D3, 1,250 mcg (50,000 unit) capsule Take 1 capsule (50,000 Units total) by mouth once a week. 5 capsule 5    diclofenac (VOLTAREN) 50 MG EC tablet Take 1 tablet (50 mg total) by mouth 2 (two) times daily as needed (pain, after food). 60 tablet 5    diclofenac sodium (VOLTAREN) 1 % Gel Apply 2 g topically once daily. 100 g 11    hydrALAZINE (APRESOLINE) 25 MG tablet Take 1 tablet (25 mg total) by mouth once daily at 6am. 30 tablet 11    hydrOXYchloroQUINE (PLAQUENIL) 200 mg tablet Take 1 tablet (200 mg total) by mouth 2 (two) times daily. After food 60 tablet 5    hydrOXYzine pamoate (VISTARIL) 25 MG Cap Take 25 mg by mouth 3 (three) times daily.      lisinopriL-hydrochlorothiazide (PRINZIDE,ZESTORETIC) 20-25 mg Tab Take 1 tablet by mouth once daily.      losartan (COZAAR) 100 MG tablet Take 1 tablet (100 mg total) by mouth once daily. 30 tablet 11    NIFEdipine (PROCARDIA-XL) 60 MG (OSM) 24 hr tablet Take 1  tablet (60 mg total) by mouth once daily. 30 tablet 11    omeprazole (PRILOSEC) 40 MG capsule Take 1 capsule (40 mg total) by mouth once daily. Before lunch 30 capsule 11     No current facility-administered medications on file prior to visit.       Review of Systems:   All systems reviewed and found to be negative except for the symptoms detailed above    Physical Examination:   VITAL SIGNS: There were no vitals filed for this visit.  GENERAL:  In no apparent distress.    HEAD:  No signs of head trauma.  EYES:  Pupils are equal.  Extraocular motions intact.    EARS:  Hearing grossly intact.  MOUTH:  Oropharynx is normal.   NECK:  No adenopathy, no JVD.     CHEST:  Chest with clear breath sounds bilaterally.  No wheezes, rales, rhonchi.    CARDIAC:  Regular rate and rhythm.  S1 and S2, without murmurs, gallops, rubs.  VASCULAR:  No Edema.  Peripheral pulses normal and equal in all extremities.  ABDOMEN:  Soft, without detectable tenderness.  No sign of distention.  No   rebound or guarding, and no masses palpated.   Bowel Sounds normal.  MUSCULOSKELETAL:  Good range of motion of all major joints. Extremities without clubbing, cyanosis or edema.    NEUROLOGIC EXAM:  Alert and oriented x 3.  No focal sensory or strength deficits.   Speech normal.  Follows commands.  PSYCHIATRIC:  Mood normal.    No results for input(s): CBC in the last 72 hours.   No results for input(s): CMP in the last 72 hours.     Assessment:  Problem List Items Addressed This Visit    None  # secondary erythrocytosis:  To summarize:  Secondary erythrocytosis   Presented with hemoglobin 24, completely asymptomatic  Smoking history but no dyspnea  JAK2 mutation negative.  EPO level not suppressed  >>Therefore, polycythemia vera ruled out  However, has behaved more or less like polycythemia vera (persistently elevated X/H), requiring periodic therapeutic phlebotomy treatments  Initiated therapeutic phlebotomy treatments in view of severity of  hemoglobin elevation  Empirically, target hematocrit <45        Plan:   -No evidence of polycythemia vera   -Has secondary polycythemia   -No symptoms of hyperviscosity state   -However, has behaved more or less like polycythemia vera (persistently elevated H/H), requiring periodic therapeutic phlebotomy treatments  >>>   -Therapeutic phlebotomy as needed   -Target hematocrit <45   -Hold phlebotomy if hematocrit <45   -Continue baby aspirin 81 mg p.o. daily  >>>  -11/28/2022: H/H 13.9/45.0  Per his preference, phlebotomize 1 unit of blood today   Follow-up in 2 months, with CBC and CMP  Continue baby aspirin 81 mg daily      -He has quit smoking    Follow-up in 2 months, with CBC and CMP     Above discussed with him.  All questions answered.    Discussed labs and gave him copies of relevant records.    He understands and agrees with this plan.   ----------------        Discussion:   He has secondary erythrocytosis of unclear etiology.     No evidence of polycythemia vera (normal erythropoietin level and negative JANESSA-2 mutation rule out polycythemia vera).   However, in terms of persistence of elevated H/H, has behaved more or less like polycythemia vera.   Had no symptoms of hyperviscosity state at any time despite severe elevation of hemoglobin.   Regardless, to be on the safer side, will continue therapeutic phlebotomy as needed.   He is proud that he has quit smoking altogether on my advice.       Follow-up:  No follow-ups on file.

## 2022-11-28 ENCOUNTER — INFUSION (OUTPATIENT)
Dept: INFUSION THERAPY | Facility: HOSPITAL | Age: 42
End: 2022-11-28
Attending: INTERNAL MEDICINE
Payer: MEDICAID

## 2022-11-28 ENCOUNTER — OFFICE VISIT (OUTPATIENT)
Dept: HEMATOLOGY/ONCOLOGY | Facility: CLINIC | Age: 42
End: 2022-11-28
Payer: MEDICAID

## 2022-11-28 VITALS
WEIGHT: 160.5 LBS | OXYGEN SATURATION: 100 % | TEMPERATURE: 98 F | BODY MASS INDEX: 26.74 KG/M2 | DIASTOLIC BLOOD PRESSURE: 86 MMHG | RESPIRATION RATE: 20 BRPM | SYSTOLIC BLOOD PRESSURE: 130 MMHG | HEART RATE: 84 BPM | HEIGHT: 65 IN

## 2022-11-28 DIAGNOSIS — D75.1 SECONDARY ERYTHROCYTOSIS: Primary | ICD-10-CM

## 2022-11-28 DIAGNOSIS — D75.1 SECONDARY ERYTHROCYTOSIS: ICD-10-CM

## 2022-11-28 DIAGNOSIS — Z72.0 TOBACCO USE: ICD-10-CM

## 2022-11-28 PROCEDURE — 99195 PHLEBOTOMY: CPT

## 2022-11-28 PROCEDURE — 3075F PR MOST RECENT SYSTOLIC BLOOD PRESS GE 130-139MM HG: ICD-10-PCS | Mod: CPTII,,, | Performed by: INTERNAL MEDICINE

## 2022-11-28 PROCEDURE — 1160F RVW MEDS BY RX/DR IN RCRD: CPT | Mod: CPTII,,, | Performed by: INTERNAL MEDICINE

## 2022-11-28 PROCEDURE — 99213 PR OFFICE/OUTPT VISIT, EST, LEVL III, 20-29 MIN: ICD-10-PCS | Mod: S$PBB,,, | Performed by: INTERNAL MEDICINE

## 2022-11-28 PROCEDURE — 4010F PR ACE/ARB THEARPY RXD/TAKEN: ICD-10-PCS | Mod: CPTII,,, | Performed by: INTERNAL MEDICINE

## 2022-11-28 PROCEDURE — 3079F PR MOST RECENT DIASTOLIC BLOOD PRESSURE 80-89 MM HG: ICD-10-PCS | Mod: CPTII,,, | Performed by: INTERNAL MEDICINE

## 2022-11-28 PROCEDURE — 99214 OFFICE O/P EST MOD 30 MIN: CPT | Mod: PBBFAC | Performed by: INTERNAL MEDICINE

## 2022-11-28 PROCEDURE — 1160F PR REVIEW ALL MEDS BY PRESCRIBER/CLIN PHARMACIST DOCUMENTED: ICD-10-PCS | Mod: CPTII,,, | Performed by: INTERNAL MEDICINE

## 2022-11-28 PROCEDURE — 1159F PR MEDICATION LIST DOCUMENTED IN MEDICAL RECORD: ICD-10-PCS | Mod: CPTII,,, | Performed by: INTERNAL MEDICINE

## 2022-11-28 PROCEDURE — 1159F MED LIST DOCD IN RCRD: CPT | Mod: CPTII,,, | Performed by: INTERNAL MEDICINE

## 2022-11-28 PROCEDURE — 3079F DIAST BP 80-89 MM HG: CPT | Mod: CPTII,,, | Performed by: INTERNAL MEDICINE

## 2022-11-28 PROCEDURE — 3008F BODY MASS INDEX DOCD: CPT | Mod: CPTII,,, | Performed by: INTERNAL MEDICINE

## 2022-11-28 PROCEDURE — 3075F SYST BP GE 130 - 139MM HG: CPT | Mod: CPTII,,, | Performed by: INTERNAL MEDICINE

## 2022-11-28 PROCEDURE — 3008F PR BODY MASS INDEX (BMI) DOCUMENTED: ICD-10-PCS | Mod: CPTII,,, | Performed by: INTERNAL MEDICINE

## 2022-11-28 PROCEDURE — 4010F ACE/ARB THERAPY RXD/TAKEN: CPT | Mod: CPTII,,, | Performed by: INTERNAL MEDICINE

## 2022-11-28 PROCEDURE — 99213 OFFICE O/P EST LOW 20 MIN: CPT | Mod: S$PBB,,, | Performed by: INTERNAL MEDICINE

## 2022-11-28 RX ORDER — LIDOCAINE HYDROCHLORIDE 10 MG/ML
1 INJECTION, SOLUTION EPIDURAL; INFILTRATION; INTRACAUDAL; PERINEURAL ONCE
Status: DISCONTINUED | OUTPATIENT
Start: 2022-11-28 | End: 2022-11-28

## 2022-11-28 RX ORDER — LIDOCAINE HYDROCHLORIDE 10 MG/ML
1 INJECTION, SOLUTION EPIDURAL; INFILTRATION; INTRACAUDAL; PERINEURAL ONCE
Status: CANCELLED | OUTPATIENT
Start: 2022-12-05 | End: 2022-12-05

## 2022-11-28 RX ORDER — LIDOCAINE HYDROCHLORIDE 10 MG/ML
1 INJECTION, SOLUTION EPIDURAL; INFILTRATION; INTRACAUDAL; PERINEURAL ONCE
Status: CANCELLED | OUTPATIENT
Start: 2022-11-28 | End: 2022-11-28

## 2023-01-03 ENCOUNTER — APPOINTMENT (OUTPATIENT)
Dept: HEMATOLOGY/ONCOLOGY | Facility: CLINIC | Age: 43
End: 2023-01-03
Payer: MEDICAID

## 2023-01-03 DIAGNOSIS — D75.1 ERYTHROCYTOSIS: ICD-10-CM

## 2023-01-03 LAB
ALBUMIN SERPL-MCNC: 3.8 G/DL (ref 3.5–5)
ALBUMIN/GLOB SERPL: 1 RATIO (ref 1.1–2)
ALP SERPL-CCNC: 95 UNIT/L (ref 40–150)
ALT SERPL-CCNC: 21 UNIT/L (ref 0–55)
AST SERPL-CCNC: 30 UNIT/L (ref 5–34)
BASOPHILS # BLD AUTO: 0.06 X10(3)/MCL (ref 0–0.2)
BASOPHILS NFR BLD AUTO: 1.3 %
BILIRUBIN DIRECT+TOT PNL SERPL-MCNC: 0.3 MG/DL
BUN SERPL-MCNC: 6.2 MG/DL (ref 8.9–20.6)
CALCIUM SERPL-MCNC: 9.3 MG/DL (ref 8.4–10.2)
CHLORIDE SERPL-SCNC: 109 MMOL/L (ref 98–107)
CO2 SERPL-SCNC: 25 MMOL/L (ref 22–29)
CREAT SERPL-MCNC: 0.93 MG/DL (ref 0.73–1.18)
EOSINOPHIL # BLD AUTO: 0.03 X10(3)/MCL (ref 0–0.9)
EOSINOPHIL NFR BLD AUTO: 0.7 %
ERYTHROCYTE [DISTWIDTH] IN BLOOD BY AUTOMATED COUNT: 17.4 % (ref 11.6–14.4)
GFR SERPLBLD CREATININE-BSD FMLA CKD-EPI: >90 MLS/MIN/1.73/M2
GLOBULIN SER-MCNC: 3.9 GM/DL (ref 2.4–3.5)
GLUCOSE SERPL-MCNC: 86 MG/DL (ref 74–100)
HCT VFR BLD AUTO: 43.7 % (ref 42–52)
HGB BLD-MCNC: 13.1 GM/DL (ref 14–18)
IMM GRANULOCYTES # BLD AUTO: 0.01 X10(3)/MCL (ref 0–0.04)
IMM GRANULOCYTES NFR BLD AUTO: 0.2 %
LYMPHOCYTES # BLD AUTO: 1.21 X10(3)/MCL (ref 0.6–4.6)
LYMPHOCYTES NFR BLD AUTO: 26.5 %
MCH RBC QN AUTO: 24.4 PG
MCHC RBC AUTO-ENTMCNC: 30 MG/DL (ref 33–36)
MCV RBC AUTO: 81.4 FL (ref 80–94)
MONOCYTES # BLD AUTO: 0.43 X10(3)/MCL (ref 0.1–1.3)
MONOCYTES NFR BLD AUTO: 9.4 %
NEUTROPHILS # BLD AUTO: 2.83 X10(3)/MCL (ref 2.1–9.2)
NEUTROPHILS NFR BLD AUTO: 61.9 %
NRBC BLD AUTO-RTO: 0 % (ref 0–1)
PLATELET # BLD AUTO: 247 X10(3)/MCL (ref 140–371)
PMV BLD AUTO: 10.1 FL (ref 9.4–12.4)
POTASSIUM SERPL-SCNC: 4.3 MMOL/L (ref 3.5–5.1)
PROT SERPL-MCNC: 7.7 GM/DL (ref 6.4–8.3)
RBC # BLD AUTO: 5.37 X10(6)/MCL (ref 4.7–6.1)
SODIUM SERPL-SCNC: 142 MMOL/L (ref 136–145)
WBC # SPEC AUTO: 4.6 X10(3)/MCL (ref 4.5–11.5)

## 2023-01-03 PROCEDURE — 36415 COLL VENOUS BLD VENIPUNCTURE: CPT

## 2023-01-03 PROCEDURE — 80053 COMPREHEN METABOLIC PANEL: CPT

## 2023-01-03 PROCEDURE — 85025 COMPLETE CBC W/AUTO DIFF WBC: CPT

## 2023-01-30 ENCOUNTER — DOCUMENTATION ONLY (OUTPATIENT)
Dept: INFUSION THERAPY | Facility: HOSPITAL | Age: 43
End: 2023-01-30
Payer: MEDICAID

## 2023-01-30 ENCOUNTER — OFFICE VISIT (OUTPATIENT)
Dept: HEMATOLOGY/ONCOLOGY | Facility: CLINIC | Age: 43
End: 2023-01-30
Payer: MEDICAID

## 2023-01-30 VITALS
OXYGEN SATURATION: 100 % | WEIGHT: 166 LBS | SYSTOLIC BLOOD PRESSURE: 131 MMHG | BODY MASS INDEX: 27.66 KG/M2 | TEMPERATURE: 98 F | HEART RATE: 86 BPM | DIASTOLIC BLOOD PRESSURE: 81 MMHG | HEIGHT: 65 IN | RESPIRATION RATE: 20 BRPM

## 2023-01-30 DIAGNOSIS — D75.1 SECONDARY POLYCYTHEMIA: Primary | ICD-10-CM

## 2023-01-30 DIAGNOSIS — D75.1 SECONDARY ERYTHROCYTOSIS: ICD-10-CM

## 2023-01-30 DIAGNOSIS — D75.1 SECONDARY ERYTHROCYTOSIS: Primary | ICD-10-CM

## 2023-01-30 DIAGNOSIS — Z72.0 TOBACCO USE: ICD-10-CM

## 2023-01-30 PROCEDURE — 1160F PR REVIEW ALL MEDS BY PRESCRIBER/CLIN PHARMACIST DOCUMENTED: ICD-10-PCS | Mod: CPTII,,, | Performed by: INTERNAL MEDICINE

## 2023-01-30 PROCEDURE — 1159F PR MEDICATION LIST DOCUMENTED IN MEDICAL RECORD: ICD-10-PCS | Mod: CPTII,,, | Performed by: INTERNAL MEDICINE

## 2023-01-30 PROCEDURE — 4010F PR ACE/ARB THEARPY RXD/TAKEN: ICD-10-PCS | Mod: CPTII,,, | Performed by: INTERNAL MEDICINE

## 2023-01-30 PROCEDURE — 3008F PR BODY MASS INDEX (BMI) DOCUMENTED: ICD-10-PCS | Mod: CPTII,,, | Performed by: INTERNAL MEDICINE

## 2023-01-30 PROCEDURE — 3008F BODY MASS INDEX DOCD: CPT | Mod: CPTII,,, | Performed by: INTERNAL MEDICINE

## 2023-01-30 PROCEDURE — 3079F DIAST BP 80-89 MM HG: CPT | Mod: CPTII,,, | Performed by: INTERNAL MEDICINE

## 2023-01-30 PROCEDURE — 99214 OFFICE O/P EST MOD 30 MIN: CPT | Mod: PBBFAC | Performed by: INTERNAL MEDICINE

## 2023-01-30 PROCEDURE — 1159F MED LIST DOCD IN RCRD: CPT | Mod: CPTII,,, | Performed by: INTERNAL MEDICINE

## 2023-01-30 PROCEDURE — 99213 OFFICE O/P EST LOW 20 MIN: CPT | Mod: S$PBB,,, | Performed by: INTERNAL MEDICINE

## 2023-01-30 PROCEDURE — 4010F ACE/ARB THERAPY RXD/TAKEN: CPT | Mod: CPTII,,, | Performed by: INTERNAL MEDICINE

## 2023-01-30 PROCEDURE — 3079F PR MOST RECENT DIASTOLIC BLOOD PRESSURE 80-89 MM HG: ICD-10-PCS | Mod: CPTII,,, | Performed by: INTERNAL MEDICINE

## 2023-01-30 PROCEDURE — 3075F PR MOST RECENT SYSTOLIC BLOOD PRESS GE 130-139MM HG: ICD-10-PCS | Mod: CPTII,,, | Performed by: INTERNAL MEDICINE

## 2023-01-30 PROCEDURE — 99213 PR OFFICE/OUTPT VISIT, EST, LEVL III, 20-29 MIN: ICD-10-PCS | Mod: S$PBB,,, | Performed by: INTERNAL MEDICINE

## 2023-01-30 PROCEDURE — 3075F SYST BP GE 130 - 139MM HG: CPT | Mod: CPTII,,, | Performed by: INTERNAL MEDICINE

## 2023-01-30 PROCEDURE — 1160F RVW MEDS BY RX/DR IN RCRD: CPT | Mod: CPTII,,, | Performed by: INTERNAL MEDICINE

## 2023-01-30 RX ORDER — MUPIROCIN 20 MG/G
OINTMENT TOPICAL
COMMUNITY
Start: 2022-09-27

## 2023-01-30 RX ORDER — MELOXICAM 7.5 MG/1
7.5 TABLET ORAL 2 TIMES DAILY
COMMUNITY
Start: 2023-01-26 | End: 2023-03-20

## 2023-01-30 RX ORDER — NIFEDIPINE 60 MG/1
60 TABLET, EXTENDED RELEASE ORAL DAILY
COMMUNITY
Start: 2022-11-25 | End: 2023-05-24

## 2023-01-30 NOTE — PROGRESS NOTES
Patient arrived for office visit, labs. HCT under 45 (41.5) and did not require therapeutic phlebotomy per Dr Pepe.

## 2023-01-30 NOTE — PROGRESS NOTES
History:  Past Medical History:   Diagnosis Date    Acid reflux     Anxiety     Hypertension    Past medical history:  Hypertension.  Tobacco abuse.       History reviewed. No pertinent surgical history.   Social History     Socioeconomic History    Marital status: Single   Tobacco Use    Smoking status: Former     Types: Vaping with nicotine    Smokeless tobacco: Never   Substance and Sexual Activity    Alcohol use: Yes     Alcohol/week: 4.0 standard drinks     Types: 1 Glasses of wine, 3 Standard drinks or equivalent per week     Comment: Daily    Drug use: Never      Family History   Problem Relation Age of Onset    No Known Problems Mother     No Known Problems Father       Reason for Follow-up:  Secondary erythrocytosis    Tobacco use     History of Present Illness:   Results    Oncologic/Hematologic History:  Oncology History    No history exists.   # secondary erythrocytosis:  Severe erythrocytosis, hemoglobin 24, diagnosed in this generally healthy middle-aged gentleman,   about 17-pack-year smoker but with no history of cardiopulmonary problems or dyspnea.   Erythropoietin level is not suppressed; this is a point against polycythemia vera.    Bone marrow examination does not suggest polycythemia vera.    JANESSA-2 mutation negative (both JANESSA-2 V617F, as well as exons 12-15 are negative).  CALR mutation negative in bone marrow.  >> Polycythemia vera ruled out   (erythropoietin level not suppressed; JANESSA mutation negative)  Patient has some kind of secondary erythrocytosis   -Phlebotomized: 11/20/2017; 12/11/2017; 01/02/2018; 03/12/2019 (H/H 18.6/51.4); 07/15/2019 (H/H 18.0/52.2); 10/14/2019 (H/H 18.1/53.6)   -H/H: 16.2/48.8 (12/09/2019); 16.2/48.8 (11/11/2019; 16.0/48.1 (10/21/2019); 18.1/53.6 (10/08/2019), etc.   -After last therapeutic phlebotomy on 10/14/2019 (H/H 18.1/23.6), his H/H has remained stable, 16.6/49.9 as of 02/10/2020  -Therapeutic phlebotomy 605 cc on 04/21/2020 (H/H 17.7/52.2)   -07/06/2020: H/H  17.4/51.6  -Therapeutic phlebotomy 07/09/2020 (H/H 17.4/51.6), 07/20/2020 (H/H 15.3/45.3), 07/27/2020 (H/H 14.8/45.2)   -08/03/2020: H/H 14.0/42.2 (phlebotomy held)   -08/11/2020: H/H 13.9/41.4 (phlebotomy held)  -09/14/2020: CBC reviewed.  H/H 14.6/44.1.  -Since 09/15/2020: Therapeutic phlebotomy: 12/14/2020, 04/19/2021, 08/16/2021, 11/22/2021, 03/21/2022, 04/18/2022        -11/28/2022:  H/H 13.9/45.0   -S/P therapeutic phlebotomy 11/28/2022, per patient preference (534 mL phlebotomized)  -01/03/2023:  H/H 13.1/43.7    Interval History:  THERAPEUTIC PHLEBOTOMY   [No matching plan found]   01/30/2023:  -11/28/2022:  H/H 13.9/45.0   -S/P therapeutic phlebotomy 11/28/2022, per patient preference (534 mL phlebotomized)  -01/03/2023:  H/H 13.1/43.7  -01/30/2023: Labs reviewed.  Hemoglobin 13.0.  Hematocrit 41.5.  Rest of CBC also unremarkable.  CMP reviewed.  Presents for a follow-up visit.  Doing well.  No complaints.  No weakness, fatigue, malaise, headaches, epistaxis, vision problems, dyspnea, etc..  ECOG 0.      Medications:  Current Outpatient Medications on File Prior to Visit   Medication Sig Dispense Refill    aspirin (ECOTRIN) 81 MG EC tablet Take 1 tablet (81 mg total) by mouth once daily. 30 tablet 11    cholecalciferol, vitamin D3, 1,250 mcg (50,000 unit) capsule Take 1 capsule (50,000 Units total) by mouth once a week. 5 capsule 5    diclofenac (VOLTAREN) 50 MG EC tablet Take 1 tablet (50 mg total) by mouth 2 (two) times daily as needed (pain, after food). 60 tablet 5    diclofenac sodium (VOLTAREN) 1 % Gel Apply 2 g topically once daily. 100 g 11    hydrALAZINE (APRESOLINE) 25 MG tablet Take 1 tablet (25 mg total) by mouth once daily at 6am. 30 tablet 11    hydrOXYzine pamoate (VISTARIL) 25 MG Cap Take 25 mg by mouth 3 (three) times daily.      lisinopriL-hydrochlorothiazide (PRINZIDE,ZESTORETIC) 20-25 mg Tab Take 1 tablet by mouth once daily.      losartan (COZAAR) 100 MG tablet Take 1 tablet (100 mg  total) by mouth once daily. 30 tablet 11    meloxicam (MOBIC) 7.5 MG tablet Take 7.5 mg by mouth 2 (two) times daily.      mupirocin (BACTROBAN) 2 % ointment SMARTSI Application Topical 2-3 Times Daily      NIFEdipine (ADALAT CC) 60 MG TbSR Take 60 mg by mouth.      NIFEdipine (PROCARDIA-XL) 60 MG (OSM) 24 hr tablet Take 1 tablet (60 mg total) by mouth once daily. 30 tablet 11    omeprazole (PRILOSEC) 40 MG capsule Take 1 capsule (40 mg total) by mouth once daily. Before lunch 30 capsule 11     No current facility-administered medications on file prior to visit.       Review of Systems:   All systems reviewed and found to be negative except for the symptoms detailed above    Physical Examination:   VITAL SIGNS:   Vitals:    23 1112   BP: 131/81   Pulse: 86   Resp: 20   Temp: 98.4 °F (36.9 °C)     GENERAL:  In no apparent distress.    HEAD:  No signs of head trauma.  EYES:  Pupils are equal.  Extraocular motions intact.    EARS:  Hearing grossly intact.  MOUTH:  Oropharynx is normal.   NECK:  No adenopathy, no JVD.     CHEST:  Chest with clear breath sounds bilaterally.  No wheezes, rales, rhonchi.    CARDIAC:  Regular rate and rhythm.  S1 and S2, without murmurs, gallops, rubs.  VASCULAR:  No Edema.  Peripheral pulses normal and equal in all extremities.  ABDOMEN:  Soft, without detectable tenderness.  No sign of distention.  No   rebound or guarding, and no masses palpated.   Bowel Sounds normal.  MUSCULOSKELETAL:  Good range of motion of all major joints. Extremities without clubbing, cyanosis or edema.    NEUROLOGIC EXAM:  Alert and oriented x 3.  No focal sensory or strength deficits.   Speech normal.  Follows commands.  PSYCHIATRIC:  Mood normal.    No results for input(s): CBC in the last 72 hours.   No results for input(s): CMP in the last 72 hours.     Assessment:  Problem List Items Addressed This Visit          Oncology    Secondary erythrocytosis - Primary       Other    Tobacco use   # secondary  erythrocytosis:  To summarize:  Secondary erythrocytosis   Presented with hemoglobin 24, completely asymptomatic  Smoking history but no dyspnea  JAK2 mutation negative.  EPO level not suppressed  >>Therefore, polycythemia vera ruled out  However, has behaved more or less like polycythemia vera (persistently elevated H/H), requiring periodic therapeutic phlebotomy treatments  Initiated therapeutic phlebotomy treatments in view of severity of hemoglobin elevation  Empirically, target hematocrit <45  -01/30/2023:  H/H 13.0/41.5        Plan:   -No evidence of polycythemia vera   -Has secondary polycythemia   -No symptoms of hyperviscosity state   -However, has behaved more or less like polycythemia vera (persistently elevated H/H), requiring periodic therapeutic phlebotomy treatments  -01/30/2023:  H/H 13.0/41.5  >>>   -Therapeutic phlebotomy as needed  -no indication of therapeutic phlebotomy at this time   -Target hematocrit <45   -Hold phlebotomy if hematocrit <45   -Continue baby aspirin 81 mg p.o. daily      -He has quit smoking    Follow-up in 3 months, with CBC and CMP     Above discussed with him.  All questions answered.    Discussed labs and gave him copies of relevant records.    He understands and agrees with this plan.   ----------------        Discussion:   He has secondary erythrocytosis of unclear etiology.     No evidence of polycythemia vera (normal erythropoietin level and negative JANESSA-2 mutation rule out polycythemia vera).   However, in terms of persistence of elevated H/H, has behaved more or less like polycythemia vera.   Had no symptoms of hyperviscosity state at any time despite severe elevation of hemoglobin.   Regardless, to be on the safer side, will continue therapeutic phlebotomy as needed.   He is proud that he has quit smoking altogether on my advice.       Follow-up:  No follow-ups on file.

## 2023-02-27 ENCOUNTER — CLINICAL SUPPORT (OUTPATIENT)
Dept: HEMATOLOGY/ONCOLOGY | Facility: CLINIC | Age: 43
End: 2023-02-27
Payer: MEDICAID

## 2023-02-27 DIAGNOSIS — D75.1 SECONDARY POLYCYTHEMIA: ICD-10-CM

## 2023-02-27 DIAGNOSIS — D75.1 SECONDARY ERYTHROCYTOSIS: ICD-10-CM

## 2023-02-27 LAB
ALBUMIN SERPL-MCNC: 3.6 G/DL (ref 3.5–5)
ALBUMIN/GLOB SERPL: 0.9 RATIO (ref 1.1–2)
ALP SERPL-CCNC: 94 UNIT/L (ref 40–150)
ALT SERPL-CCNC: 32 UNIT/L (ref 0–55)
AST SERPL-CCNC: 36 UNIT/L (ref 5–34)
BASOPHILS # BLD AUTO: 0.05 X10(3)/MCL (ref 0–0.2)
BASOPHILS NFR BLD AUTO: 1.2 %
BILIRUBIN DIRECT+TOT PNL SERPL-MCNC: 0.2 MG/DL
BUN SERPL-MCNC: 7.8 MG/DL (ref 8.9–20.6)
CALCIUM SERPL-MCNC: 8.9 MG/DL (ref 8.4–10.2)
CHLORIDE SERPL-SCNC: 108 MMOL/L (ref 98–107)
CO2 SERPL-SCNC: 24 MMOL/L (ref 22–29)
CREAT SERPL-MCNC: 0.85 MG/DL (ref 0.73–1.18)
EOSINOPHIL # BLD AUTO: 0.04 X10(3)/MCL (ref 0–0.9)
EOSINOPHIL NFR BLD AUTO: 0.9 %
ERYTHROCYTE [DISTWIDTH] IN BLOOD BY AUTOMATED COUNT: 15.8 % (ref 11.5–17)
GFR SERPLBLD CREATININE-BSD FMLA CKD-EPI: >60 MLS/MIN/1.73/M2
GLOBULIN SER-MCNC: 3.8 GM/DL (ref 2.4–3.5)
GLUCOSE SERPL-MCNC: 103 MG/DL (ref 74–100)
HCT VFR BLD AUTO: 41.5 % (ref 42–52)
HGB BLD-MCNC: 13.2 G/DL (ref 14–18)
IMM GRANULOCYTES # BLD AUTO: 0.02 X10(3)/MCL (ref 0–0.04)
IMM GRANULOCYTES NFR BLD AUTO: 0.5 %
LYMPHOCYTES # BLD AUTO: 1.33 X10(3)/MCL (ref 0.6–4.6)
LYMPHOCYTES NFR BLD AUTO: 31.1 %
MCH RBC QN AUTO: 25.4 PG
MCHC RBC AUTO-ENTMCNC: 31.8 G/DL (ref 33–36)
MCV RBC AUTO: 80 FL (ref 80–94)
MONOCYTES # BLD AUTO: 0.51 X10(3)/MCL (ref 0.1–1.3)
MONOCYTES NFR BLD AUTO: 11.9 %
NEUTROPHILS # BLD AUTO: 2.33 X10(3)/MCL (ref 2.1–9.2)
NEUTROPHILS NFR BLD AUTO: 54.4 %
NRBC BLD AUTO-RTO: 0 %
PLATELET # BLD AUTO: 240 X10(3)/MCL (ref 130–400)
PMV BLD AUTO: 10.5 FL (ref 7.4–10.4)
POTASSIUM SERPL-SCNC: 3.9 MMOL/L (ref 3.5–5.1)
PROT SERPL-MCNC: 7.4 GM/DL (ref 6.4–8.3)
RBC # BLD AUTO: 5.19 X10(6)/MCL (ref 4.7–6.1)
SODIUM SERPL-SCNC: 140 MMOL/L (ref 136–145)
WBC # SPEC AUTO: 4.3 X10(3)/MCL (ref 4.5–11.5)

## 2023-02-27 PROCEDURE — 36415 COLL VENOUS BLD VENIPUNCTURE: CPT

## 2023-02-27 PROCEDURE — 80053 COMPREHEN METABOLIC PANEL: CPT

## 2023-02-27 PROCEDURE — 85025 COMPLETE CBC W/AUTO DIFF WBC: CPT

## 2023-03-20 ENCOUNTER — OFFICE VISIT (OUTPATIENT)
Dept: RHEUMATOLOGY | Facility: CLINIC | Age: 43
End: 2023-03-20
Payer: MEDICAID

## 2023-03-20 VITALS
DIASTOLIC BLOOD PRESSURE: 68 MMHG | TEMPERATURE: 98 F | RESPIRATION RATE: 18 BRPM | SYSTOLIC BLOOD PRESSURE: 120 MMHG | OXYGEN SATURATION: 98 % | WEIGHT: 165.19 LBS | HEIGHT: 65 IN | HEART RATE: 86 BPM | BODY MASS INDEX: 27.52 KG/M2

## 2023-03-20 DIAGNOSIS — F41.1 GENERALIZED ANXIETY DISORDER: ICD-10-CM

## 2023-03-20 DIAGNOSIS — M05.9 SEROPOSITIVE RHEUMATOID ARTHRITIS: Primary | ICD-10-CM

## 2023-03-20 DIAGNOSIS — K21.9 GASTROESOPHAGEAL REFLUX DISEASE WITHOUT ESOPHAGITIS: ICD-10-CM

## 2023-03-20 DIAGNOSIS — M79.7 FIBROMYALGIA SYNDROME: ICD-10-CM

## 2023-03-20 DIAGNOSIS — F41.1 GAD (GENERALIZED ANXIETY DISORDER): ICD-10-CM

## 2023-03-20 DIAGNOSIS — E55.9 VITAMIN D DEFICIENCY: ICD-10-CM

## 2023-03-20 PROCEDURE — 1159F MED LIST DOCD IN RCRD: CPT | Mod: CPTII,,, | Performed by: INTERNAL MEDICINE

## 2023-03-20 PROCEDURE — 99214 OFFICE O/P EST MOD 30 MIN: CPT | Mod: S$PBB,,, | Performed by: INTERNAL MEDICINE

## 2023-03-20 PROCEDURE — 3078F DIAST BP <80 MM HG: CPT | Mod: CPTII,,, | Performed by: INTERNAL MEDICINE

## 2023-03-20 PROCEDURE — 3078F PR MOST RECENT DIASTOLIC BLOOD PRESSURE < 80 MM HG: ICD-10-PCS | Mod: CPTII,,, | Performed by: INTERNAL MEDICINE

## 2023-03-20 PROCEDURE — 3008F BODY MASS INDEX DOCD: CPT | Mod: CPTII,,, | Performed by: INTERNAL MEDICINE

## 2023-03-20 PROCEDURE — 99999 PR PBB SHADOW E&M-EST. PATIENT-LVL III: CPT | Mod: PBBFAC,,, | Performed by: INTERNAL MEDICINE

## 2023-03-20 PROCEDURE — 3074F SYST BP LT 130 MM HG: CPT | Mod: CPTII,,, | Performed by: INTERNAL MEDICINE

## 2023-03-20 PROCEDURE — 4010F ACE/ARB THERAPY RXD/TAKEN: CPT | Mod: CPTII,,, | Performed by: INTERNAL MEDICINE

## 2023-03-20 PROCEDURE — 3008F PR BODY MASS INDEX (BMI) DOCUMENTED: ICD-10-PCS | Mod: CPTII,,, | Performed by: INTERNAL MEDICINE

## 2023-03-20 PROCEDURE — 4010F PR ACE/ARB THEARPY RXD/TAKEN: ICD-10-PCS | Mod: CPTII,,, | Performed by: INTERNAL MEDICINE

## 2023-03-20 PROCEDURE — 99999 PR PBB SHADOW E&M-EST. PATIENT-LVL III: ICD-10-PCS | Mod: PBBFAC,,, | Performed by: INTERNAL MEDICINE

## 2023-03-20 PROCEDURE — 3074F PR MOST RECENT SYSTOLIC BLOOD PRESSURE < 130 MM HG: ICD-10-PCS | Mod: CPTII,,, | Performed by: INTERNAL MEDICINE

## 2023-03-20 PROCEDURE — 99213 OFFICE O/P EST LOW 20 MIN: CPT | Mod: PBBFAC | Performed by: INTERNAL MEDICINE

## 2023-03-20 PROCEDURE — 1159F PR MEDICATION LIST DOCUMENTED IN MEDICAL RECORD: ICD-10-PCS | Mod: CPTII,,, | Performed by: INTERNAL MEDICINE

## 2023-03-20 PROCEDURE — 99214 PR OFFICE/OUTPT VISIT, EST, LEVL IV, 30-39 MIN: ICD-10-PCS | Mod: S$PBB,,, | Performed by: INTERNAL MEDICINE

## 2023-03-20 RX ORDER — HYDROXYCHLOROQUINE SULFATE 200 MG/1
200 TABLET, FILM COATED ORAL 2 TIMES DAILY
Qty: 60 TABLET | Refills: 5 | Status: SHIPPED | OUTPATIENT
Start: 2023-03-20 | End: 2023-07-24 | Stop reason: SDUPTHER

## 2023-03-20 RX ORDER — OMEPRAZOLE 40 MG/1
40 CAPSULE, DELAYED RELEASE ORAL DAILY
Qty: 30 CAPSULE | Refills: 11 | Status: SHIPPED | OUTPATIENT
Start: 2023-03-20 | End: 2024-03-19

## 2023-03-20 RX ORDER — ASPIRIN 325 MG
50000 TABLET, DELAYED RELEASE (ENTERIC COATED) ORAL WEEKLY
Qty: 5 CAPSULE | Refills: 5 | Status: SHIPPED | OUTPATIENT
Start: 2023-03-20 | End: 2023-07-24 | Stop reason: SDUPTHER

## 2023-03-20 RX ORDER — ZOLPIDEM TARTRATE 10 MG/1
10 TABLET ORAL NIGHTLY
Qty: 30 TABLET | Refills: 5 | Status: SHIPPED | OUTPATIENT
Start: 2023-03-20 | End: 2023-10-17 | Stop reason: SDUPTHER

## 2023-03-20 RX ORDER — DICLOFENAC SODIUM 50 MG/1
50 TABLET, DELAYED RELEASE ORAL 2 TIMES DAILY PRN
Qty: 60 TABLET | Refills: 5 | Status: SHIPPED | OUTPATIENT
Start: 2023-03-20

## 2023-03-20 NOTE — PROGRESS NOTES
"Subjective:       Patient ID: Sean Dunham is a 42 y.o. male.    Chief Complaint: Follow-up (Bilateral thumb pain , needing refill of Vit D. )    The patient is complaining of joint pain involving the MCP PIP wrist elbow shoulders hips knees and ankles bilaterally.  The pain is 7/10 in intensity dull in quality and continuous.  That is associated with a morning stiffness lasting for more than 60 minutes.  Is also having difficulty maintaining a good night of sleep.  This has been associated with myalgias.  Muscle aches are 7/10 in intensity dull in quality and continuous.  They are associated with fatigue.  No fever no chills no others.      Review of Systems   Constitutional:  Negative for appetite change, chills and fever.   HENT:  Negative for congestion, ear pain, mouth sores, nosebleeds and trouble swallowing.    Eyes:  Negative for photophobia and discharge.   Respiratory:  Negative for chest tightness and shortness of breath.    Cardiovascular:  Negative for chest pain.   Gastrointestinal:  Negative for abdominal pain and vomiting.   Endocrine: Negative.    Genitourinary:  Negative for hematuria.   Musculoskeletal:         As per HPI   Skin:  Negative for rash.   Neurological:  Negative for weakness.       Objective:   /68 (BP Location: Right arm, Patient Position: Sitting, BP Method: Medium (Automatic))   Pulse 86   Temp 98.2 °F (36.8 °C) (Oral)   Resp 18   Ht 5' 5" (1.651 m)   Wt 74.9 kg (165 lb 3.2 oz)   SpO2 98%   BMI 27.49 kg/m²      Physical Exam   Constitutional: He is oriented to person, place, and time. He appears well-developed and well-nourished. No distress.   HENT:   Head: Normocephalic and atraumatic.   Right Ear: External ear normal.   Left Ear: External ear normal.   Eyes: Pupils are equal, round, and reactive to light.   Cardiovascular: Normal rate, regular rhythm and normal heart sounds.   Pulmonary/Chest: Breath sounds normal.   Abdominal: Soft. There is no abdominal " tenderness.   Musculoskeletal:      Right shoulder: Tenderness present.      Left shoulder: Tenderness present.      Right elbow: Tenderness present.      Left elbow: Tenderness present.      Right wrist: Tenderness present.      Left wrist: Tenderness present.      Cervical back: Neck supple.      Right hip: Tenderness present.      Left hip: Tenderness present.      Right knee: Tenderness present.      Left knee: Tenderness present.      Right ankle: Tenderness present.      Left ankle: Tenderness present.   Lymphadenopathy:     He has no cervical adenopathy.   Neurological: He is alert and oriented to person, place, and time. He displays normal reflexes. No cranial nerve deficit or sensory deficit. He exhibits normal muscle tone. Coordination normal.   Skin: No rash noted. No erythema.   Vitals reviewed.      Right Side Rheumatological Exam     The patient is tender to palpation of the shoulder, elbow, wrist, knee, 1st PIP, 1st MCP, 2nd PIP, 2nd MCP, 3rd PIP, 3rd MCP, 4th PIP, 4th MCP, 5th PIP, hip, ankle, 1st MTP, 2nd MTP, 3rd MTP, 4th MTP, 5th MTP, 1st toe IP, 2nd toe IP, 3rd toe IP, 4th toe IP and 5th toe IP    Left Side Rheumatological Exam     The patient is tender to palpation of the shoulder, elbow, wrist, knee, 1st PIP, 1st MCP, 2nd PIP, 2nd MCP, 3rd PIP, 3rd MCP, 4th PIP, 4th MCP, 5th PIP, 5th MCP, hip, ankle, 1st MTP, 2nd MTP, 3rd MTP, 4th MTP, 5th MTP, 1st toe IP, 2nd toe IP, 3rd toe IP, 4th toe IP and 5th toe IP.       Completed Fibromyalgia exam 18/18 tender points.  No data to display     Assessment:       1. Seropositive rheumatoid arthritis    2. Fibromyalgia syndrome    3. WALTER (generalized anxiety disorder)    4. Vitamin D deficiency    5. Generalized anxiety disorder    6. Gastroesophageal reflux disease without esophagitis            Medication List with Changes/Refills   New Medications    HYDROXYCHLOROQUINE (PLAQUENIL) 200 MG TABLET    Take 1 tablet (200 mg total) by mouth 2 (two) times  daily. After food       Start Date: 3/20/2023 End Date: 2023    ZOLPIDEM (AMBIEN) 10 MG TAB    Take 1 tablet (10 mg total) by mouth every evening.       Start Date: 3/20/2023 End Date: 2023   Current Medications    ASPIRIN (ECOTRIN) 81 MG EC TABLET    Take 1 tablet (81 mg total) by mouth once daily.       Start Date: 2022 End Date: 2023    DICLOFENAC SODIUM (VOLTAREN) 1 % GEL    Apply 2 g topically once daily.       Start Date: 2022 End Date: 2023    HYDRALAZINE (APRESOLINE) 25 MG TABLET    Take 1 tablet (25 mg total) by mouth once daily at 6am.       Start Date: 2022 End Date: 2023    HYDROXYZINE PAMOATE (VISTARIL) 25 MG CAP    Take 25 mg by mouth 3 (three) times daily.       Start Date: 2022 End Date: --    LISINOPRIL-HYDROCHLOROTHIAZIDE (PRINZIDE,ZESTORETIC) 20-25 MG TAB    Take 1 tablet by mouth once daily.       Start Date: 2022 End Date: --    LOSARTAN (COZAAR) 100 MG TABLET    Take 1 tablet (100 mg total) by mouth once daily.       Start Date: 2022 End Date: 2023    MUPIROCIN (BACTROBAN) 2 % OINTMENT    SMARTSI Application Topical 2-3 Times Daily       Start Date: 2022 End Date: --    NIFEDIPINE (ADALAT CC) 60 MG TBSR    Take 60 mg by mouth once daily.       Start Date: 2022End Date: --    NIFEDIPINE (PROCARDIA-XL) 60 MG (OSM) 24 HR TABLET    Take 1 tablet (60 mg total) by mouth once daily.       Start Date: 2022 End Date: 2023   Changed and/or Refilled Medications    Modified Medication Previous Medication    CHOLECALCIFEROL, VITAMIN D3, 1,250 MCG (50,000 UNIT) CAPSULE cholecalciferol, vitamin D3, 1,250 mcg (50,000 unit) capsule       Take 1 capsule (50,000 Units total) by mouth once a week.    Take 1 capsule (50,000 Units total) by mouth once a week.       Start Date: 3/20/2023 End Date: --    Start Date: 8/10/2022 End Date: 3/20/2023    DICLOFENAC (VOLTAREN) 50 MG EC TABLET diclofenac (VOLTAREN) 50 MG EC tablet       Take 1  tablet (50 mg total) by mouth 2 (two) times daily as needed (pain, after food).    Take 1 tablet (50 mg total) by mouth 2 (two) times daily as needed (pain, after food).       Start Date: 3/20/2023 End Date: --    Start Date: 7/28/2022 End Date: 3/20/2023    OMEPRAZOLE (PRILOSEC) 40 MG CAPSULE omeprazole (PRILOSEC) 40 MG capsule       Take 1 capsule (40 mg total) by mouth once daily. Before lunch    Take 1 capsule (40 mg total) by mouth once daily. Before lunch       Start Date: 3/20/2023 End Date: 3/19/2024    Start Date: 7/28/2022 End Date: 3/20/2023   Discontinued Medications    MELOXICAM (MOBIC) 7.5 MG TABLET    Take 7.5 mg by mouth 2 (two) times daily.       Start Date: 1/26/2023 End Date: 3/20/2023         Plan:         Problem List Items Addressed This Visit          Psychiatric    WALTER (generalized anxiety disorder)    Relevant Medications    cholecalciferol, vitamin D3, 1,250 mcg (50,000 unit) capsule    diclofenac (VOLTAREN) 50 MG EC tablet    omeprazole (PRILOSEC) 40 MG capsule    hydrOXYchloroQUINE (PLAQUENIL) 200 mg tablet    zolpidem (AMBIEN) 10 mg Tab       Immunology/Multi System    Seropositive rheumatoid arthritis - Primary    Relevant Medications    cholecalciferol, vitamin D3, 1,250 mcg (50,000 unit) capsule    diclofenac (VOLTAREN) 50 MG EC tablet    omeprazole (PRILOSEC) 40 MG capsule    hydrOXYchloroQUINE (PLAQUENIL) 200 mg tablet    zolpidem (AMBIEN) 10 mg Tab       Endocrine    Vitamin D deficiency    Relevant Medications    cholecalciferol, vitamin D3, 1,250 mcg (50,000 unit) capsule    diclofenac (VOLTAREN) 50 MG EC tablet    omeprazole (PRILOSEC) 40 MG capsule    hydrOXYchloroQUINE (PLAQUENIL) 200 mg tablet    zolpidem (AMBIEN) 10 mg Tab       GI    GERD (gastroesophageal reflux disease)    Relevant Medications    cholecalciferol, vitamin D3, 1,250 mcg (50,000 unit) capsule    diclofenac (VOLTAREN) 50 MG EC tablet    omeprazole (PRILOSEC) 40 MG capsule    hydrOXYchloroQUINE (PLAQUENIL) 200  mg tablet    zolpidem (AMBIEN) 10 mg Tab       Orthopedic    Fibromyalgia syndrome    Relevant Medications    cholecalciferol, vitamin D3, 1,250 mcg (50,000 unit) capsule    diclofenac (VOLTAREN) 50 MG EC tablet    omeprazole (PRILOSEC) 40 MG capsule    hydrOXYchloroQUINE (PLAQUENIL) 200 mg tablet    zolpidem (AMBIEN) 10 mg Tab     Other Visit Diagnoses       Generalized anxiety disorder        Relevant Medications    cholecalciferol, vitamin D3, 1,250 mcg (50,000 unit) capsule    diclofenac (VOLTAREN) 50 MG EC tablet    omeprazole (PRILOSEC) 40 MG capsule    hydrOXYchloroQUINE (PLAQUENIL) 200 mg tablet    zolpidem (AMBIEN) 10 mg Tab

## 2023-04-21 RX ORDER — LISINOPRIL AND HYDROCHLOROTHIAZIDE 20; 25 MG/1; MG/1
1 TABLET ORAL DAILY
Qty: 30 TABLET | Refills: 2 | OUTPATIENT
Start: 2023-04-21

## 2023-04-21 NOTE — TELEPHONE ENCOUNTER
----- Message from Coral Silverio sent at 4/21/2023 12:17 PM CDT -----  Regarding: Refill  Provider: DR Radha Blackburn   Preferred Pharmacy: [facility]  Last Visit:   Next Visit: 5/24/23  Patient's Contact Number:     1. Name of Medication: lisinopril  Dosage: 20-25 MG  TAB  Comments:     2. Name of Medication:   Dosage:   Comments:     3. Name of Medication:   Dosage:   Comments     4. Name of Medication:   Dosage:   Comments:     5. Name of Medication:   Dosage:   Comments:

## 2023-04-24 ENCOUNTER — INFUSION (OUTPATIENT)
Dept: INFUSION THERAPY | Facility: HOSPITAL | Age: 43
End: 2023-04-24
Attending: INTERNAL MEDICINE
Payer: MEDICAID

## 2023-04-24 ENCOUNTER — OFFICE VISIT (OUTPATIENT)
Dept: HEMATOLOGY/ONCOLOGY | Facility: CLINIC | Age: 43
End: 2023-04-24
Payer: MEDICAID

## 2023-04-24 ENCOUNTER — DOCUMENTATION ONLY (OUTPATIENT)
Dept: INFUSION THERAPY | Facility: HOSPITAL | Age: 43
End: 2023-04-24

## 2023-04-24 VITALS
SYSTOLIC BLOOD PRESSURE: 147 MMHG | TEMPERATURE: 99 F | HEART RATE: 90 BPM | OXYGEN SATURATION: 100 % | RESPIRATION RATE: 18 BRPM | WEIGHT: 165.31 LBS | BODY MASS INDEX: 27.54 KG/M2 | HEIGHT: 65 IN | DIASTOLIC BLOOD PRESSURE: 97 MMHG

## 2023-04-24 DIAGNOSIS — Z72.0 TOBACCO USE: ICD-10-CM

## 2023-04-24 DIAGNOSIS — D75.1 SECONDARY ERYTHROCYTOSIS: Primary | ICD-10-CM

## 2023-04-24 DIAGNOSIS — I10 PRIMARY HYPERTENSION: ICD-10-CM

## 2023-04-24 PROCEDURE — 3077F PR MOST RECENT SYSTOLIC BLOOD PRESSURE >= 140 MM HG: ICD-10-PCS | Mod: CPTII,,, | Performed by: INTERNAL MEDICINE

## 2023-04-24 PROCEDURE — 1160F RVW MEDS BY RX/DR IN RCRD: CPT | Mod: CPTII,,, | Performed by: INTERNAL MEDICINE

## 2023-04-24 PROCEDURE — 1159F PR MEDICATION LIST DOCUMENTED IN MEDICAL RECORD: ICD-10-PCS | Mod: CPTII,,, | Performed by: INTERNAL MEDICINE

## 2023-04-24 PROCEDURE — 3008F BODY MASS INDEX DOCD: CPT | Mod: CPTII,,, | Performed by: INTERNAL MEDICINE

## 2023-04-24 PROCEDURE — 3077F SYST BP >= 140 MM HG: CPT | Mod: CPTII,,, | Performed by: INTERNAL MEDICINE

## 2023-04-24 PROCEDURE — 3080F DIAST BP >= 90 MM HG: CPT | Mod: CPTII,,, | Performed by: INTERNAL MEDICINE

## 2023-04-24 PROCEDURE — 4010F PR ACE/ARB THEARPY RXD/TAKEN: ICD-10-PCS | Mod: CPTII,,, | Performed by: INTERNAL MEDICINE

## 2023-04-24 PROCEDURE — 99213 PR OFFICE/OUTPT VISIT, EST, LEVL III, 20-29 MIN: ICD-10-PCS | Mod: S$PBB,,, | Performed by: INTERNAL MEDICINE

## 2023-04-24 PROCEDURE — 3008F PR BODY MASS INDEX (BMI) DOCUMENTED: ICD-10-PCS | Mod: CPTII,,, | Performed by: INTERNAL MEDICINE

## 2023-04-24 PROCEDURE — 99214 OFFICE O/P EST MOD 30 MIN: CPT | Mod: PBBFAC | Performed by: INTERNAL MEDICINE

## 2023-04-24 PROCEDURE — 99213 OFFICE O/P EST LOW 20 MIN: CPT | Mod: S$PBB,,, | Performed by: INTERNAL MEDICINE

## 2023-04-24 PROCEDURE — 4010F ACE/ARB THERAPY RXD/TAKEN: CPT | Mod: CPTII,,, | Performed by: INTERNAL MEDICINE

## 2023-04-24 PROCEDURE — 1160F PR REVIEW ALL MEDS BY PRESCRIBER/CLIN PHARMACIST DOCUMENTED: ICD-10-PCS | Mod: CPTII,,, | Performed by: INTERNAL MEDICINE

## 2023-04-24 PROCEDURE — 1159F MED LIST DOCD IN RCRD: CPT | Mod: CPTII,,, | Performed by: INTERNAL MEDICINE

## 2023-04-24 PROCEDURE — 3080F PR MOST RECENT DIASTOLIC BLOOD PRESSURE >= 90 MM HG: ICD-10-PCS | Mod: CPTII,,, | Performed by: INTERNAL MEDICINE

## 2023-04-24 NOTE — PROGRESS NOTES
History:  Past Medical History:   Diagnosis Date    Acid reflux     Anxiety     Hypertension    Past medical history:  Hypertension.  Tobacco abuse.       History reviewed. No pertinent surgical history.   Social History     Socioeconomic History    Marital status: Single   Tobacco Use    Smoking status: Former     Types: Vaping with nicotine    Smokeless tobacco: Never   Substance and Sexual Activity    Alcohol use: Yes     Alcohol/week: 4.0 standard drinks     Types: 1 Glasses of wine, 3 Standard drinks or equivalent per week     Comment: Daily    Drug use: Never    Sexual activity: Yes     Partners: Female      Family History   Problem Relation Age of Onset    No Known Problems Mother     No Known Problems Father       Reason for Follow-up:  Secondary erythrocytosis    Tobacco use     History of Present Illness:   Pain    Oncologic/Hematologic History:  Oncology History    No history exists.   # secondary erythrocytosis:  Severe erythrocytosis, hemoglobin 24, diagnosed in this generally healthy middle-aged gentleman,   about 17-pack-year smoker but with no history of cardiopulmonary problems or dyspnea.   Erythropoietin level is not suppressed; this is a point against polycythemia vera.    Bone marrow examination does not suggest polycythemia vera.    JANESSA-2 mutation negative (both JANESSA-2 V617F, as well as exons 12-15 are negative).  CALR mutation negative in bone marrow.  >> Polycythemia vera ruled out   (erythropoietin level not suppressed; JANESSA mutation negative)  Patient has some kind of secondary erythrocytosis   -Phlebotomized: 11/20/2017; 12/11/2017; 01/02/2018; 03/12/2019 (H/H 18.6/51.4); 07/15/2019 (H/H 18.0/52.2); 10/14/2019 (H/H 18.1/53.6)   -H/H: 16.2/48.8 (12/09/2019); 16.2/48.8 (11/11/2019; 16.0/48.1 (10/21/2019); 18.1/53.6 (10/08/2019), etc.   -After last therapeutic phlebotomy on 10/14/2019 (H/H 18.1/23.6), his H/H has remained stable, 16.6/49.9 as of 02/10/2020  -Therapeutic phlebotomy 605 cc on  04/21/2020 (H/H 17.7/52.2)   -07/06/2020: H/H 17.4/51.6  -Therapeutic phlebotomy 07/09/2020 (H/H 17.4/51.6), 07/20/2020 (H/H 15.3/45.3), 07/27/2020 (H/H 14.8/45.2)   -08/03/2020: H/H 14.0/42.2 (phlebotomy held)   -08/11/2020: H/H 13.9/41.4 (phlebotomy held)  -09/14/2020: CBC reviewed.  H/H 14.6/44.1.  -Since 09/15/2020: Therapeutic phlebotomy: 12/14/2020, 04/19/2021, 08/16/2021, 11/22/2021, 03/21/2022, 04/18/2022        -11/28/2022:  H/H 13.9/45.0   -S/P therapeutic phlebotomy 11/28/2022, per patient preference (534 mL phlebotomized)  -01/03/2023:  H/H 13.1/43.7    Interval History:  THERAPEUTIC PHLEBOTOMY   [No matching plan found]   04/24/2023:   -follows up with Rheumatology for seropositive rheumatoid arthritis, vitamin-D deficiency, fibromyalgia  -also, generalized anxiety disorder  -04/24/2023:  Hemoglobin 13.7.  Hematocrit 42.7.  Presents for follow-up visit.  Doing well.  No symptoms of hyperviscosity state.  No headaches, weakness, fatigue, malaise, etc..      Medications:  Current Outpatient Medications on File Prior to Visit   Medication Sig Dispense Refill    aspirin (ECOTRIN) 81 MG EC tablet Take 1 tablet (81 mg total) by mouth once daily. 30 tablet 11    cholecalciferol, vitamin D3, 1,250 mcg (50,000 unit) capsule Take 1 capsule (50,000 Units total) by mouth once a week. 5 capsule 5    diclofenac (VOLTAREN) 50 MG EC tablet Take 1 tablet (50 mg total) by mouth 2 (two) times daily as needed (pain, after food). 60 tablet 5    diclofenac sodium (VOLTAREN) 1 % Gel Apply 2 g topically once daily. 100 g 11    hydrALAZINE (APRESOLINE) 25 MG tablet Take 1 tablet (25 mg total) by mouth once daily at 6am. 30 tablet 11    hydrOXYchloroQUINE (PLAQUENIL) 200 mg tablet Take 1 tablet (200 mg total) by mouth 2 (two) times daily. After food 60 tablet 5    hydrOXYzine pamoate (VISTARIL) 25 MG Cap Take 25 mg by mouth 3 (three) times daily.      lisinopriL-hydrochlorothiazide (PRINZIDE,ZESTORETIC) 20-25 mg Tab Take 1  tablet by mouth once daily.      losartan (COZAAR) 100 MG tablet Take 1 tablet (100 mg total) by mouth once daily. 30 tablet 11    mupirocin (BACTROBAN) 2 % ointment SMARTSI Application Topical 2-3 Times Daily      NIFEdipine (ADALAT CC) 60 MG TbSR Take 60 mg by mouth once daily.      NIFEdipine (PROCARDIA-XL) 60 MG (OSM) 24 hr tablet Take 1 tablet (60 mg total) by mouth once daily. 30 tablet 11    omeprazole (PRILOSEC) 40 MG capsule Take 1 capsule (40 mg total) by mouth once daily. Before lunch 30 capsule 11    zolpidem (AMBIEN) 10 mg Tab Take 1 tablet (10 mg total) by mouth every evening. 30 tablet 5     No current facility-administered medications on file prior to visit.       Review of Systems:   All systems reviewed and found to be negative except for the symptoms detailed above    Physical Examination:   VITAL SIGNS:   Vitals:    23 1420   BP: (!) 147/97   Pulse: 90   Resp: 18   Temp: 99 °F (37.2 °C)       GENERAL:  In no apparent distress.    HEAD:  No signs of head trauma.  EYES:  Pupils are equal.  Extraocular motions intact.    EARS:  Hearing grossly intact.  MOUTH:  Oropharynx is normal.   NECK:  No adenopathy, no JVD.     CHEST:  Chest with clear breath sounds bilaterally.  No wheezes, rales, rhonchi.    CARDIAC:  Regular rate and rhythm.  S1 and S2, without murmurs, gallops, rubs.  VASCULAR:  No Edema.  Peripheral pulses normal and equal in all extremities.  ABDOMEN:  Soft, without detectable tenderness.  No sign of distention.  No   rebound or guarding, and no masses palpated.   Bowel Sounds normal.  MUSCULOSKELETAL:  Good range of motion of all major joints. Extremities without clubbing, cyanosis or edema.    NEUROLOGIC EXAM:  Alert and oriented x 3.  No focal sensory or strength deficits.   Speech normal.  Follows commands.  PSYCHIATRIC:  Mood normal.    No results for input(s): CBC in the last 72 hours.   No results for input(s): CMP in the last 72 hours.     Assessment:  Problem List Items  Addressed This Visit          Oncology    Secondary erythrocytosis - Primary       Other    Tobacco use     Secondary erythrocytosis:  Presented with hemoglobin 24, completely asymptomatic  Smoking history but no dyspnea  JAK2 mutation negative.  EPO level not suppressed  >> thus, polycythemia vera ruled out  However, has behaved more or less like polycythemia vera (persistently elevated H/H), requiring periodic therapeutic phlebotomy treatments (but no history of blood clots, aquagenic pruritus, or any symptoms of hyperviscosity state)  Initiated therapeutic phlebotomy treatments in view of severity of hemoglobin elevation  Empirically, target hematocrit <45  -01/30/2023:  H/H 13.0/41.5  -04/24/2023:  Hemoglobin 13.7.  Hematocrit 42.7.        Plan:   -No evidence of polycythemia vera   -Has secondary polycythemia   -No symptoms of hyperviscosity state   -However, has behaved more or less like polycythemia vera (persistently elevated H/H), requiring periodic therapeutic phlebotomy treatments (however, no symptoms of hyperviscosity state, blood clots, aquagenic pruritus, erythromelalgia, etc.)  -01/30/2023:  H/H 13.0/41.5  -04/24/2023:  Hemoglobin 13.7.  Hematocrit 42.7.  >>>   -Therapeutic phlebotomy as needed  -no indication of therapeutic phlebotomy at this time   -although secondary polycythemia, however, in his case, empirical target hematocrit <45   -Hold phlebotomy if hematocrit <45   -in view of history of hypertension and tobacco abuse, reasonable to continue baby aspirin 81 mg p.o. daily (in the absence of cardiovascular risk factors, baby aspirin is not indicated in secondary polycythemia)      -He has quit smoking    Follow-up in 3 months, with CBC and CMP     Above discussed with him.  All questions answered.    Discussed labs and gave him copies of relevant records.    He understands and agrees with this plan.   ----------------        Discussion:   He has secondary erythrocytosis of unclear etiology.      No evidence of polycythemia vera (normal erythropoietin level and negative JANESSA-2 mutation rule out polycythemia vera).   However, in terms of persistence of elevated H/H, has behaved more or less like polycythemia vera.   Had no symptoms of hyperviscosity state at any time despite severe elevation of hemoglobin.   Regardless, to be on the safer side, will continue therapeutic phlebotomy as needed.   He is proud that he has quit smoking altogether on my advice.       Follow-up:  No follow-ups on file.

## 2023-04-24 NOTE — TELEPHONE ENCOUNTER
----- Message from Colleen Jose sent at 4/24/2023 11:41 AM CDT -----  Regarding: Refill  Provider: CLAUDIA Matthews  Preferred Pharmacy: [facility] & [address]  Last Visit:  Next Visit: 5/24/2023  Patient's Contact Number:    1. Name of Medication: Losartan   Dosage: 100 mg tab  Comments:    2. Name of Medication:  Dosage:  Comments:    3. Name of Medication:  Dosage:  Comments    4. Name of Medication:  Dosage:  Comments:    5. Name of Medication:  Dosage:  Comments:       
98.2

## 2023-04-24 NOTE — NURSING
Pt did arrive at 1:20 to do labs and provider visit.  Hct 42.7 so pt is below parameters for phlebotomy.

## 2023-04-25 RX ORDER — LOSARTAN POTASSIUM 100 MG/1
100 TABLET ORAL DAILY
Qty: 30 TABLET | Refills: 2 | Status: SHIPPED | OUTPATIENT
Start: 2023-04-25 | End: 2023-05-24 | Stop reason: SDUPTHER

## 2023-05-24 ENCOUNTER — OFFICE VISIT (OUTPATIENT)
Dept: FAMILY MEDICINE | Facility: CLINIC | Age: 43
End: 2023-05-24
Payer: MEDICAID

## 2023-05-24 VITALS
HEART RATE: 95 BPM | TEMPERATURE: 98 F | BODY MASS INDEX: 28.68 KG/M2 | OXYGEN SATURATION: 100 % | SYSTOLIC BLOOD PRESSURE: 122 MMHG | RESPIRATION RATE: 18 BRPM | WEIGHT: 168 LBS | HEIGHT: 64 IN | DIASTOLIC BLOOD PRESSURE: 77 MMHG

## 2023-05-24 DIAGNOSIS — I10 PRIMARY HYPERTENSION: ICD-10-CM

## 2023-05-24 DIAGNOSIS — Z00.00 WELLNESS EXAMINATION: Primary | ICD-10-CM

## 2023-05-24 LAB
APPEARANCE UR: CLEAR
BACTERIA #/AREA URNS AUTO: ABNORMAL /HPF
BILIRUB UR QL STRIP.AUTO: NEGATIVE MG/DL
CHOLEST SERPL-MCNC: 132 MG/DL
CHOLEST/HDLC SERPL: 3 {RATIO} (ref 0–5)
COLOR UR: ABNORMAL
EST. AVERAGE GLUCOSE BLD GHB EST-MCNC: 102.5 MG/DL
GLUCOSE UR QL STRIP.AUTO: NORMAL MG/DL
HBA1C MFR BLD: 5.2 %
HDLC SERPL-MCNC: 38 MG/DL (ref 35–60)
HIV 1+2 AB+HIV1 P24 AG SERPL QL IA: NONREACTIVE
HYALINE CASTS #/AREA URNS LPF: ABNORMAL /LPF
KETONES UR QL STRIP.AUTO: NEGATIVE MG/DL
LDLC SERPL CALC-MCNC: 49 MG/DL (ref 50–140)
LEUKOCYTE ESTERASE UR QL STRIP.AUTO: NEGATIVE UNIT/L
MUCOUS THREADS URNS QL MICRO: ABNORMAL /LPF
NITRITE UR QL STRIP.AUTO: NEGATIVE
PH UR STRIP.AUTO: 7 [PH]
PROT UR QL STRIP.AUTO: NEGATIVE MG/DL
RBC #/AREA URNS AUTO: ABNORMAL /HPF
RBC UR QL AUTO: NEGATIVE UNIT/L
SP GR UR STRIP.AUTO: 1.02
SQUAMOUS #/AREA URNS LPF: ABNORMAL /HPF
TRIGL SERPL-MCNC: 227 MG/DL (ref 34–140)
UROBILINOGEN UR STRIP-ACNC: NORMAL MG/DL
VLDLC SERPL CALC-MCNC: 45 MG/DL
WBC #/AREA URNS AUTO: ABNORMAL /HPF

## 2023-05-24 PROCEDURE — 1160F PR REVIEW ALL MEDS BY PRESCRIBER/CLIN PHARMACIST DOCUMENTED: ICD-10-PCS | Mod: CPTII,,, | Performed by: NURSE PRACTITIONER

## 2023-05-24 PROCEDURE — 4010F ACE/ARB THERAPY RXD/TAKEN: CPT | Mod: CPTII,,, | Performed by: NURSE PRACTITIONER

## 2023-05-24 PROCEDURE — 4010F PR ACE/ARB THEARPY RXD/TAKEN: ICD-10-PCS | Mod: CPTII,,, | Performed by: NURSE PRACTITIONER

## 2023-05-24 PROCEDURE — 36415 COLL VENOUS BLD VENIPUNCTURE: CPT | Performed by: NURSE PRACTITIONER

## 2023-05-24 PROCEDURE — 1160F RVW MEDS BY RX/DR IN RCRD: CPT | Mod: CPTII,,, | Performed by: NURSE PRACTITIONER

## 2023-05-24 PROCEDURE — 1159F MED LIST DOCD IN RCRD: CPT | Mod: CPTII,,, | Performed by: NURSE PRACTITIONER

## 2023-05-24 PROCEDURE — 81001 URINALYSIS AUTO W/SCOPE: CPT | Performed by: NURSE PRACTITIONER

## 2023-05-24 PROCEDURE — 1159F PR MEDICATION LIST DOCUMENTED IN MEDICAL RECORD: ICD-10-PCS | Mod: CPTII,,, | Performed by: NURSE PRACTITIONER

## 2023-05-24 PROCEDURE — 3078F DIAST BP <80 MM HG: CPT | Mod: CPTII,,, | Performed by: NURSE PRACTITIONER

## 2023-05-24 PROCEDURE — 99215 OFFICE O/P EST HI 40 MIN: CPT | Mod: PBBFAC | Performed by: NURSE PRACTITIONER

## 2023-05-24 PROCEDURE — 83036 HEMOGLOBIN GLYCOSYLATED A1C: CPT | Performed by: NURSE PRACTITIONER

## 2023-05-24 PROCEDURE — 80061 LIPID PANEL: CPT | Performed by: NURSE PRACTITIONER

## 2023-05-24 PROCEDURE — 87389 HIV-1 AG W/HIV-1&-2 AB AG IA: CPT | Performed by: NURSE PRACTITIONER

## 2023-05-24 PROCEDURE — 3008F BODY MASS INDEX DOCD: CPT | Mod: CPTII,,, | Performed by: NURSE PRACTITIONER

## 2023-05-24 PROCEDURE — 3078F PR MOST RECENT DIASTOLIC BLOOD PRESSURE < 80 MM HG: ICD-10-PCS | Mod: CPTII,,, | Performed by: NURSE PRACTITIONER

## 2023-05-24 PROCEDURE — 99396 PREV VISIT EST AGE 40-64: CPT | Mod: S$PBB,,, | Performed by: NURSE PRACTITIONER

## 2023-05-24 PROCEDURE — 3074F PR MOST RECENT SYSTOLIC BLOOD PRESSURE < 130 MM HG: ICD-10-PCS | Mod: CPTII,,, | Performed by: NURSE PRACTITIONER

## 2023-05-24 PROCEDURE — 3008F PR BODY MASS INDEX (BMI) DOCUMENTED: ICD-10-PCS | Mod: CPTII,,, | Performed by: NURSE PRACTITIONER

## 2023-05-24 PROCEDURE — 3074F SYST BP LT 130 MM HG: CPT | Mod: CPTII,,, | Performed by: NURSE PRACTITIONER

## 2023-05-24 PROCEDURE — 99396 PR PREVENTIVE VISIT,EST,40-64: ICD-10-PCS | Mod: S$PBB,,, | Performed by: NURSE PRACTITIONER

## 2023-05-24 RX ORDER — HYDRALAZINE HYDROCHLORIDE 25 MG/1
25 TABLET, FILM COATED ORAL DAILY PRN
Qty: 30 TABLET | Refills: 11 | Status: SHIPPED | OUTPATIENT
Start: 2023-05-24 | End: 2024-05-23

## 2023-05-24 RX ORDER — NIFEDIPINE 60 MG/1
60 TABLET, EXTENDED RELEASE ORAL DAILY
Qty: 90 TABLET | Refills: 3 | Status: SHIPPED | OUTPATIENT
Start: 2023-05-24 | End: 2024-05-23

## 2023-05-24 RX ORDER — LOSARTAN POTASSIUM 100 MG/1
100 TABLET ORAL DAILY
Qty: 90 TABLET | Refills: 3 | Status: SHIPPED | OUTPATIENT
Start: 2023-05-24 | End: 2024-03-11

## 2023-05-24 NOTE — PROGRESS NOTES
Please notify patient regarding his cholesterol within normal range.  Triglycerides slightly elevated monitor cholesterol and fat intake.  Within normal range no sign of infection.  HIV negative no sign of infection.  Hemoglobin A1c within normal range no sign of diabetes.  Return to clinic as needed as well you can reach the clinic via phone at any time.

## 2023-05-24 NOTE — PROGRESS NOTES
Patient Name: Sean Dunham   : 1980  MRN: [id number]     SUBJECTIVE DATA:    CHIEF COMPLAINT:   Sean Dunham is a 42 y.o. male who presents to clinic today with Annual Exam    HPI:  42-year-old male presents to the clinic to complete his yearly wellness exam.  Requesting medication refills.    Social Determinants of Health     Tobacco Use: High Risk    Smoking Tobacco Use: Every Day    Smokeless Tobacco Use: Never    Passive Exposure: Not on file   Alcohol Use: Not At Risk    Frequency of Alcohol Consumption: Monthly or less    Average Number of Drinks: 1 or 2    Frequency of Binge Drinking: Never   Financial Resource Strain: Low Risk     Difficulty of Paying Living Expenses: Not hard at all   Food Insecurity: No Food Insecurity    Worried About Running Out of Food in the Last Year: Never true    Ran Out of Food in the Last Year: Never true   Transportation Needs: No Transportation Needs    Lack of Transportation (Medical): No    Lack of Transportation (Non-Medical): No   Physical Activity: Insufficiently Active    Days of Exercise per Week: 1 day    Minutes of Exercise per Session: 20 min   Stress: No Stress Concern Present    Feeling of Stress : Only a little   Social Connections: Moderately Integrated    Frequency of Communication with Friends and Family: More than three times a week    Frequency of Social Gatherings with Friends and Family: Three times a week    Attends Sabianism Services: 1 to 4 times per year    Active Member of Clubs or Organizations: Yes    Attends Club or Organization Meetings: Never    Marital Status: Never    Housing Stability: Low Risk     Unable to Pay for Housing in the Last Year: No    Number of Places Lived in the Last Year: 1    Unstable Housing in the Last Year: No   Depression: Low Risk     Last PHQ Score: 0      Gun safety:  no guns at home.  Car safety:  Seat belt used all the time.  Water:  Stay hydrated with fluids, drink 6-8 cups of water daily.  Smoke  "cessation:  Patient declined smoke cessation, and she is trying to quit on her own.  Patient is made aware of services offered at [facility].  Alcohol:  Drink in moderation.  Exercise:   exercise 30 minutes a day up to 5 days a week.  Stay active, lose weight.  Nutrition:  Follow low-cholesterol, low-fat, low-salt diet.  fresh fruits and vegetables. Fiber intack 30g/day.  Dental:  Patient does follow-up with a dentist yearly.  Ophthalmology:   Vision Screening    Right eye Left eye Both eyes   Without correction 20/25 20/15 20/18   With correction         Fasting blood work drawn today will notify of test results when they become available.    Patient denies chest pain, shortness of breath, dyspnea on exertion, palpitations, peripheral edema, abdominal pain, nausea, vomiting, diarrhea, constipation, fatigue, fever, chills, dysuria,  hematuria, melena, or hematochezia.    HPI      ALLERGIES: Review of patient's allergies indicates:  No Known Allergies      ROS:  Review of Systems   All other systems reviewed and are negative.      OBJECTIVE DATA:  Vital signs  Vitals:    05/24/23 0844   BP: 122/77   Pulse: 95   Resp: 18   Temp: 98 °F (36.7 °C)   TempSrc: Oral   SpO2: 100%   Weight: 76.2 kg (168 lb)   Height: 5' 4" (1.626 m)      Body mass index is 28.84 kg/m².    PHYSICAL EXAM:   Physical Exam  Vitals and nursing note reviewed.   Constitutional:       General: He is awake. He is not in acute distress.     Appearance: Normal appearance. He is well-developed, well-groomed and overweight. He is not ill-appearing, toxic-appearing or diaphoretic.   HENT:      Head: Normocephalic and atraumatic.      Right Ear: Hearing, tympanic membrane, ear canal and external ear normal.      Left Ear: Hearing, tympanic membrane, ear canal and external ear normal.      Nose: Nose normal. No congestion or rhinorrhea.      Right Nostril: No epistaxis.      Left Nostril: No epistaxis.      Right Turbinates: Not swollen.      Left Turbinates: " Not swollen.      Mouth/Throat:      Lips: Pink.      Mouth: Mucous membranes are moist.      Dentition: Gingival swelling present.      Pharynx: Oropharynx is clear. Uvula midline. No posterior oropharyngeal erythema.      Comments: Has an appointment with a dentist regarding his gingival swelling.  Use soft toothbrush.  Floss between meals.  Eyes:      General: Lids are normal. Gaze aligned appropriately. No scleral icterus.     Extraocular Movements: Extraocular movements intact.      Conjunctiva/sclera: Conjunctivae normal.      Pupils: Pupils are equal, round, and reactive to light.      Visual Fields: Right eye visual fields normal and left eye visual fields normal.   Neck:      Thyroid: No thyroid mass, thyromegaly or thyroid tenderness.      Trachea: Trachea and phonation normal.   Cardiovascular:      Rate and Rhythm: Normal rate and regular rhythm.      Pulses: Normal pulses.           Carotid pulses are 2+ on the right side and 2+ on the left side.       Radial pulses are 2+ on the right side and 2+ on the left side.        Femoral pulses are 2+ on the right side and 2+ on the left side.       Dorsalis pedis pulses are 2+ on the right side and 2+ on the left side.        Posterior tibial pulses are 2+ on the right side and 2+ on the left side.      Heart sounds: Normal heart sounds. No murmur heard.  Pulmonary:      Effort: Pulmonary effort is normal.      Breath sounds: Normal breath sounds and air entry. No wheezing or rhonchi.   Abdominal:      General: Abdomen is flat. Bowel sounds are normal. There is no distension.      Palpations: Abdomen is soft. There is no mass.      Tenderness: There is no abdominal tenderness. There is no right CVA tenderness, left CVA tenderness, guarding or rebound.      Hernia: No hernia is present. There is no hernia in the left inguinal area or right inguinal area.   Genitourinary:     Penis: Normal and uncircumcised.       Testes: Normal. Cremasteric reflex is present.       Epididymis:      Right: Normal.      Left: Normal.      Rectum: Normal.   Musculoskeletal:         General: No tenderness. Normal range of motion.      Cervical back: Normal range of motion and neck supple. No rigidity or tenderness.      Right lower leg: No edema.      Left lower leg: No edema.   Lymphadenopathy:      Cervical: No cervical adenopathy.      Right cervical: No superficial cervical adenopathy.     Left cervical: No superficial cervical adenopathy.      Lower Body: No right inguinal adenopathy. No left inguinal adenopathy.   Skin:     General: Skin is warm.      Capillary Refill: Capillary refill takes less than 2 seconds.      Findings: No rash.   Neurological:      General: No focal deficit present.      Mental Status: He is alert and oriented to person, place, and time. Mental status is at baseline.      GCS: GCS eye subscore is 4. GCS verbal subscore is 5. GCS motor subscore is 6.      Cranial Nerves: Cranial nerves 2-12 are intact.      Sensory: Sensation is intact.      Motor: Motor function is intact.      Coordination: Coordination is intact.      Gait: Gait is intact. Gait normal.      Deep Tendon Reflexes:      Reflex Scores:       Tricep reflexes are 2+ on the right side and 2+ on the left side.       Bicep reflexes are 2+ on the right side and 2+ on the left side.       Patellar reflexes are 2+ on the right side and 2+ on the left side.  Psychiatric:         Attention and Perception: Attention and perception normal.         Mood and Affect: Mood and affect normal.         Speech: Speech normal.         Behavior: Behavior normal. Behavior is cooperative.         Thought Content: Thought content normal.         Cognition and Memory: Cognition and memory normal.         Judgment: Judgment normal.        ASSESSMENT/PLAN:  1. Wellness examination  -     Urinalysis, Reflex to Urine Culture  -     Lipid Panel  -     Hemoglobin A1C  -     HIV 1/2 Ag/Ab (4th Gen)    2. Primary hypertension  -      losartan (COZAAR) 100 MG tablet; Take 1 tablet (100 mg total) by mouth once daily.  Dispense: 90 tablet; Refill: 3  -     NIFEdipine (PROCARDIA-XL) 60 MG (OSM) 24 hr tablet; Take 1 tablet (60 mg total) by mouth once daily.  Dispense: 90 tablet; Refill: 3  -     hydrALAZINE (APRESOLINE) 25 MG tablet; Take 1 tablet (25 mg total) by mouth daily as needed (htn).  Dispense: 30 tablet; Refill: 11           RESULTS:  Recent Results (from the past 1008 hour(s))   Comprehensive Metabolic Panel    Collection Time: 04/24/23  2:12 PM   Result Value Ref Range    Sodium Level 141 136 - 145 mmol/L    Potassium Level 3.7 3.5 - 5.1 mmol/L    Chloride 108 (H) 98 - 107 mmol/L    Carbon Dioxide 25 22 - 29 mmol/L    Glucose Level 93 74 - 100 mg/dL    Blood Urea Nitrogen 8.0 (L) 8.9 - 20.6 mg/dL    Creatinine 0.93 0.73 - 1.18 mg/dL    Calcium Level Total 9.2 8.4 - 10.2 mg/dL    Protein Total 7.6 6.4 - 8.3 gm/dL    Albumin Level 3.8 3.5 - 5.0 g/dL    Globulin 3.8 (H) 2.4 - 3.5 gm/dL    Albumin/Globulin Ratio 1.0 (L) 1.1 - 2.0 ratio    Bilirubin Total 0.4 <=1.5 mg/dL    Alkaline Phosphatase 106 40 - 150 unit/L    Alanine Aminotransferase 41 0 - 55 unit/L    Aspartate Aminotransferase 42 (H) 5 - 34 unit/L    eGFR >60 mls/min/1.73/m2   CBC with Differential    Collection Time: 04/24/23  2:12 PM   Result Value Ref Range    WBC 6.2 4.5 - 11.5 x10(3)/mcL    RBC 5.24 4.70 - 6.10 x10(6)/mcL    Hgb 13.7 (L) 14.0 - 18.0 g/dL    Hct 42.7 42.0 - 52.0 %    MCV 81.5 80.0 - 94.0 fL    MCH 26.1 (L) 27.0 - 31.0 pg    MCHC 32.1 (L) 33.0 - 36.0 g/dL    RDW 16.9 11.5 - 17.0 %    Platelet 236 130 - 400 x10(3)/mcL    MPV 10.6 (H) 7.4 - 10.4 fL    Neut % 63.4 %    Lymph % 23.6 %    Mono % 10.9 %    Eos % 0.7 %    Basophil % 1.1 %    Lymph # 1.45 0.6 - 4.6 x10(3)/mcL    Neut # 3.90 2.1 - 9.2 x10(3)/mcL    Mono # 0.67 0.1 - 1.3 x10(3)/mcL    Eos # 0.04 0 - 0.9 x10(3)/mcL    Baso # 0.07 0 - 0.2 x10(3)/mcL    IG# 0.02 0 - 0.04 x10(3)/mcL    IG% 0.3 %    NRBC% 0.0  %         Follow Up:  Follow up in about 6 months (around 11/24/2023).      Previous medical history/lab work/radiology reviewed and considered during medical management decisions.   Medication list reviewed and medication reconciliation performed.  Patient was provided  and care about his/her current diagnosis (es) and medications including risk/benefit and side effects/adverse events, over the counter medication uses/doses, home self-care and contact precautions,  and red flags and indications for when to seek immediate medical attention.   Patient was advised to continue compliance with current medication list and medical recommendations.  Patient dvised continued compliance with recommended eating habits/ diets for medical conditions and exercise 150 minutes/ week (if possible) for medical condition (s).  Educational handouts and instructions on selected disease management in AVS (After Visit Summary).    All of the patient's questions were answered to patient's satisfaction.   The patient was receptive, expressed verbal understanding and agreement the above plan.         This note was created with the assistance of a voice recognition software or phone dictation. There may be transcription errors as a result of using this technology however minimal. Effort has been made to assure accuracy of transcription but any obvious errors or omissions should be clarified with the author of the document

## 2023-05-25 ENCOUNTER — TELEPHONE (OUTPATIENT)
Dept: FAMILY MEDICINE | Facility: CLINIC | Age: 43
End: 2023-05-25
Payer: MEDICAID

## 2023-05-25 NOTE — TELEPHONE ENCOUNTER
----- Message from CLAUDIA López sent at 5/24/2023  3:48 PM CDT -----  Please notify patient regarding his cholesterol within normal range.  Triglycerides slightly elevated monitor cholesterol and fat intake.  Within normal range no sign of infection.  HIV negative no sign of infection.  Hemoglobin A1c within normal range no sign of diabetes.  Return to clinic as needed as well you can reach the clinic via phone at any time.  
Called patient to give results. Patient verbalized understanding. No additional questions at this time.   
5 Hour(s) 16 Minute(s)

## 2023-06-29 DIAGNOSIS — I10 PRIMARY HYPERTENSION: ICD-10-CM

## 2023-06-29 RX ORDER — ASPIRIN 81 MG/1
81 TABLET ORAL DAILY
Qty: 30 TABLET | Refills: 11 | OUTPATIENT
Start: 2023-06-29 | End: 2024-06-28

## 2023-07-24 ENCOUNTER — DOCUMENTATION ONLY (OUTPATIENT)
Dept: INFUSION THERAPY | Facility: HOSPITAL | Age: 43
End: 2023-07-24
Payer: MEDICAID

## 2023-07-24 ENCOUNTER — OFFICE VISIT (OUTPATIENT)
Dept: RHEUMATOLOGY | Facility: CLINIC | Age: 43
End: 2023-07-24
Payer: MEDICAID

## 2023-07-24 VITALS
HEART RATE: 93 BPM | DIASTOLIC BLOOD PRESSURE: 80 MMHG | SYSTOLIC BLOOD PRESSURE: 138 MMHG | OXYGEN SATURATION: 98 % | HEIGHT: 64 IN | WEIGHT: 162.63 LBS | TEMPERATURE: 100 F | BODY MASS INDEX: 27.77 KG/M2

## 2023-07-24 DIAGNOSIS — E55.9 VITAMIN D DEFICIENCY: ICD-10-CM

## 2023-07-24 DIAGNOSIS — G47.00 INSOMNIA, UNSPECIFIED TYPE: ICD-10-CM

## 2023-07-24 DIAGNOSIS — M79.7 FIBROMYALGIA SYNDROME: ICD-10-CM

## 2023-07-24 DIAGNOSIS — D75.1 SECONDARY ERYTHROCYTOSIS: ICD-10-CM

## 2023-07-24 DIAGNOSIS — K21.9 GASTROESOPHAGEAL REFLUX DISEASE WITHOUT ESOPHAGITIS: ICD-10-CM

## 2023-07-24 DIAGNOSIS — I10 PRIMARY HYPERTENSION: ICD-10-CM

## 2023-07-24 DIAGNOSIS — M05.9 SEROPOSITIVE RHEUMATOID ARTHRITIS: ICD-10-CM

## 2023-07-24 PROBLEM — M06.00 SERONEGATIVE RHEUMATOID ARTHRITIS: Status: ACTIVE | Noted: 2022-05-23

## 2023-07-24 PROCEDURE — 99999 PR PBB SHADOW E&M-EST. PATIENT-LVL III: ICD-10-PCS | Mod: PBBFAC,,,

## 2023-07-24 PROCEDURE — 3079F PR MOST RECENT DIASTOLIC BLOOD PRESSURE 80-89 MM HG: ICD-10-PCS | Mod: CPTII,,,

## 2023-07-24 PROCEDURE — 1159F PR MEDICATION LIST DOCUMENTED IN MEDICAL RECORD: ICD-10-PCS | Mod: CPTII,,,

## 2023-07-24 PROCEDURE — 3079F DIAST BP 80-89 MM HG: CPT | Mod: CPTII,,,

## 2023-07-24 PROCEDURE — 1159F MED LIST DOCD IN RCRD: CPT | Mod: CPTII,,,

## 2023-07-24 PROCEDURE — 3044F PR MOST RECENT HEMOGLOBIN A1C LEVEL <7.0%: ICD-10-PCS | Mod: CPTII,,,

## 2023-07-24 PROCEDURE — 4010F ACE/ARB THERAPY RXD/TAKEN: CPT | Mod: CPTII,,,

## 2023-07-24 PROCEDURE — 4010F PR ACE/ARB THEARPY RXD/TAKEN: ICD-10-PCS | Mod: CPTII,,,

## 2023-07-24 PROCEDURE — 3044F HG A1C LEVEL LT 7.0%: CPT | Mod: CPTII,,,

## 2023-07-24 PROCEDURE — 3008F BODY MASS INDEX DOCD: CPT | Mod: CPTII,,,

## 2023-07-24 PROCEDURE — 3008F PR BODY MASS INDEX (BMI) DOCUMENTED: ICD-10-PCS | Mod: CPTII,,,

## 2023-07-24 PROCEDURE — 99213 OFFICE O/P EST LOW 20 MIN: CPT | Mod: PBBFAC

## 2023-07-24 PROCEDURE — 3075F SYST BP GE 130 - 139MM HG: CPT | Mod: CPTII,,,

## 2023-07-24 PROCEDURE — 99213 OFFICE O/P EST LOW 20 MIN: CPT | Mod: S$PBB,,,

## 2023-07-24 PROCEDURE — 3075F PR MOST RECENT SYSTOLIC BLOOD PRESS GE 130-139MM HG: ICD-10-PCS | Mod: CPTII,,,

## 2023-07-24 PROCEDURE — 99213 PR OFFICE/OUTPT VISIT, EST, LEVL III, 20-29 MIN: ICD-10-PCS | Mod: S$PBB,,,

## 2023-07-24 PROCEDURE — 99999 PR PBB SHADOW E&M-EST. PATIENT-LVL III: CPT | Mod: PBBFAC,,,

## 2023-07-24 RX ORDER — ASPIRIN 325 MG
50000 TABLET, DELAYED RELEASE (ENTERIC COATED) ORAL WEEKLY
Qty: 5 CAPSULE | Refills: 5 | Status: SHIPPED | OUTPATIENT
Start: 2023-07-24

## 2023-07-24 RX ORDER — HYDROXYCHLOROQUINE SULFATE 200 MG/1
200 TABLET, FILM COATED ORAL 2 TIMES DAILY
Qty: 60 TABLET | Refills: 5 | Status: SHIPPED | OUTPATIENT
Start: 2023-07-24 | End: 2024-01-24

## 2023-07-24 NOTE — PROGRESS NOTES
Patient did not show up for labs. Provider visit and possible phlebotomy. Will have patient's appointments rescheduled.      Problem: Falls - Risk of  Goal: *Absence of Falls  Description: Document Kathleen Nick Fall Risk and appropriate interventions in the flowsheet.   Outcome: Progressing Towards Goal  Note: Fall Risk Interventions:  Mobility Interventions: Bed/chair exit alarm    Mentation Interventions: Bed/chair exit alarm    Medication Interventions: Teach patient to arise slowly    Elimination Interventions: Call light in reach              Problem: Patient Education: Go to Patient Education Activity  Goal: Patient/Family Education  Outcome: Progressing Towards Goal

## 2023-07-24 NOTE — ASSESSMENT & PLAN NOTE
BP today is 138/80  Continue RX medications as prescribed  Low Sodium Diet (Dash Diet - less than 2 grams of sodium per day).  Maintain healthy weight with goal BMI <30. Exercise 30 minutes per day 5 days per week.

## 2023-07-24 NOTE — ASSESSMENT & PLAN NOTE
Continue Cholecalciferol Vitamin D3 50,000 units Q 7 days  Educated on increasing foods high in Vitamin D such as fish oil, cod liver oil, salmon, milk fortified with vitamin D.       Yesterday OV,  Current Diabetes Medications: prefers vial insulin due to cost.    Metformin XR 500mg two pills twice daily.    NPH insulin 20 units in the morning and 25 units at night.       Lispro insulin via vial before meals.  Blood Sugar Breakfast Lunch Dinner    0 0 0   101-150 0 0 0   151-200 15 15 15   201-250 18 18 18   251-300 21 21 21   301-350 24 24 24   301-350 27 27 27

## 2023-07-24 NOTE — ASSESSMENT & PLAN NOTE
Continue hydroxychloroquine 200 mg b.i.d.  Continue diclofenac 50 mg b.i.d. PRN for pain ( does not take it, does not remember the last time he took it)  Continue omeprazole 40 mg daily      Plaquenil Eye Exam: Refer to [facility] for Plaquenil Eye Exam

## 2023-07-24 NOTE — PROGRESS NOTES
"Subjective:           Patient ID: Sean Dunham is a 42 y.o. male.    Chief Complaint: Follow-up (Follow up. No complaints )      Mr. Dunham is a 43 y/o male here for a f/u. He established care with Dr Valerio on 7/28/22. Past labs: RF IgM 18 and RF IgG 8, SRIKANTH neg, CCP neg at his last visit meloxicam was discontinued and replaced with diclofenac and he was continued on the hydroxychloroquine. Followed by [facility] for erythrocytosis. He has no complaints today. 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     Today he reports  joint pain involving the MCP PIP wrist elbow shoulders hips knees and ankles bilaterally.  The pain is 2/10 in intensity dull in quality and continuous.  That is associated with a morning stiffness lasting for less than 30 minutes.  He is sleeping well and through the nigh.  This has been associated with myalgias.  Muscle aches are 3/10 in intensity dull in quality and continuous.  They are associated with fatigue.  Denies fever, chills, or recent infections.        Review of Systems   Constitutional:  Negative for appetite change, chills and fever.   HENT:  Negative for congestion, ear pain, mouth sores, nosebleeds and trouble swallowing.    Eyes:  Negative for photophobia and discharge.   Respiratory:  Negative for chest tightness and shortness of breath.    Cardiovascular:  Negative for chest pain.   Gastrointestinal:  Negative for abdominal pain and vomiting.   Endocrine: Negative.    Genitourinary:  Negative for hematuria.   Musculoskeletal:         As per HPI   Skin:  Negative for rash.   Neurological:  Negative for weakness.       Objective:   /80 (BP Location: Left arm, Patient Position: Sitting, BP Method: Medium (Automatic))   Pulse 93   Temp 99.6 °F (37.6 °C) (Oral)   Ht 5' 4" (1.626 m)   Wt 73.8 kg (162 lb 9.6 oz)   SpO2 98%   BMI 27.91 kg/m²          Physical Exam   Constitutional: He is oriented to person, place, and time. He appears well-developed and well-nourished. No " distress.   HENT:   Head: Normocephalic and atraumatic.   Right Ear: External ear normal.   Left Ear: External ear normal.   Eyes: Pupils are equal, round, and reactive to light.   Cardiovascular: Normal rate.   Pulmonary/Chest: Effort normal.   Abdominal: Soft. There is no abdominal tenderness.   Musculoskeletal:      Right elbow: Normal.      Left elbow: Normal.      Right wrist: Normal.      Left wrist: Normal.      Cervical back: Neck supple.      Right hip: Normal.      Left hip: Normal.      Right knee: Normal.      Left knee: Normal.   Lymphadenopathy:     He has no cervical adenopathy.   Neurological: He is alert and oriented to person, place, and time. He displays normal reflexes. No cranial nerve deficit or sensory deficit. He exhibits normal muscle tone. Coordination normal.   Skin: No rash noted. No erythema.   Vitals reviewed.      Right Side Rheumatological Exam     Examination finds the elbow, wrist, knee, 1st PIP, 1st MCP, 2nd PIP, 2nd MCP, 3rd PIP, 3rd MCP, 4th PIP, 4th MCP, 5th PIP, 5th MCP, hip, ankle, 1st MTP, 2nd MTP, 3rd MTP, 4th MTP and 5th MTP normal.    Left Side Rheumatological Exam     Examination finds the elbow, wrist, knee, 1st PIP, 1st MCP, 2nd PIP, 2nd MCP, 3rd PIP, 3rd MCP, 4th PIP, 4th MCP, 5th PIP, 5th MCP, hip, ankle, 1st MTP, 2nd MTP, 3rd MTP, 4th MTP and 5th MTP normal.         Completed Fibromyalgia exam 18/18 tender points.    No data to display     Assessment:         Medication List with Changes/Refills   Current Medications    ASPIRIN (ECOTRIN) 81 MG EC TABLET    Take 1 tablet (81 mg total) by mouth once daily.       Start Date: 5/23/2022 End Date: 7/24/2023    DICLOFENAC (VOLTAREN) 50 MG EC TABLET    Take 1 tablet (50 mg total) by mouth 2 (two) times daily as needed (pain, after food).       Start Date: 3/20/2023 End Date: --    HYDRALAZINE (APRESOLINE) 25 MG TABLET    Take 1 tablet (25 mg total) by mouth daily as needed (htn).       Start Date: 5/24/2023 End Date:  2024    HYDROXYZINE PAMOATE (VISTARIL) 25 MG CAP    Take 25 mg by mouth 3 (three) times daily.       Start Date: 2022 End Date: --    LOSARTAN (COZAAR) 100 MG TABLET    Take 1 tablet (100 mg total) by mouth once daily.       Start Date: 2023 End Date: --    MUPIROCIN (BACTROBAN) 2 % OINTMENT    SMARTSI Application Topical 2-3 Times Daily       Start Date: 2022 End Date: --    NIFEDIPINE (PROCARDIA-XL) 60 MG (OSM) 24 HR TABLET    Take 1 tablet (60 mg total) by mouth once daily.       Start Date: 2023 End Date: 2024    OMEPRAZOLE (PRILOSEC) 40 MG CAPSULE    Take 1 capsule (40 mg total) by mouth once daily. Before lunch       Start Date: 3/20/2023 End Date: 3/19/2024    ZOLPIDEM (AMBIEN) 10 MG TAB    Take 1 tablet (10 mg total) by mouth every evening.       Start Date: 3/20/2023 End Date: 2023   Changed and/or Refilled Medications    Modified Medication Previous Medication    CHOLECALCIFEROL, VITAMIN D3, 1,250 MCG (50,000 UNIT) CAPSULE cholecalciferol, vitamin D3, 1,250 mcg (50,000 unit) capsule       Take 1 capsule (50,000 Units total) by mouth once a week.    Take 1 capsule (50,000 Units total) by mouth once a week.       Start Date: 2023 End Date: --    Start Date: 3/20/2023 End Date: 2023    HYDROXYCHLOROQUINE (PLAQUENIL) 200 MG TABLET hydrOXYchloroQUINE (PLAQUENIL) 200 mg tablet       Take 1 tablet (200 mg total) by mouth 2 (two) times daily. After food    Take 1 tablet (200 mg total) by mouth 2 (two) times daily. After food       Start Date: 2023 End Date: 2024    Start Date: 3/20/2023 End Date: 2023         ICD-10-CM ICD-9-CM   1. Seropositive rheumatoid arthritis  M05.9 714.0   2. Vitamin D deficiency  E55.9 268.9   3. Insomnia, unspecified type  G47.00 780.52   4. Primary hypertension  I10 401.9   5. Fibromyalgia syndrome  M79.7 729.1   6. Gastroesophageal reflux disease without esophagitis  K21.9 530.81   7. Secondary erythrocytosis  D75.1 289.0            Plan:       1. Seropositive rheumatoid arthritis  Assessment & Plan:  Continue hydroxychloroquine 200 mg b.i.d.  Continue diclofenac 50 mg b.i.d. PRN for pain ( does not take it, does not remember the last time he took it)  Continue omeprazole 40 mg daily      Plaquenil Eye Exam: Refer to [facility] for Plaquenil Eye Exam      Orders:  -     hydrOXYchloroQUINE (PLAQUENIL) 200 mg tablet; Take 1 tablet (200 mg total) by mouth 2 (two) times daily. After food  Dispense: 60 tablet; Refill: 5    2. Vitamin D deficiency  Assessment & Plan:    Continue Cholecalciferol Vitamin D3 50,000 units Q 7 days  Educated on increasing foods high in Vitamin D such as fish oil, cod liver oil, salmon, milk fortified with vitamin D.        Orders:  -     cholecalciferol, vitamin D3, 1,250 mcg (50,000 unit) capsule; Take 1 capsule (50,000 Units total) by mouth once a week.  Dispense: 5 capsule; Refill: 5    3. Insomnia, unspecified type  Assessment & Plan:  Continue Ambien 10 mg nightly  Avoid caffeine, alcohol and stimulants.  Power down electronic devices at least one hour prior to bedtime.  Keep room dark; use eye mask or relaxation sound machine to promote rest.  Sleep hygiene refers to actions that tend to improve and maintain good sleep:  Sleep as long as necessary to feel rested (usually seven to eight hours for adults) and then get out of bed  Maintain a regular sleep schedule, particularly a regular wake-up time in the morning  Avoid smoking or other nicotine intake, particularly during the evening          4. Primary hypertension  Assessment & Plan:  BP today is 138/80  Continue RX medications as prescribed  Low Sodium Diet (Dash Diet - less than 2 grams of sodium per day).  Maintain healthy weight with goal BMI <30. Exercise 30 minutes per day 5 days per week.        5. Fibromyalgia syndrome    6. Gastroesophageal reflux disease without esophagitis  Assessment & Plan:  Currently on Omeprazole 40 mg daily      7.  Secondary erythrocytosis  Assessment & Plan:  Followed by [facility]

## 2023-07-25 NOTE — PROGRESS NOTES
Patient called the clinic today requesting his lab work results from the ER. Patient states that he is not symptomatic at this time, However he stated that if he needs therap phleb then he wants to receive it. Patient will be scheduled for therap phleb due to HCT being 46.5.

## 2023-07-31 ENCOUNTER — INFUSION (OUTPATIENT)
Dept: INFUSION THERAPY | Facility: HOSPITAL | Age: 43
End: 2023-07-31
Attending: INTERNAL MEDICINE
Payer: MEDICAID

## 2023-07-31 ENCOUNTER — CLINICAL SUPPORT (OUTPATIENT)
Dept: HEMATOLOGY/ONCOLOGY | Facility: CLINIC | Age: 43
End: 2023-07-31
Payer: MEDICAID

## 2023-07-31 VITALS
OXYGEN SATURATION: 98 % | RESPIRATION RATE: 18 BRPM | BODY MASS INDEX: 27.21 KG/M2 | WEIGHT: 159.38 LBS | TEMPERATURE: 99 F | HEIGHT: 64 IN | HEART RATE: 91 BPM | DIASTOLIC BLOOD PRESSURE: 79 MMHG | SYSTOLIC BLOOD PRESSURE: 123 MMHG

## 2023-07-31 DIAGNOSIS — D75.1 ERYTHROCYTOSIS: ICD-10-CM

## 2023-07-31 LAB
ALBUMIN SERPL-MCNC: 3.5 G/DL (ref 3.5–5)
ALBUMIN/GLOB SERPL: 0.9 RATIO (ref 1.1–2)
ALP SERPL-CCNC: 98 UNIT/L (ref 40–150)
ALT SERPL-CCNC: 37 UNIT/L (ref 0–55)
AST SERPL-CCNC: 50 UNIT/L (ref 5–34)
BASOPHILS # BLD AUTO: 0.04 X10(3)/MCL
BASOPHILS NFR BLD AUTO: 0.9 %
BILIRUBIN DIRECT+TOT PNL SERPL-MCNC: 0.2 MG/DL
BUN SERPL-MCNC: 6.6 MG/DL (ref 8.9–20.6)
CALCIUM SERPL-MCNC: 8.8 MG/DL (ref 8.4–10.2)
CHLORIDE SERPL-SCNC: 107 MMOL/L (ref 98–107)
CO2 SERPL-SCNC: 20 MMOL/L (ref 22–29)
CREAT SERPL-MCNC: 0.92 MG/DL (ref 0.73–1.18)
EOSINOPHIL # BLD AUTO: 0.02 X10(3)/MCL (ref 0–0.9)
EOSINOPHIL NFR BLD AUTO: 0.4 %
ERYTHROCYTE [DISTWIDTH] IN BLOOD BY AUTOMATED COUNT: 16.7 % (ref 11.5–17)
GFR SERPLBLD CREATININE-BSD FMLA CKD-EPI: >60 MLS/MIN/1.73/M2
GLOBULIN SER-MCNC: 4.1 GM/DL (ref 2.4–3.5)
GLUCOSE SERPL-MCNC: 98 MG/DL (ref 74–100)
HCT VFR BLD AUTO: 47.6 % (ref 42–52)
HGB BLD-MCNC: 15.1 G/DL (ref 14–18)
IMM GRANULOCYTES # BLD AUTO: 0.01 X10(3)/MCL (ref 0–0.04)
IMM GRANULOCYTES NFR BLD AUTO: 0.2 %
LYMPHOCYTES # BLD AUTO: 0.97 X10(3)/MCL (ref 0.6–4.6)
LYMPHOCYTES NFR BLD AUTO: 21.5 %
MCH RBC QN AUTO: 26.1 PG (ref 27–31)
MCHC RBC AUTO-ENTMCNC: 31.7 G/DL (ref 33–36)
MCV RBC AUTO: 82.2 FL (ref 80–94)
MONOCYTES # BLD AUTO: 0.75 X10(3)/MCL (ref 0.1–1.3)
MONOCYTES NFR BLD AUTO: 16.6 %
NEUTROPHILS # BLD AUTO: 2.72 X10(3)/MCL (ref 2.1–9.2)
NEUTROPHILS NFR BLD AUTO: 60.4 %
NRBC BLD AUTO-RTO: 0 %
PLATELET # BLD AUTO: 221 X10(3)/MCL (ref 130–400)
PMV BLD AUTO: 10.5 FL (ref 7.4–10.4)
POTASSIUM SERPL-SCNC: 3.7 MMOL/L (ref 3.5–5.1)
PROT SERPL-MCNC: 7.6 GM/DL (ref 6.4–8.3)
RBC # BLD AUTO: 5.79 X10(6)/MCL (ref 4.7–6.1)
SODIUM SERPL-SCNC: 142 MMOL/L (ref 136–145)
WBC # SPEC AUTO: 4.51 X10(3)/MCL (ref 4.5–11.5)

## 2023-07-31 PROCEDURE — 85025 COMPLETE CBC W/AUTO DIFF WBC: CPT

## 2023-07-31 PROCEDURE — 99195 PHLEBOTOMY: CPT

## 2023-07-31 PROCEDURE — 36415 COLL VENOUS BLD VENIPUNCTURE: CPT

## 2023-07-31 PROCEDURE — 80053 COMPREHEN METABOLIC PANEL: CPT

## 2023-08-28 PROBLEM — Z00.00 WELLNESS EXAMINATION: Status: RESOLVED | Noted: 2023-05-24 | Resolved: 2023-08-28

## 2023-09-08 DIAGNOSIS — D75.1 SECONDARY POLYCYTHEMIA: Primary | ICD-10-CM

## 2023-09-11 ENCOUNTER — OFFICE VISIT (OUTPATIENT)
Dept: HEMATOLOGY/ONCOLOGY | Facility: CLINIC | Age: 43
End: 2023-09-11
Attending: INTERNAL MEDICINE
Payer: MEDICAID

## 2023-09-11 ENCOUNTER — INFUSION (OUTPATIENT)
Dept: INFUSION THERAPY | Facility: HOSPITAL | Age: 43
End: 2023-09-11
Attending: INTERNAL MEDICINE
Payer: MEDICAID

## 2023-09-11 VITALS
BODY MASS INDEX: 27.69 KG/M2 | TEMPERATURE: 99 F | HEIGHT: 64 IN | RESPIRATION RATE: 18 BRPM | SYSTOLIC BLOOD PRESSURE: 138 MMHG | OXYGEN SATURATION: 99 % | WEIGHT: 162.19 LBS | HEART RATE: 86 BPM | DIASTOLIC BLOOD PRESSURE: 83 MMHG

## 2023-09-11 DIAGNOSIS — I10 PRIMARY HYPERTENSION: ICD-10-CM

## 2023-09-11 DIAGNOSIS — D75.1 SECONDARY POLYCYTHEMIA: Primary | ICD-10-CM

## 2023-09-11 DIAGNOSIS — D75.1 SECONDARY ERYTHROCYTOSIS: Primary | ICD-10-CM

## 2023-09-11 PROCEDURE — 99213 OFFICE O/P EST LOW 20 MIN: CPT | Mod: S$PBB,,, | Performed by: INTERNAL MEDICINE

## 2023-09-11 PROCEDURE — 3075F PR MOST RECENT SYSTOLIC BLOOD PRESS GE 130-139MM HG: ICD-10-PCS | Mod: CPTII,,, | Performed by: INTERNAL MEDICINE

## 2023-09-11 PROCEDURE — 4010F ACE/ARB THERAPY RXD/TAKEN: CPT | Mod: CPTII,,, | Performed by: INTERNAL MEDICINE

## 2023-09-11 PROCEDURE — 3008F BODY MASS INDEX DOCD: CPT | Mod: CPTII,,, | Performed by: INTERNAL MEDICINE

## 2023-09-11 PROCEDURE — 99214 OFFICE O/P EST MOD 30 MIN: CPT | Mod: PBBFAC | Performed by: INTERNAL MEDICINE

## 2023-09-11 PROCEDURE — 1159F MED LIST DOCD IN RCRD: CPT | Mod: CPTII,,, | Performed by: INTERNAL MEDICINE

## 2023-09-11 PROCEDURE — 3044F PR MOST RECENT HEMOGLOBIN A1C LEVEL <7.0%: ICD-10-PCS | Mod: CPTII,,, | Performed by: INTERNAL MEDICINE

## 2023-09-11 PROCEDURE — 3075F SYST BP GE 130 - 139MM HG: CPT | Mod: CPTII,,, | Performed by: INTERNAL MEDICINE

## 2023-09-11 PROCEDURE — 99213 PR OFFICE/OUTPT VISIT, EST, LEVL III, 20-29 MIN: ICD-10-PCS | Mod: S$PBB,,, | Performed by: INTERNAL MEDICINE

## 2023-09-11 PROCEDURE — 3008F PR BODY MASS INDEX (BMI) DOCUMENTED: ICD-10-PCS | Mod: CPTII,,, | Performed by: INTERNAL MEDICINE

## 2023-09-11 PROCEDURE — 1159F PR MEDICATION LIST DOCUMENTED IN MEDICAL RECORD: ICD-10-PCS | Mod: CPTII,,, | Performed by: INTERNAL MEDICINE

## 2023-09-11 PROCEDURE — 1160F RVW MEDS BY RX/DR IN RCRD: CPT | Mod: CPTII,,, | Performed by: INTERNAL MEDICINE

## 2023-09-11 PROCEDURE — 1160F PR REVIEW ALL MEDS BY PRESCRIBER/CLIN PHARMACIST DOCUMENTED: ICD-10-PCS | Mod: CPTII,,, | Performed by: INTERNAL MEDICINE

## 2023-09-11 PROCEDURE — 4010F PR ACE/ARB THEARPY RXD/TAKEN: ICD-10-PCS | Mod: CPTII,,, | Performed by: INTERNAL MEDICINE

## 2023-09-11 PROCEDURE — 3044F HG A1C LEVEL LT 7.0%: CPT | Mod: CPTII,,, | Performed by: INTERNAL MEDICINE

## 2023-09-11 PROCEDURE — 3079F PR MOST RECENT DIASTOLIC BLOOD PRESSURE 80-89 MM HG: ICD-10-PCS | Mod: CPTII,,, | Performed by: INTERNAL MEDICINE

## 2023-09-11 PROCEDURE — 3079F DIAST BP 80-89 MM HG: CPT | Mod: CPTII,,, | Performed by: INTERNAL MEDICINE

## 2023-09-11 NOTE — NURSING
16:54 Scheduled for possible phlebotomy had labs (H&H 13.8/45.1) and provider visit with Dr Santiago determined that Phlebotomy would not be needed today. Have return lab & provider visit scheduled for 11/9/23.

## 2023-09-11 NOTE — PROGRESS NOTES
History:  Past Medical History:   Diagnosis Date    Acid reflux     Anxiety     Hypertension    Past medical history:  Hypertension.  Tobacco abuse.       History reviewed. No pertinent surgical history.   Social History     Socioeconomic History    Marital status: Single   Tobacco Use    Smoking status: Some Days     Types: Vaping with nicotine    Smokeless tobacco: Never   Substance and Sexual Activity    Alcohol use: Yes     Alcohol/week: 4.0 standard drinks of alcohol     Types: 1 Glasses of wine, 3 Standard drinks or equivalent per week     Comment: Daily    Drug use: Never    Sexual activity: Yes     Partners: Female     Social Determinants of Health     Financial Resource Strain: Low Risk  (5/24/2023)    Overall Financial Resource Strain (CARDIA)     Difficulty of Paying Living Expenses: Not hard at all   Food Insecurity: No Food Insecurity (5/24/2023)    Hunger Vital Sign     Worried About Running Out of Food in the Last Year: Never true     Ran Out of Food in the Last Year: Never true   Transportation Needs: No Transportation Needs (5/24/2023)    PRAPARE - Transportation     Lack of Transportation (Medical): No     Lack of Transportation (Non-Medical): No   Physical Activity: Insufficiently Active (5/24/2023)    Exercise Vital Sign     Days of Exercise per Week: 1 day     Minutes of Exercise per Session: 20 min   Stress: No Stress Concern Present (5/24/2023)    [employer] [employer] - Occupational Stress Questionnaire     Feeling of Stress : Only a little   Social Connections: Moderately Integrated (5/24/2023)    Social Connection and Isolation Panel [NHANES]     Frequency of Communication with Friends and Family: More than three times a week     Frequency of Social Gatherings with Friends and Family: Three times a week     Attends Taoism Services: 1 to 4 times per year     Active Member of Clubs or Organizations: Yes     Attends Club or Organization Meetings: Never     Marital Status:  Never    Housing Stability: Low Risk  (5/24/2023)    Housing Stability Vital Sign     Unable to Pay for Housing in the Last Year: No     Number of Places Lived in the Last Year: 1     Unstable Housing in the Last Year: No      Family History   Problem Relation Age of Onset    No Known Problems Mother     No Known Problems Father       Reason for Follow-up:  Secondary erythrocytosis    Tobacco use     History of Present Illness:   secondary polycythemia    Oncologic/Hematologic History:  Oncology History    No history exists.   # secondary erythrocytosis:  Severe erythrocytosis, hemoglobin 24, diagnosed in this generally healthy middle-aged gentleman,   about 17-pack-year smoker but with no history of cardiopulmonary problems or dyspnea.   Erythropoietin level is not suppressed; this is a point against polycythemia vera.    Bone marrow examination does not suggest polycythemia vera.    JANESSA-2 mutation negative (both JANESSA-2 V617F, as well as exons 12-15 are negative).  CALR mutation negative in bone marrow.  >> Polycythemia vera ruled out   (erythropoietin level not suppressed; JANESSA mutation negative)  Patient has some kind of secondary erythrocytosis   -Phlebotomized: 11/20/2017; 12/11/2017; 01/02/2018; 03/12/2019 (H/H 18.6/51.4); 07/15/2019 (H/H 18.0/52.2); 10/14/2019 (H/H 18.1/53.6)   -H/H: 16.2/48.8 (12/09/2019); 16.2/48.8 (11/11/2019; 16.0/48.1 (10/21/2019); 18.1/53.6 (10/08/2019), etc.   -After last therapeutic phlebotomy on 10/14/2019 (H/H 18.1/23.6), his H/H has remained stable, 16.6/49.9 as of 02/10/2020  -Therapeutic phlebotomy 605 cc on 04/21/2020 (H/H 17.7/52.2)   -07/06/2020: H/H 17.4/51.6  -Therapeutic phlebotomy 07/09/2020 (H/H 17.4/51.6), 07/20/2020 (H/H 15.3/45.3), 07/27/2020 (H/H 14.8/45.2)   -08/03/2020: H/H 14.0/42.2 (phlebotomy held)   -08/11/2020: H/H 13.9/41.4 (phlebotomy held)  -09/14/2020: CBC reviewed.  H/H 14.6/44.1.  -Since 09/15/2020: Therapeutic phlebotomy: 12/14/2020, 04/19/2021,  08/16/2021, 11/22/2021, 03/21/2022, 04/18/2022        -11/28/2022:  H/H 13.9/45.0   -S/P therapeutic phlebotomy 11/28/2022, per patient preference (534 mL phlebotomized)  -01/03/2023:  H/H 13.1/43.7    Interval History:  THERAPEUTIC PHLEBOTOMY   [No matching plan found]   04/24/2023:   -follows up with Rheumatology for seropositive rheumatoid arthritis, vitamin-D deficiency, fibromyalgia  -also, generalized anxiety disorder  -04/24/2023:  Hemoglobin 13.7.  Hematocrit 42.7.  Presents for follow-up visit.  Doing well.  No symptoms of hyperviscosity state.  No headaches, weakness, fatigue, malaise, etc..    09/11/2023:  -no showed 07/24/2023  -07/31/2023:  Hemoglobin 15.1.  Hematocrit 47.6; phlebotomized 505 cc  -follows up with Rheumatology for arthralgias/arthritis MCP, PIP, wrist, elbow, shoulders, hips, knees, and ankles bilaterally; muscle aches; fatigue; seropositive rheumatoid arthritis; fibromyalgia syndrome; on hydroxychloroquine, diclofenac, iron omeprazole  -09/11/2023:  Hemoglobin 13.8.  Hematocrit 45.1.  Presents for a follow-up visit.  No complaints.  Doing well.  Says that arthritis is well controlled with the medications that Rheumatology is giving him.      Medications:  Current Outpatient Medications on File Prior to Visit   Medication Sig Dispense Refill    cholecalciferol, vitamin D3, 1,250 mcg (50,000 unit) capsule Take 1 capsule (50,000 Units total) by mouth once a week. 5 capsule 5    diclofenac (VOLTAREN) 50 MG EC tablet Take 1 tablet (50 mg total) by mouth 2 (two) times daily as needed (pain, after food). 60 tablet 5    hydrALAZINE (APRESOLINE) 25 MG tablet Take 1 tablet (25 mg total) by mouth daily as needed (htn). 30 tablet 11    hydrOXYchloroQUINE (PLAQUENIL) 200 mg tablet Take 1 tablet (200 mg total) by mouth 2 (two) times daily. After food 60 tablet 5    hydrOXYzine pamoate (VISTARIL) 25 MG Cap Take 25 mg by mouth 3 (three) times daily.      losartan (COZAAR) 100 MG tablet Take 1 tablet  "(100 mg total) by mouth once daily. 90 tablet 3    mupirocin (BACTROBAN) 2 % ointment SMARTSI Application Topical 2-3 Times Daily      omeprazole (PRILOSEC) 40 MG capsule Take 1 capsule (40 mg total) by mouth once daily. Before lunch 30 capsule 11    zolpidem (AMBIEN) 10 mg Tab Take 1 tablet (10 mg total) by mouth every evening. 30 tablet 5    aspirin (ECOTRIN) 81 MG EC tablet Take 1 tablet (81 mg total) by mouth once daily. 30 tablet 11    NIFEdipine (PROCARDIA-XL) 60 MG (OSM) 24 hr tablet Take 1 tablet (60 mg total) by mouth once daily. (Patient not taking: Reported on 2023) 90 tablet 3     No current facility-administered medications on file prior to visit.       Review of Systems:   All systems reviewed and found to be negative except for the symptoms detailed above    Physical Examination:   VITAL SIGNS:   Vitals:    23 1340   BP: 138/83   Pulse: 86   Resp: 18   Temp: 99 °F (37.2 °C)         GENERAL:  In no apparent distress.    HEAD:  No signs of head trauma.  EYES:  Pupils are equal.  Extraocular motions intact.    EARS:  Hearing grossly intact.  MOUTH:  Oropharynx is normal.   NECK:  No adenopathy, no JVD.     CHEST:  Chest with clear breath sounds bilaterally.  No wheezes, rales, rhonchi.    CARDIAC:  Regular rate and rhythm.  S1 and S2, without murmurs, gallops, rubs.  VASCULAR:  No Edema.  Peripheral pulses normal and equal in all extremities.  ABDOMEN:  Soft, without detectable tenderness.  No sign of distention.  No   rebound or guarding, and no masses palpated.   Bowel Sounds normal.  MUSCULOSKELETAL:  Good range of motion of all major joints. Extremities without clubbing, cyanosis or edema.    NEUROLOGIC EXAM:  Alert and oriented x 3.  No focal sensory or strength deficits.   Speech normal.  Follows commands.  PSYCHIATRIC:  Mood normal.    No results for input(s): "CBC" in the last 72 hours.   No results for input(s): "CMP" in the last 72 hours.     Assessment:  Problem List Items " Addressed This Visit    None      Secondary erythrocytosis:  Presented with hemoglobin 24, completely asymptomatic  Smoking history but no dyspnea  JAK2 mutation negative.  EPO level not suppressed  >> thus, polycythemia vera ruled out  However, has behaved more or less like polycythemia vera (persistently elevated H/H), requiring periodic therapeutic phlebotomy treatments (but no history of blood clots, aquagenic pruritus, or any symptoms of hyperviscosity state)  Initiated therapeutic phlebotomy treatments in view of severity of hemoglobin elevation  Empirically, target hematocrit <45  -01/30/2023:  H/H 13.0/41.5  -04/24/2023:  Hemoglobin 13.7.  Hematocrit 42.7.  -07/31/2023:  Hemoglobin 15.1.  Hematocrit 47.6; phlebotomized 505 cc  -09/11/2023:  Hemoglobin 13.8.  Hematocrit 45.1.        Plan:   -No evidence of polycythemia vera   -Has secondary polycythemia   -No symptoms of hyperviscosity state   -However, has behaved more or less like polycythemia vera (persistently elevated H/H), requiring periodic therapeutic phlebotomy treatments (however, no symptoms of hyperviscosity state, blood clots, aquagenic pruritus, erythromelalgia, etc.)  -01/30/2023:  H/H 13.0/41.5  -04/24/2023:  Hemoglobin 13.7.  Hematocrit 42.7.  -07/31/2023:  Hemoglobin 15.1.  Hematocrit 47.6; phlebotomized 505 cc  -09/11/2023:  Hemoglobin 13.8.  Hematocrit 45.1.  >>>  Empirically, therapeutic phlebotomy p.r.n. to keep hematocrit =/<45  Hold phlebotomy if hematocrit <45  In view of history of hypertension and tobacco abuse, reasonable to continue baby aspirin 81 mg p.o. daily (in the absence of cardiovascular risk factors, baby aspirin is not indicated in secondary polycythemia)  Today, no need of phlebotomy      -He has quit smoking    -follows up with Rheumatology for arthralgias/arthritis MCP, PIP, wrist, elbow, shoulders, hips, knees, and ankles bilaterally; muscle aches; fatigue; seropositive rheumatoid arthritis; fibromyalgia syndrome; on  hydroxychloroquine, diclofenac, iron omeprazole    Follow-up in 2 months, with CBC and CMP.    Above discussed with him.  All questions answered.    Discussed labs and gave him copies of relevant records.    He understands and agrees with this plan.   ----------------        Discussion:  He has secondary erythrocytosis of unclear etiology.    No evidence of polycythemia vera (normal erythropoietin level and negative JANESSA-2 mutation rule out polycythemia vera).  However, in terms of persistence of elevated H/H, has behaved more or less like polycythemia vera.  No dyspnea or hypoxia  Had no symptoms of hyperviscosity state at any time despite severe elevation of hemoglobin.  Regardless, to be on the safer side, will continue therapeutic phlebotomy as needed.  He is proud that he has quit smoking altogether on my advice.       Follow-up:  No follow-ups on file.

## 2023-10-17 DIAGNOSIS — F41.1 GENERALIZED ANXIETY DISORDER: ICD-10-CM

## 2023-10-17 DIAGNOSIS — M79.7 FIBROMYALGIA SYNDROME: ICD-10-CM

## 2023-10-17 DIAGNOSIS — E55.9 VITAMIN D DEFICIENCY: ICD-10-CM

## 2023-10-17 DIAGNOSIS — M05.9 SEROPOSITIVE RHEUMATOID ARTHRITIS: ICD-10-CM

## 2023-10-17 DIAGNOSIS — F41.1 GAD (GENERALIZED ANXIETY DISORDER): ICD-10-CM

## 2023-10-17 DIAGNOSIS — K21.9 GASTROESOPHAGEAL REFLUX DISEASE WITHOUT ESOPHAGITIS: ICD-10-CM

## 2023-10-19 RX ORDER — ZOLPIDEM TARTRATE 10 MG/1
10 TABLET ORAL NIGHTLY
Qty: 30 TABLET | Refills: 5 | Status: SHIPPED | OUTPATIENT
Start: 2023-10-19 | End: 2024-04-16

## 2023-11-20 ENCOUNTER — INFUSION (OUTPATIENT)
Dept: INFUSION THERAPY | Facility: HOSPITAL | Age: 43
End: 2023-11-20
Attending: INTERNAL MEDICINE

## 2023-11-20 ENCOUNTER — APPOINTMENT (OUTPATIENT)
Dept: HEMATOLOGY/ONCOLOGY | Facility: CLINIC | Age: 43
End: 2023-11-20

## 2023-11-20 ENCOUNTER — OFFICE VISIT (OUTPATIENT)
Dept: HEMATOLOGY/ONCOLOGY | Facility: CLINIC | Age: 43
End: 2023-11-20
Attending: INTERNAL MEDICINE

## 2023-11-20 VITALS
SYSTOLIC BLOOD PRESSURE: 121 MMHG | OXYGEN SATURATION: 100 % | TEMPERATURE: 99 F | DIASTOLIC BLOOD PRESSURE: 86 MMHG | BODY MASS INDEX: 26.16 KG/M2 | WEIGHT: 157 LBS | RESPIRATION RATE: 18 BRPM | HEART RATE: 87 BPM | HEIGHT: 65 IN

## 2023-11-20 VITALS
TEMPERATURE: 98 F | HEART RATE: 73 BPM | SYSTOLIC BLOOD PRESSURE: 134 MMHG | DIASTOLIC BLOOD PRESSURE: 91 MMHG | RESPIRATION RATE: 20 BRPM | OXYGEN SATURATION: 100 %

## 2023-11-20 DIAGNOSIS — Z72.0 TOBACCO ABUSE: ICD-10-CM

## 2023-11-20 DIAGNOSIS — D75.1 SECONDARY POLYCYTHEMIA: ICD-10-CM

## 2023-11-20 DIAGNOSIS — I10 PRIMARY HYPERTENSION: ICD-10-CM

## 2023-11-20 DIAGNOSIS — D75.1 SECONDARY ERYTHROCYTOSIS: Primary | ICD-10-CM

## 2023-11-20 LAB
ALBUMIN SERPL-MCNC: 3.4 G/DL (ref 3.5–5)
ALBUMIN/GLOB SERPL: 0.8 RATIO (ref 1.1–2)
ALP SERPL-CCNC: 73 UNIT/L (ref 40–150)
ALT SERPL-CCNC: 18 UNIT/L (ref 0–55)
AST SERPL-CCNC: 20 UNIT/L (ref 5–34)
BASOPHILS # BLD AUTO: 0.03 X10(3)/MCL
BASOPHILS NFR BLD AUTO: 0.6 %
BILIRUB SERPL-MCNC: 0.4 MG/DL
BUN SERPL-MCNC: 6.4 MG/DL (ref 8.9–20.6)
CALCIUM SERPL-MCNC: 9.5 MG/DL (ref 8.4–10.2)
CHLORIDE SERPL-SCNC: 105 MMOL/L (ref 98–107)
CO2 SERPL-SCNC: 22 MMOL/L (ref 22–29)
CREAT SERPL-MCNC: 0.89 MG/DL (ref 0.73–1.18)
EOSINOPHIL # BLD AUTO: 0.04 X10(3)/MCL (ref 0–0.9)
EOSINOPHIL NFR BLD AUTO: 0.9 %
ERYTHROCYTE [DISTWIDTH] IN BLOOD BY AUTOMATED COUNT: 15.7 % (ref 11.5–17)
GFR SERPLBLD CREATININE-BSD FMLA CKD-EPI: >60 MLS/MIN/1.73/M2
GLOBULIN SER-MCNC: 4.5 GM/DL (ref 2.4–3.5)
GLUCOSE SERPL-MCNC: 87 MG/DL (ref 74–100)
HCT VFR BLD AUTO: 46.3 % (ref 42–52)
HGB BLD-MCNC: 14.9 G/DL (ref 14–18)
IMM GRANULOCYTES # BLD AUTO: 0.01 X10(3)/MCL (ref 0–0.04)
IMM GRANULOCYTES NFR BLD AUTO: 0.2 %
LYMPHOCYTES # BLD AUTO: 1.18 X10(3)/MCL (ref 0.6–4.6)
LYMPHOCYTES NFR BLD AUTO: 25.4 %
MCH RBC QN AUTO: 26.1 PG (ref 27–31)
MCHC RBC AUTO-ENTMCNC: 32.2 G/DL (ref 33–36)
MCV RBC AUTO: 81.1 FL (ref 80–94)
MONOCYTES # BLD AUTO: 0.36 X10(3)/MCL (ref 0.1–1.3)
MONOCYTES NFR BLD AUTO: 7.8 %
NEUTROPHILS # BLD AUTO: 3.02 X10(3)/MCL (ref 2.1–9.2)
NEUTROPHILS NFR BLD AUTO: 65.1 %
NRBC BLD AUTO-RTO: 0 %
PLATELET # BLD AUTO: 301 X10(3)/MCL (ref 130–400)
PMV BLD AUTO: 10.4 FL (ref 7.4–10.4)
POTASSIUM SERPL-SCNC: 4.1 MMOL/L (ref 3.5–5.1)
PROT SERPL-MCNC: 7.9 GM/DL (ref 6.4–8.3)
RBC # BLD AUTO: 5.71 X10(6)/MCL (ref 4.7–6.1)
SODIUM SERPL-SCNC: 137 MMOL/L (ref 136–145)
WBC # SPEC AUTO: 4.64 X10(3)/MCL (ref 4.5–11.5)

## 2023-11-20 PROCEDURE — 99215 OFFICE O/P EST HI 40 MIN: CPT | Mod: PBBFAC | Performed by: INTERNAL MEDICINE

## 2023-11-20 PROCEDURE — 36415 COLL VENOUS BLD VENIPUNCTURE: CPT

## 2023-11-20 PROCEDURE — 99213 PR OFFICE/OUTPT VISIT, EST, LEVL III, 20-29 MIN: ICD-10-PCS | Mod: S$PBB,,, | Performed by: INTERNAL MEDICINE

## 2023-11-20 PROCEDURE — 85025 COMPLETE CBC W/AUTO DIFF WBC: CPT

## 2023-11-20 PROCEDURE — 99195 PHLEBOTOMY: CPT

## 2023-11-20 PROCEDURE — 80053 COMPREHEN METABOLIC PANEL: CPT

## 2023-11-20 PROCEDURE — 99213 OFFICE O/P EST LOW 20 MIN: CPT | Mod: S$PBB,,, | Performed by: INTERNAL MEDICINE

## 2023-11-20 RX ORDER — LIDOCAINE HYDROCHLORIDE 10 MG/ML
1 INJECTION, SOLUTION EPIDURAL; INFILTRATION; INTRACAUDAL; PERINEURAL ONCE
OUTPATIENT
Start: 2023-11-27 | End: 2023-11-27

## 2023-11-20 RX ORDER — LIDOCAINE HYDROCHLORIDE 10 MG/ML
1 INJECTION, SOLUTION EPIDURAL; INFILTRATION; INTRACAUDAL; PERINEURAL ONCE
Status: DISCONTINUED | OUTPATIENT
Start: 2023-11-20 | End: 2023-11-20 | Stop reason: HOSPADM

## 2023-11-20 NOTE — PROGRESS NOTES
History:  Past Medical History:   Diagnosis Date    Acid reflux     Anxiety     Hypertension    Past medical history:  Hypertension.  Tobacco abuse.       History reviewed. No pertinent surgical history.   Social History     Socioeconomic History    Marital status: Single   Tobacco Use    Smoking status: Some Days     Types: Vaping with nicotine    Smokeless tobacco: Never   Substance and Sexual Activity    Alcohol use: Yes     Alcohol/week: 4.0 standard drinks of alcohol     Types: 1 Glasses of wine, 3 Standard drinks or equivalent per week     Comment: Daily    Drug use: Never    Sexual activity: Yes     Partners: Female     Social Determinants of Health     Financial Resource Strain: Low Risk  (5/24/2023)    Overall Financial Resource Strain (CARDIA)     Difficulty of Paying Living Expenses: Not hard at all   Food Insecurity: No Food Insecurity (5/24/2023)    Hunger Vital Sign     Worried About Running Out of Food in the Last Year: Never true     Ran Out of Food in the Last Year: Never true   Transportation Needs: No Transportation Needs (5/24/2023)    PRAPARE - Transportation     Lack of Transportation (Medical): No     Lack of Transportation (Non-Medical): No   Physical Activity: Insufficiently Active (5/24/2023)    Exercise Vital Sign     Days of Exercise per Week: 1 day     Minutes of Exercise per Session: 20 min   Stress: No Stress Concern Present (5/24/2023)    [employer] - Occupational Stress Questionnaire     Feeling of Stress : Only a little   Social Connections: Moderately Integrated (5/24/2023)    Social Connection and Isolation Panel [NHANES]     Frequency of Communication with Friends and Family: More than three times a week     Frequency of Social Gatherings with Friends and Family: Three times a week     Attends S[employer] Services: 1 to 4 times per year     Active Member of Clubs or Organizations: Yes     Attends Club or Organization Meetings: Never     Marital Status:  Never    Housing Stability: Low Risk  (5/24/2023)    Housing Stability Vital Sign     Unable to Pay for Housing in the Last Year: No     Number of Places Lived in the Last Year: 1     Unstable Housing in the Last Year: No      Family History   Problem Relation Age of Onset    No Known Problems Mother     No Known Problems Father       Reason for Follow-up:  Secondary erythrocytosis    Tobacco use     History of Present Illness:   No chief complaint on file.    Oncologic/Hematologic History:  Oncology History    No history exists.   # secondary erythrocytosis:  Severe erythrocytosis, hemoglobin 24, diagnosed in this generally healthy middle-aged gentleman,   about 17-pack-year smoker but with no history of cardiopulmonary problems or dyspnea.   Erythropoietin level is not suppressed; this is a point against polycythemia vera.    Bone marrow examination does not suggest polycythemia vera.    JANESSA-2 mutation negative (both JANESSA-2 V617F, as well as exons 12-15 are negative).  CALR mutation negative in bone marrow.  >> Polycythemia vera ruled out   (erythropoietin level not suppressed; JANESSA mutation negative)  Patient has some kind of secondary erythrocytosis   -Phlebotomized: 11/20/2017; 12/11/2017; 01/02/2018; 03/12/2019 (H/H 18.6/51.4); 07/15/2019 (H/H 18.0/52.2); 10/14/2019 (H/H 18.1/53.6)   -H/H: 16.2/48.8 (12/09/2019); 16.2/48.8 (11/11/2019; 16.0/48.1 (10/21/2019); 18.1/53.6 (10/08/2019), etc.   -After last therapeutic phlebotomy on 10/14/2019 (H/H 18.1/23.6), his H/H has remained stable, 16.6/49.9 as of 02/10/2020  -Therapeutic phlebotomy 605 cc on 04/21/2020 (H/H 17.7/52.2)   -07/06/2020: H/H 17.4/51.6  -Therapeutic phlebotomy 07/09/2020 (H/H 17.4/51.6), 07/20/2020 (H/H 15.3/45.3), 07/27/2020 (H/H 14.8/45.2)   -08/03/2020: H/H 14.0/42.2 (phlebotomy held)   -08/11/2020: H/H 13.9/41.4 (phlebotomy held)  -09/14/2020: CBC reviewed.  H/H 14.6/44.1.  -Since 09/15/2020: Therapeutic phlebotomy: 12/14/2020,  2021, 2021, 2021, 2022, 2022        -2022:  H/H 13.9/45.0   -S/P therapeutic phlebotomy 2022, per patient preference (534 mL phlebotomized)  -2023:  H/H 13.1/43.7    Interval History:  THERAPEUTIC PHLEBOTOMY   [No matching plan found]   2023:   -2023: Labs reviewed; hemoglobin 14.9; hematocrit 46.3; CBC essentially unremarkable; CMP unremarkable  Presents for a follow-up visit.  Doing well.  No complaints.  No unusual headaches, focal neurological symptoms, bleeding in any form, chest pain, cough, dyspnea, fatigue, lethargy, etc..  He thinks that he needs to be phlebotomized today.  Pain in right knee, 7/10 severity.  Also follows up with Rheumatology.      Medications:  Current Outpatient Medications on File Prior to Visit   Medication Sig Dispense Refill    cholecalciferol, vitamin D3, 1,250 mcg (50,000 unit) capsule Take 1 capsule (50,000 Units total) by mouth once a week. 5 capsule 5    diclofenac (VOLTAREN) 50 MG EC tablet Take 1 tablet (50 mg total) by mouth 2 (two) times daily as needed (pain, after food). 60 tablet 5    hydrALAZINE (APRESOLINE) 25 MG tablet Take 1 tablet (25 mg total) by mouth daily as needed (htn). 30 tablet 11    hydrOXYchloroQUINE (PLAQUENIL) 200 mg tablet Take 1 tablet (200 mg total) by mouth 2 (two) times daily. After food 60 tablet 5    hydrOXYzine pamoate (VISTARIL) 25 MG Cap Take 25 mg by mouth 3 (three) times daily.      losartan (COZAAR) 100 MG tablet Take 1 tablet (100 mg total) by mouth once daily. 90 tablet 3    mupirocin (BACTROBAN) 2 % ointment SMARTSI Application Topical 2-3 Times Daily      omeprazole (PRILOSEC) 40 MG capsule Take 1 capsule (40 mg total) by mouth once daily. Before lunch 30 capsule 11    zolpidem (AMBIEN) 10 mg Tab Take 1 tablet (10 mg total) by mouth every evening. 30 tablet 5    aspirin (ECOTRIN) 81 MG EC tablet Take 1 tablet (81 mg total) by mouth once daily. 30 tablet 11    NIFEdipine  "(PROCARDIA-XL) 60 MG (OSM) 24 hr tablet Take 1 tablet (60 mg total) by mouth once daily. (Patient not taking: Reported on 11/20/2023) 90 tablet 3     No current facility-administered medications on file prior to visit.       Review of Systems:   All systems reviewed and found to be negative except for the symptoms detailed above    Physical Examination:   VITAL SIGNS:   Vitals:    11/20/23 1117   BP: 121/86   Pulse: 87   Resp: 18   Temp: 98.8 °F (37.1 °C)       GENERAL:  In no apparent distress.    HEAD:  No signs of head trauma.  EYES:  Pupils are equal.  Extraocular motions intact.    EARS:  Hearing grossly intact.  MOUTH:  Oropharynx is normal.   NECK:  No adenopathy, no JVD.     CHEST:  Chest with clear breath sounds bilaterally.  No wheezes, rales, rhonchi.    CARDIAC:  Regular rate and rhythm.  S1 and S2, without murmurs, gallops, rubs.  VASCULAR:  No Edema.  Peripheral pulses normal and equal in all extremities.  ABDOMEN:  Soft, without detectable tenderness.  No sign of distention.  No   rebound or guarding, and no masses palpated.   Bowel Sounds normal.  MUSCULOSKELETAL:  Good range of motion of all major joints. Extremities without clubbing, cyanosis or edema.    NEUROLOGIC EXAM:  Alert and oriented x 3.  No focal sensory or strength deficits.   Speech normal.  Follows commands.  PSYCHIATRIC:  Mood normal.    No results for input(s): "CBC" in the last 72 hours.   No results for input(s): "CMP" in the last 72 hours.     Assessment:  Problem List Items Addressed This Visit          Oncology    Secondary erythrocytosis - Primary       Other    Tobacco abuse       Secondary erythrocytosis:  Presented with hemoglobin 24, completely asymptomatic  Smoking history but no dyspnea  JAK2 mutation negative.  EPO level not suppressed  >> thus, polycythemia vera ruled out  However, has behaved more or less like polycythemia vera (persistently elevated H/H), requiring periodic therapeutic phlebotomy treatments (but no " history of blood clots, aquagenic pruritus, or any symptoms of hyperviscosity state)  Initiated therapeutic phlebotomy treatments in view of severity of hemoglobin elevation  Empirically, target hematocrit <45  -01/30/2023:  H/H 13.0/41.5  -04/24/2023:  Hemoglobin 13.7.  Hematocrit 42.7.  -07/31/2023:  Hemoglobin 15.1.  Hematocrit 47.6; phlebotomized 505 cc  -09/11/2023:  Hemoglobin 13.8.  Hematocrit 45.1.        Plan:   -No evidence of polycythemia vera   -Has secondary polycythemia   -No symptoms of hyperviscosity state   -However, has behaved more or less like polycythemia vera (persistently elevated H/H), requiring periodic therapeutic phlebotomy treatments (however, no symptoms of hyperviscosity state, blood clots, aquagenic pruritus, erythromelalgia, etc.)  -01/30/2023:  H/H 13.0/41.5  -04/24/2023:  Hemoglobin 13.7.  Hematocrit 42.7.  -07/31/2023:  Hemoglobin 15.1.  Hematocrit 47.6; phlebotomized 505 cc  -09/11/2023:  Hemoglobin 13.8.  Hematocrit 45.1.  -11/20/2023:  Hemoglobin 14.9.  Hematocrit 46.3  >>>  Empirically, therapeutic phlebotomy p.r.n. to keep hematocrit =/<45  Hold phlebotomy if hematocrit <45  -11/20/2023:  H/H 14.9/46.3; he says that he will feel much better if he gets phlebotomized today; will arrange for phlebotomy of 1 unit today  In view of history of hypertension and tobacco abuse, reasonable to continue baby aspirin 81 mg p.o. daily (in the absence of cardiovascular risk factors, baby aspirin is not indicated in secondary polycythemia)      -He has quit smoking    -follows up with Rheumatology for arthralgias/arthritis MCP, PIP, wrist, elbow, shoulders, hips, knees, and ankles bilaterally; muscle aches; fatigue; seropositive rheumatoid arthritis; fibromyalgia syndrome; on hydroxychloroquine, diclofenac    Follow-up in 2 months, with CBC and CMP.    Above discussed with him.  All questions answered.    Discussed labs and gave him copies of relevant records.    He understands and agrees  with this plan.   ----------------        Discussion:  He has secondary erythrocytosis of unclear etiology.    No evidence of polycythemia vera (normal erythropoietin level and negative JANESSA-2 mutation rule out polycythemia vera).  However, in terms of persistence of elevated H/H, has behaved more or less like polycythemia vera.  No dyspnea or hypoxia  Had no symptoms of hyperviscosity state at any time despite severe elevation of hemoglobin.  Regardless, to be on the safer side, will continue therapeutic phlebotomy as needed.  He is proud that he has quit smoking altogether on my advice.       Follow-up:  No follow-ups on file.

## 2023-11-20 NOTE — NURSING
Pt hematocrit 46.3%; therapeutic phlebotomy performed in right antecubital times one attempt with 520 ml blood removed; pt tolerated without incident.

## 2023-11-27 ENCOUNTER — TELEPHONE (OUTPATIENT)
Dept: FAMILY MEDICINE | Facility: CLINIC | Age: 43
End: 2023-11-27

## 2023-11-27 ENCOUNTER — OFFICE VISIT (OUTPATIENT)
Dept: FAMILY MEDICINE | Facility: CLINIC | Age: 43
End: 2023-11-27

## 2023-11-27 ENCOUNTER — HOSPITAL ENCOUNTER (OUTPATIENT)
Dept: RADIOLOGY | Facility: HOSPITAL | Age: 43
Discharge: HOME | End: 2023-11-27
Attending: NURSE PRACTITIONER

## 2023-11-27 VITALS
BODY MASS INDEX: 26.33 KG/M2 | RESPIRATION RATE: 18 BRPM | TEMPERATURE: 98 F | HEART RATE: 88 BPM | DIASTOLIC BLOOD PRESSURE: 77 MMHG | SYSTOLIC BLOOD PRESSURE: 119 MMHG | HEIGHT: 65 IN | WEIGHT: 158 LBS | OXYGEN SATURATION: 100 %

## 2023-11-27 DIAGNOSIS — R10.32 LEFT LOWER QUADRANT ABDOMINAL PAIN: Primary | ICD-10-CM

## 2023-11-27 DIAGNOSIS — F17.200 VAPING NICOTINE DEPENDENCE, NON-TOBACCO PRODUCT: ICD-10-CM

## 2023-11-27 DIAGNOSIS — R10.32 LEFT LOWER QUADRANT ABDOMINAL PAIN: ICD-10-CM

## 2023-11-27 PROCEDURE — 74019 RADEX ABDOMEN 2 VIEWS: CPT | Mod: TC

## 2023-11-27 PROCEDURE — 99215 OFFICE O/P EST HI 40 MIN: CPT | Mod: PBBFAC | Performed by: NURSE PRACTITIONER

## 2023-11-27 PROCEDURE — 99214 PR OFFICE/OUTPT VISIT, EST, LEVL IV, 30-39 MIN: ICD-10-PCS | Mod: S$PBB,,, | Performed by: NURSE PRACTITIONER

## 2023-11-27 PROCEDURE — 99214 OFFICE O/P EST MOD 30 MIN: CPT | Mod: S$PBB,,, | Performed by: NURSE PRACTITIONER

## 2023-11-27 RX ORDER — SIMETHICONE 125 MG
125 CAPSULE ORAL 4 TIMES DAILY PRN
Qty: 30 EACH | Refills: 1 | Status: SHIPPED | OUTPATIENT
Start: 2023-11-27

## 2023-11-27 NOTE — TELEPHONE ENCOUNTER
----- Message from CLAUDIA López sent at 11/27/2023  3:05 PM CST -----  Informed patient abdominal x-rays normal.  No sign of gas or free air or constipation.  Follow direction as discussed in the clinic start Gas-X as needed as well decrease alcohol intake and vaping with nicotine.   Patient voiced understanding about results.

## 2023-11-27 NOTE — PROGRESS NOTES
Please notify patient abdominal x-rays normal.  No sign of gas or free air or constipation.  Follow direction as discussed in the clinic start Gas-X as needed as well decrease alcohol intake and vaping with nicotine.

## 2023-11-27 NOTE — TELEPHONE ENCOUNTER
Informed patient abdominal x-rays normal.  No sign of gas or free air or constipation.  Follow direction as discussed in the clinic start Gas-X as needed as well decrease alcohol intake and vaping with nicotine.   Patient voiced understanding about results.

## 2023-11-27 NOTE — PROGRESS NOTES
Patient Name: Sean Dunham   : 1980  MRN: [id number]     SUBJECTIVE DATA:    CHIEF COMPLAINT:   Sean Dunham is a 43 y.o. male who presents to clinic today with Abdominal Pain (Left side)        HPI:  43-year-old male presents to the clinic requesting evaluation left lower abdominal pain.    Left lower abdominal pain:  Patient state Left abd swelling off and on.  Patient symptoms has been going on for awhile.  Patient state last bowel movement was yesterday and was normal.  State episodic diarrhea last week, resolved.  Denies starting on a new medication or taking antibiotics recently.  Patient state the abdominal discomfort sometimes it affect his appetite.  Denies any nausea vomiting or diarrhea.  During exam abdominal sound active, gassy.  Discussed with patient we can initiate x-rays of abdomen and also start on anti gas medication.  Rx Gas-X to be taken 4 times a day as needed for bloating.  X-ray pending will notify of test results when it becomes available.  Also discussed the need to decrease alcohol intake.   Alcohol Use: Heavy Drinker (2023)    AUDIT-C     Frequency of Alcohol Consumption: 2-3 times a week     Average Number of Drinks: 3 or 4     Frequency of Binge Drinking: Less than monthly    Which can cause stomach discomfort.  Also discussed smoke cessation patient agreed to be referred for smoke counseling.  Discussed with patient to continue medications as prescribed, stay hydrated with water, stay physically active.  Patient to read discharge education materials.  Questions solicited and answered, patient verbalized and agreed to plan.    ALLERGIES: Review of patient's allergies indicates:  No Known Allergies      ROS:  Review of Systems   Gastrointestinal:  Positive for abdominal pain.   All other systems reviewed and are negative.        OBJECTIVE DATA:  Vital signs  Vitals:    23 0736   BP: 119/77   Pulse: 88   Resp: 18   Temp: 98.1 °F (36.7 °C)   TempSrc: Oral   SpO2: 100%  "  Weight: 71.7 kg (158 lb)   Height: 5' 5" (1.651 m)      Body mass index is 26.29 kg/m².    PHYSICAL EXAM:   Physical Exam  Vitals and nursing note reviewed.   Constitutional:       General: He is awake. He is not in acute distress.     Appearance: Normal appearance. He is well-developed and overweight. He is not ill-appearing, toxic-appearing or diaphoretic.   HENT:      Head: Normocephalic and atraumatic.      Right Ear: Tympanic membrane, ear canal and external ear normal. There is no impacted cerumen.      Left Ear: Tympanic membrane, ear canal and external ear normal. There is no impacted cerumen.      Nose: No congestion or rhinorrhea.      Mouth/Throat:      Mouth: Mucous membranes are moist.      Tongue: No lesions. Tongue does not deviate from midline.      Palate: No mass and lesions.      Pharynx: Oropharynx is clear. Uvula midline.   Eyes:      General: Lids are normal. No scleral icterus.     Extraocular Movements: Extraocular movements intact.      Pupils: Pupils are equal, round, and reactive to light.   Neck:      Trachea: Trachea and phonation normal.   Cardiovascular:      Rate and Rhythm: Normal rate and regular rhythm.      Pulses: Normal pulses.           Radial pulses are 2+ on the right side and 2+ on the left side.      Heart sounds: Normal heart sounds.   Pulmonary:      Effort: Pulmonary effort is normal.      Breath sounds: Normal breath sounds and air entry.   Abdominal:      General: Abdomen is flat. Bowel sounds are normal. There is no distension.      Palpations: Abdomen is soft. There is no mass.      Tenderness: There is no abdominal tenderness. There is no right CVA tenderness, left CVA tenderness, guarding or rebound.      Hernia: No hernia is present.   Musculoskeletal:      Cervical back: Normal range of motion and neck supple. No rigidity or tenderness.      Right lower leg: No edema.      Left lower leg: No edema.   Lymphadenopathy:      Cervical: No cervical adenopathy. "   Skin:     General: Skin is warm.      Capillary Refill: Capillary refill takes less than 2 seconds.      Findings: No rash.   Neurological:      General: No focal deficit present.      Mental Status: He is alert and oriented to person, place, and time. Mental status is at baseline.      GCS: GCS eye subscore is 4. GCS verbal subscore is 5. GCS motor subscore is 6.      Cranial Nerves: No cranial nerve deficit.      Sensory: No sensory deficit.      Motor: No weakness.      Coordination: Coordination normal.      Gait: Gait normal.   Psychiatric:         Attention and Perception: Attention and perception normal.         Mood and Affect: Mood normal.         Speech: Speech normal.         Behavior: Behavior normal. Behavior is cooperative.         Thought Content: Thought content normal.         Cognition and Memory: Cognition and memory normal.         Judgment: Judgment normal.          ASSESSMENT/PLAN:  1. Left lower quadrant abdominal pain  Assessment & Plan:  Patient state Left abd swelling off and on.  Patient symptoms has been going on for awhile.  Patient state last bowel movement was yesterday and was normal.  State episodic diarrhea last week, resolved.  Denies starting on a new medication or taking antibiotics recently.  Patient state the abdominal discomfort sometimes it affect his appetite.  Denies any nausea vomiting or diarrhea.  During exam abdominal sound active, gassy.  Discussed with patient we can initiate x-rays of abdomen and also start on anti gas medication.  Rx Gas-X to be taken 4 times a day as needed for bloating.  X-ray pending will notify of test results when it becomes available.  Also discussed the need to decrease alcohol intake.   Alcohol Use: Heavy Drinker (11/27/2023)    AUDIT-C     Frequency of Alcohol Consumption: 2-3 times a week     Average Number of Drinks: 3 or 4     Frequency of Binge Drinking: Less than monthly    Which can cause stomach discomfort.  Also discussed smoke  cessation patient agreed to be referred for smoke counseling.  Discussed with patient to continue medications as prescribed, stay hydrated with water, stay physically active.  Patient to read discharge education materials.  Questions solicited and answered, patient verbalized and agreed to plan.    Orders:  -     X-Ray Abdomen Flat And Erect; Future; Expected date: 11/27/2023  -     simethicone (MYLICON) 125 mg Cap capsule; Take 1 capsule (125 mg total) by mouth 4 (four) times daily as needed for Flatulence.  Dispense: 30 each; Refill: 1    2. Vaping nicotine dependence, non-tobacco product  Assessment & Plan:  Patient agreed to be referred to smoke cessation counseling.  Referral initiated.    Orders:  -     Ambulatory referral/consult to Smoking Cessation Program; Future; Expected date: 12/04/2023           RESULTS:  Recent Results (from the past 1008 hour(s))   Comprehensive Metabolic Panel    Collection Time: 11/20/23  9:03 AM   Result Value Ref Range    Sodium Level 137 136 - 145 mmol/L    Potassium Level 4.1 3.5 - 5.1 mmol/L    Chloride 105 98 - 107 mmol/L    Carbon Dioxide 22 22 - 29 mmol/L    Glucose Level 87 74 - 100 mg/dL    Blood Urea Nitrogen 6.4 (L) 8.9 - 20.6 mg/dL    Creatinine 0.89 0.73 - 1.18 mg/dL    Calcium Level Total 9.5 8.4 - 10.2 mg/dL    Protein Total 7.9 6.4 - 8.3 gm/dL    Albumin Level 3.4 (L) 3.5 - 5.0 g/dL    Globulin 4.5 (H) 2.4 - 3.5 gm/dL    Albumin/Globulin Ratio 0.8 (L) 1.1 - 2.0 ratio    Bilirubin Total 0.4 <=1.5 mg/dL    Alkaline Phosphatase 73 40 - 150 unit/L    Alanine Aminotransferase 18 0 - 55 unit/L    Aspartate Aminotransferase 20 5 - 34 unit/L    eGFR >60 mls/min/1.73/m2   CBC with Differential    Collection Time: 11/20/23  9:03 AM   Result Value Ref Range    WBC 4.64 4.50 - 11.50 x10(3)/mcL    RBC 5.71 4.70 - 6.10 x10(6)/mcL    Hgb 14.9 14.0 - 18.0 g/dL    Hct 46.3 42.0 - 52.0 %    MCV 81.1 80.0 - 94.0 fL    MCH 26.1 (L) 27.0 - 31.0 pg    MCHC 32.2 (L) 33.0 - 36.0 g/dL     RDW 15.7 11.5 - 17.0 %    Platelet 301 130 - 400 x10(3)/mcL    MPV 10.4 7.4 - 10.4 fL    Neut % 65.1 %    Lymph % 25.4 %    Mono % 7.8 %    Eos % 0.9 %    Basophil % 0.6 %    Lymph # 1.18 0.6 - 4.6 x10(3)/mcL    Neut # 3.02 2.1 - 9.2 x10(3)/mcL    Mono # 0.36 0.1 - 1.3 x10(3)/mcL    Eos # 0.04 0 - 0.9 x10(3)/mcL    Baso # 0.03 <=0.2 x10(3)/mcL    IG# 0.01 0 - 0.04 x10(3)/mcL    IG% 0.2 %    NRBC% 0.0 %         Follow Up:  Follow up in about 6 months (around 5/24/2024).     Face to face  including documentation, chart review, counseling, education, review of test results, relevant medical records, and coordination of care.   I have explained the treatment plan, diagnosis, and prognosis to patient. All questions are answered to the best of my knowledge.     Previous medical history/lab work/radiology reviewed and considered during medical management decisions.   Medication list reviewed and medication reconciliation performed.  Patient was provided  and care about his/her current diagnosis (es) and medications including risk/benefit and side effects/adverse events, over the counter medication uses/doses, home self-care and contact precautions,  and red flags and indications for when to seek immediate medical attention.   Patient was advised to continue compliance with current medication list and medical recommendations.  Patient dvised continued compliance with recommended eating habits/ diets for medical conditions and exercise 150 minutes/ week (if possible) for medical condition (s).  Educational handouts and instructions on selected disease management in AVS (After Visit Summary).    All of the patient's questions were answered to patient's satisfaction.   The patient was receptive, expressed verbal understanding and agreement the above plan.     This note was created with the assistance of a voice recognition software or phone dictation. There may be transcription errors as a result of using this  technology however minimal. Effort has been made to assure accuracy of transcription but any obvious errors or omissions should be clarified with the author of the document

## 2023-11-27 NOTE — ASSESSMENT & PLAN NOTE
Patient agreed to be referred to smoke cessation counseling.  Referral initiated.  
Patient state Left abd swelling off and on.  Patient symptoms has been going on for awhile.  Patient state last bowel movement was yesterday and was normal.  State episodic diarrhea last week, resolved.  Denies starting on a new medication or taking antibiotics recently.  Patient state the abdominal discomfort sometimes it affect his appetite.  Denies any nausea vomiting or diarrhea.  During exam abdominal sound active, gassy.  Discussed with patient we can initiate x-rays of abdomen and also start on anti gas medication.  Rx Gas-X to be taken 4 times a day as needed for bloating.  X-ray pending will notify of test results when it becomes available.  Also discussed the need to decrease alcohol intake.   Alcohol Use: Heavy Drinker (11/27/2023)    AUDIT-C     Frequency of Alcohol Consumption: 2-3 times a week     Average Number of Drinks: 3 or 4     Frequency of Binge Drinking: Less than monthly    Which can cause stomach discomfort.  Also discussed smoke cessation patient agreed to be referred for smoke counseling.  Discussed with patient to continue medications as prescribed, stay hydrated with water, stay physically active.  Patient to read discharge education materials.  Questions solicited and answered, patient verbalized and agreed to plan.  
Madelaine

## 2024-01-20 NOTE — PROGRESS NOTES
History:  Past Medical History:   Diagnosis Date    Acid reflux     Anxiety     Hypertension    Past medical history:  Hypertension.  Tobacco abuse.       History reviewed. No pertinent surgical history.   Social History     Socioeconomic History    Marital status: Single   Tobacco Use    Smoking status: Some Days     Types: Vaping with nicotine    Smokeless tobacco: Never   Substance and Sexual Activity    Alcohol use: Yes     Alcohol/week: 4.0 standard drinks of alcohol     Types: 1 Glasses of wine, 3 Standard drinks or equivalent per week     Comment: Daily    Drug use: Never    Sexual activity: Yes     Partners: Female     Social Determinants of Health     Financial Resource Strain: Low Risk  (5/24/2023)    Overall Financial Resource Strain (CARDIA)     Difficulty of Paying Living Expenses: Not hard at all   Food Insecurity: No Food Insecurity (5/24/2023)    Hunger Vital Sign     Worried About Running Out of Food in the Last Year: Never true     Ran Out of Food in the Last Year: Never true   Transportation Needs: No Transportation Needs (5/24/2023)    PRAPARE - Transportation     Lack of Transportation (Medical): No     Lack of Transportation (Non-Medical): No   Physical Activity: Insufficiently Active (5/24/2023)    Exercise Vital Sign     Days of Exercise per Week: 1 day     Minutes of Exercise per Session: 20 min   Stress: No Stress Concern Present (5/24/2023)    [employer] [place] [employer] - Occupational Stress Questionnaire     Feeling of Stress : Only a little   Social Connections: Moderately Integrated (5/24/2023)    Social Connection and Isolation Panel [NHANES]     Frequency of Communication with Friends and Family: More than three times a week     Frequency of Social Gatherings with Friends and Family: Three times a week     Attends Bahai Services: 1 to 4 times per year     Active Member of Clubs or Organizations: Yes     Attends Club or Organization Meetings: Never     Marital Status:  Never    Housing Stability: Low Risk  (5/24/2023)    Housing Stability Vital Sign     Unable to Pay for Housing in the Last Year: No     Number of Places Lived in the Last Year: 1     Unstable Housing in the Last Year: No      Family History   Problem Relation Age of Onset    No Known Problems Mother     No Known Problems Father       Reason for Follow-up:  Secondary erythrocytosis    Tobacco use     History of Present Illness:   Secondary erythrocytosis    Oncologic/Hematologic History:  Oncology History    No history exists.   # secondary erythrocytosis:  Severe erythrocytosis, hemoglobin 24, diagnosed in this generally healthy middle-aged gentleman,   about 17-pack-year smoker but with no history of cardiopulmonary problems or dyspnea.   Erythropoietin level is not suppressed; this is a point against polycythemia vera.    Bone marrow examination does not suggest polycythemia vera.    JANESSA-2 mutation negative (both JANESSA-2 V617F, as well as exons 12-15 are negative).  CALR mutation negative in bone marrow.  >> Polycythemia vera ruled out   (erythropoietin level not suppressed; JANESSA mutation negative)  Patient has some kind of secondary erythrocytosis   -Phlebotomized: 11/20/2017; 12/11/2017; 01/02/2018; 03/12/2019 (H/H 18.6/51.4); 07/15/2019 (H/H 18.0/52.2); 10/14/2019 (H/H 18.1/53.6)   -H/H: 16.2/48.8 (12/09/2019); 16.2/48.8 (11/11/2019; 16.0/48.1 (10/21/2019); 18.1/53.6 (10/08/2019), etc.   -After last therapeutic phlebotomy on 10/14/2019 (H/H 18.1/23.6), his H/H has remained stable, 16.6/49.9 as of 02/10/2020  -Therapeutic phlebotomy 605 cc on 04/21/2020 (H/H 17.7/52.2)   -07/06/2020: H/H 17.4/51.6  -Therapeutic phlebotomy 07/09/2020 (H/H 17.4/51.6), 07/20/2020 (H/H 15.3/45.3), 07/27/2020 (H/H 14.8/45.2)   -08/03/2020: H/H 14.0/42.2 (phlebotomy held)   -08/11/2020: H/H 13.9/41.4 (phlebotomy held)  -09/14/2020: CBC reviewed.  H/H 14.6/44.1.  -Since 09/15/2020: Therapeutic phlebotomy: 12/14/2020, 04/19/2021,  2021, 2021, 2022, 2022        -2022:  H/H 13.9/45.0   -S/P therapeutic phlebotomy 2022, per patient preference (534 mL phlebotomized)  -2023:  H/H 13.1/43.7    Interval History:  THERAPEUTIC PHLEBOTOMY   [No matching plan found]   2024:   -therapeutic phlebotomy performed 2023 (520 cc blood)  -2024:  CBC reviewed; hemoglobin 13.7, hematocrit 41.9   Presents for a follow-up visit.  Doing well.  No symptoms of hyperviscosity state.  No weakness, fatigue, lethargy, headaches, nosebleeds, chest pain, dyspnea, dizziness, etc..  Appetite is okay.  Does not need to be phlebotomized today.      Medications:  Current Outpatient Medications on File Prior to Visit   Medication Sig Dispense Refill    cholecalciferol, vitamin D3, 1,250 mcg (50,000 unit) capsule Take 1 capsule (50,000 Units total) by mouth once a week. 5 capsule 5    diclofenac (VOLTAREN) 50 MG EC tablet Take 1 tablet (50 mg total) by mouth 2 (two) times daily as needed (pain, after food). 60 tablet 5    hydrALAZINE (APRESOLINE) 25 MG tablet Take 1 tablet (25 mg total) by mouth daily as needed (htn). 30 tablet 11    hydrOXYchloroQUINE (PLAQUENIL) 200 mg tablet Take 1 tablet (200 mg total) by mouth 2 (two) times daily. After food 60 tablet 5    hydrOXYzine pamoate (VISTARIL) 25 MG Cap Take 25 mg by mouth 3 (three) times daily.      losartan (COZAAR) 100 MG tablet Take 1 tablet (100 mg total) by mouth once daily. 90 tablet 3    mupirocin (BACTROBAN) 2 % ointment SMARTSI Application Topical 2-3 Times Daily      omeprazole (PRILOSEC) 40 MG capsule Take 1 capsule (40 mg total) by mouth once daily. Before lunch 30 capsule 11    simethicone (MYLICON) 125 mg Cap capsule Take 1 capsule (125 mg total) by mouth 4 (four) times daily as needed for Flatulence. 30 each 1    zolpidem (AMBIEN) 10 mg Tab Take 1 tablet (10 mg total) by mouth every evening. 30 tablet 5    aspirin (ECOTRIN) 81 MG EC tablet Take 1 tablet  "(81 mg total) by mouth once daily. 30 tablet 11    NIFEdipine (PROCARDIA-XL) 60 MG (OSM) 24 hr tablet Take 1 tablet (60 mg total) by mouth once daily. (Patient not taking: Reported on 11/20/2023) 90 tablet 3     No current facility-administered medications on file prior to visit.       Review of Systems:   All systems reviewed and found to be negative except for the symptoms detailed above    Physical Examination:   VITAL SIGNS:   Vitals:    01/22/24 1024   BP: 136/85   Pulse: 101   Resp: 18   Temp: 98.6 °F (37 °C)       GENERAL:  In no apparent distress.    HEAD:  No signs of head trauma.  EYES:  Pupils are equal.  Extraocular motions intact.    EARS:  Hearing grossly intact.  MOUTH:  Oropharynx is normal.   NECK:  No adenopathy, no JVD.     CHEST:  Chest with clear breath sounds bilaterally.  No wheezes, rales, rhonchi.    CARDIAC:  Regular rate and rhythm.  S1 and S2, without murmurs, gallops, rubs.  VASCULAR:  No Edema.  Peripheral pulses normal and equal in all extremities.  ABDOMEN:  Soft, without detectable tenderness.  No sign of distention.  No   rebound or guarding, and no masses palpated.   Bowel Sounds normal.  MUSCULOSKELETAL:  Good range of motion of all major joints. Extremities without clubbing, cyanosis or edema.    NEUROLOGIC EXAM:  Alert and oriented x 3.  No focal sensory or strength deficits.   Speech normal.  Follows commands.  PSYCHIATRIC:  Mood normal.    No results for input(s): "CBC" in the last 72 hours.   No results for input(s): "CMP" in the last 72 hours.     Assessment:  Problem List Items Addressed This Visit          Immunology/Multi System    Seropositive rheumatoid arthritis       Oncology    Secondary erythrocytosis - Primary       Other    Tobacco abuse   Secondary erythrocytosis:  Presented with hemoglobin 24, completely asymptomatic  Smoking history but no dyspnea  JAK2 mutation negative.  EPO level not suppressed  >> thus, polycythemia vera ruled out  However, has behaved more " or less like polycythemia vera (persistently elevated H/H), requiring periodic therapeutic phlebotomy treatments (but no history of blood clots, aquagenic pruritus, or any symptoms of hyperviscosity state)  Initiated therapeutic phlebotomy treatments in view of severity of hemoglobin elevation  Empirically, target hematocrit <45  -01/30/2023:  H/H 13.0/41.5  -04/24/2023:  Hemoglobin 13.7.  Hematocrit 42.7.  -07/31/2023:  Hemoglobin 15.1.  Hematocrit 47.6; phlebotomized 505 cc  -09/11/2023:  Hemoglobin 13.8.  Hematocrit 45.1.  -he feels much better with hematocrit around 45        Plan:   -No evidence of polycythemia vera   -Has secondary polycythemia   -No symptoms of hyperviscosity state   -However, has behaved more or less like polycythemia vera (persistently elevated H/H), requiring periodic therapeutic phlebotomy treatments (however, no symptoms of hyperviscosity state, blood clots, aquagenic pruritus, erythromelalgia, etc.)  -01/30/2023:  H/H 13.0/41.5  -04/24/2023:  Hemoglobin 13.7.  Hematocrit 42.7.  -07/31/2023:  Hemoglobin 15.1.  Hematocrit 47.6; phlebotomized 505 cc  -09/11/2023:  Hemoglobin 13.8.  Hematocrit 45.1.  -11/20/2023:  Hemoglobin 14.9.  Hematocrit 46.3  -therapeutic phlebotomy performed 11/20/2023 (520 cc blood)  -01/22/2024:  CBC reviewed; hemoglobin 13.7, hematocrit 41.9   >>>  -no need of therapeutic phlebotomy today; no symptoms of hyperviscosity state  Empirically, therapeutic phlebotomy p.r.n. to keep hematocrit =/<45  Hold phlebotomy if hematocrit <45  -11/20/2023:  H/H 14.9/46.3; he says that he will feel much better if he gets phlebotomized today; will arrange for phlebotomy of 1 unit today  In view of history of hypertension and tobacco abuse, reasonable to continue baby aspirin 81 mg p.o. daily (in the absence of cardiovascular risk factors, baby aspirin is not indicated in secondary polycythemia)      -He has quit smoking    -follows up with Rheumatology for arthralgias/arthritis  MCP, PIP, wrist, elbow, shoulders, hips, knees, and ankles bilaterally; muscle aches; fatigue; seropositive rheumatoid arthritis; fibromyalgia syndrome; on hydroxychloroquine, diclofenac    Follow-up in 2 months with CBC and CMP.      Above discussed with him.  All questions answered.    Discussed labs and gave him copies of relevant records.    He understands and agrees with this plan.   ----------------        Discussion:  He has secondary erythrocytosis of unclear etiology.    No evidence of polycythemia vera (normal erythropoietin level and negative JANESSA-2 mutation rule out polycythemia vera).  However, in terms of persistence of elevated H/H, has behaved more or less like polycythemia vera.  No dyspnea or hypoxia  Had no symptoms of hyperviscosity state at any time despite severe elevation of hemoglobin.  Regardless, to be on the safer side, will continue therapeutic phlebotomy as needed.  He is proud that he has quit smoking altogether on my advice.       Follow-up:  No follow-ups on file.

## 2024-01-22 ENCOUNTER — INFUSION (OUTPATIENT)
Dept: INFUSION THERAPY | Facility: HOSPITAL | Age: 44
End: 2024-01-22
Attending: INTERNAL MEDICINE
Payer: COMMERCIAL

## 2024-01-22 ENCOUNTER — OFFICE VISIT (OUTPATIENT)
Dept: HEMATOLOGY/ONCOLOGY | Facility: CLINIC | Age: 44
End: 2024-01-22
Attending: INTERNAL MEDICINE
Payer: COMMERCIAL

## 2024-01-22 ENCOUNTER — LAB VISIT (OUTPATIENT)
Dept: HEMATOLOGY/ONCOLOGY | Facility: CLINIC | Age: 44
End: 2024-01-22
Payer: COMMERCIAL

## 2024-01-22 VITALS
TEMPERATURE: 99 F | SYSTOLIC BLOOD PRESSURE: 136 MMHG | HEIGHT: 65 IN | OXYGEN SATURATION: 99 % | DIASTOLIC BLOOD PRESSURE: 85 MMHG | HEART RATE: 101 BPM | RESPIRATION RATE: 18 BRPM | WEIGHT: 168 LBS | BODY MASS INDEX: 27.99 KG/M2

## 2024-01-22 DIAGNOSIS — D75.1 SECONDARY ERYTHROCYTOSIS: Primary | ICD-10-CM

## 2024-01-22 DIAGNOSIS — Z72.0 TOBACCO ABUSE: ICD-10-CM

## 2024-01-22 DIAGNOSIS — M05.9 SEROPOSITIVE RHEUMATOID ARTHRITIS: ICD-10-CM

## 2024-01-22 LAB
ALBUMIN SERPL-MCNC: 3.8 G/DL (ref 3.5–5)
ALBUMIN/GLOB SERPL: 1 RATIO (ref 1.1–2)
ALP SERPL-CCNC: 104 UNIT/L (ref 40–150)
ALT SERPL-CCNC: 25 UNIT/L (ref 0–55)
AST SERPL-CCNC: 33 UNIT/L (ref 5–34)
BASOPHILS # BLD AUTO: 0.06 X10(3)/MCL
BASOPHILS NFR BLD AUTO: 1.4 %
BILIRUB SERPL-MCNC: 0.3 MG/DL
BUN SERPL-MCNC: 7.1 MG/DL (ref 8.9–20.6)
CALCIUM SERPL-MCNC: 8.9 MG/DL (ref 8.4–10.2)
CHLORIDE SERPL-SCNC: 109 MMOL/L (ref 98–107)
CO2 SERPL-SCNC: 21 MMOL/L (ref 22–29)
CREAT SERPL-MCNC: 0.92 MG/DL (ref 0.73–1.18)
EOSINOPHIL # BLD AUTO: 0.01 X10(3)/MCL (ref 0–0.9)
EOSINOPHIL NFR BLD AUTO: 0.2 %
ERYTHROCYTE [DISTWIDTH] IN BLOOD BY AUTOMATED COUNT: 18.1 % (ref 11.5–17)
GFR SERPLBLD CREATININE-BSD FMLA CKD-EPI: >60 MLS/MIN/1.73/M2
GLOBULIN SER-MCNC: 3.7 GM/DL (ref 2.4–3.5)
GLUCOSE SERPL-MCNC: 109 MG/DL (ref 74–100)
HCT VFR BLD AUTO: 41.9 % (ref 42–52)
HGB BLD-MCNC: 13.7 G/DL (ref 14–18)
IMM GRANULOCYTES # BLD AUTO: 0.01 X10(3)/MCL (ref 0–0.04)
IMM GRANULOCYTES NFR BLD AUTO: 0.2 %
LYMPHOCYTES # BLD AUTO: 1.11 X10(3)/MCL (ref 0.6–4.6)
LYMPHOCYTES NFR BLD AUTO: 26.2 %
MCH RBC QN AUTO: 28.5 PG (ref 27–31)
MCHC RBC AUTO-ENTMCNC: 32.7 G/DL (ref 33–36)
MCV RBC AUTO: 87.3 FL (ref 80–94)
MONOCYTES # BLD AUTO: 0.5 X10(3)/MCL (ref 0.1–1.3)
MONOCYTES NFR BLD AUTO: 11.8 %
NEUTROPHILS # BLD AUTO: 2.55 X10(3)/MCL (ref 2.1–9.2)
NEUTROPHILS NFR BLD AUTO: 60.2 %
NRBC BLD AUTO-RTO: 0 %
PLATELET # BLD AUTO: 282 X10(3)/MCL (ref 130–400)
PMV BLD AUTO: 9.6 FL (ref 7.4–10.4)
POTASSIUM SERPL-SCNC: 3.6 MMOL/L (ref 3.5–5.1)
PROT SERPL-MCNC: 7.5 GM/DL (ref 6.4–8.3)
RBC # BLD AUTO: 4.8 X10(6)/MCL (ref 4.7–6.1)
SODIUM SERPL-SCNC: 141 MMOL/L (ref 136–145)
WBC # SPEC AUTO: 4.24 X10(3)/MCL (ref 4.5–11.5)

## 2024-01-22 PROCEDURE — 99213 OFFICE O/P EST LOW 20 MIN: CPT | Mod: S$PBB,,, | Performed by: INTERNAL MEDICINE

## 2024-01-22 PROCEDURE — 85025 COMPLETE CBC W/AUTO DIFF WBC: CPT

## 2024-01-22 PROCEDURE — 99214 OFFICE O/P EST MOD 30 MIN: CPT | Mod: PBBFAC | Performed by: INTERNAL MEDICINE

## 2024-01-22 PROCEDURE — 80053 COMPREHEN METABOLIC PANEL: CPT

## 2024-01-22 PROCEDURE — 36415 COLL VENOUS BLD VENIPUNCTURE: CPT

## 2024-03-10 DIAGNOSIS — I10 PRIMARY HYPERTENSION: ICD-10-CM

## 2024-03-11 RX ORDER — LOSARTAN POTASSIUM 100 MG/1
100 TABLET ORAL
Qty: 30 TABLET | Refills: 2 | Status: SHIPPED | OUTPATIENT
Start: 2024-03-11 | End: 2024-05-27 | Stop reason: SDUPTHER

## 2024-03-23 NOTE — PROGRESS NOTES
History:  Past Medical History:   Diagnosis Date    Acid reflux     Anxiety     Hypertension    Past medical history:  Hypertension.  Tobacco abuse.       History reviewed. No pertinent surgical history.   Social History     Socioeconomic History    Marital status: Single   Tobacco Use    Smoking status: Some Days     Types: Vaping with nicotine    Smokeless tobacco: Never   Substance and Sexual Activity    Alcohol use: Yes     Alcohol/week: 4.0 standard drinks of alcohol     Types: 1 Glasses of wine, 3 Standard drinks or equivalent per week     Comment: Daily    Drug use: Never    Sexual activity: Yes     Partners: Female     Social Determinants of Health     Financial Resource Strain: Low Risk  (5/24/2023)    Overall Financial Resource Strain (CARDIA)     Difficulty of Paying Living Expenses: Not hard at all   Food Insecurity: No Food Insecurity (5/24/2023)    Hunger Vital Sign     Worried About Running Out of Food in the Last Year: Never true     Ran Out of Food in the Last Year: Never true   Transportation Needs: No Transportation Needs (5/24/2023)    PRAPARE - Transportation     Lack of Transportation (Medical): No     Lack of Transportation (Non-Medical): No   Physical Activity: Insufficiently Active (5/24/2023)    Exercise Vital Sign     Days of Exercise per Week: 1 day     Minutes of Exercise per Session: 20 min   Stress: No Stress Concern Present (5/24/2023)    [employer] [place] [employer] - Occupational Stress Questionnaire     Feeling of Stress : Only a little   Social Connections: Moderately Integrated (5/24/2023)    Social Connection and Isolation Panel [NHANES]     Frequency of Communication with Friends and Family: More than three times a week     Frequency of Social Gatherings with Friends and Family: Three times a week     Attends Spiritism Services: 1 to 4 times per year     Active Member of Clubs or Organizations: Yes     Attends Club or Organization Meetings: Never     Marital Status:  Never    Housing Stability: Low Risk  (5/24/2023)    Housing Stability Vital Sign     Unable to Pay for Housing in the Last Year: No     Number of Places Lived in the Last Year: 1     Unstable Housing in the Last Year: No      Family History   Problem Relation Age of Onset    No Known Problems Mother     No Known Problems Father       Reason for Follow-up:  Secondary erythrocytosis    Tobacco use     History of Present Illness:   Secondary erythrocytosis    Oncologic/Hematologic History:  Oncology History    No history exists.   # secondary erythrocytosis:  Severe erythrocytosis, hemoglobin 24, diagnosed in this generally healthy middle-aged gentleman,   about 17-pack-year smoker but with no history of cardiopulmonary problems or dyspnea.   Erythropoietin level is not suppressed; this is a point against polycythemia vera.    Bone marrow examination does not suggest polycythemia vera.    JANESSA-2 mutation negative (both JANESSA-2 V617F, as well as exons 12-15 are negative).  CALR mutation negative in bone marrow.  >> Polycythemia vera ruled out   (erythropoietin level not suppressed; JANESSA mutation negative)  Patient has some kind of secondary erythrocytosis   -Phlebotomized: 11/20/2017; 12/11/2017; 01/02/2018; 03/12/2019 (H/H 18.6/51.4); 07/15/2019 (H/H 18.0/52.2); 10/14/2019 (H/H 18.1/53.6)   -H/H: 16.2/48.8 (12/09/2019); 16.2/48.8 (11/11/2019; 16.0/48.1 (10/21/2019); 18.1/53.6 (10/08/2019), etc.   -After last therapeutic phlebotomy on 10/14/2019 (H/H 18.1/23.6), his H/H has remained stable, 16.6/49.9 as of 02/10/2020  -Therapeutic phlebotomy 605 cc on 04/21/2020 (H/H 17.7/52.2)   -07/06/2020: H/H 17.4/51.6  -Therapeutic phlebotomy 07/09/2020 (H/H 17.4/51.6), 07/20/2020 (H/H 15.3/45.3), 07/27/2020 (H/H 14.8/45.2)   -08/03/2020: H/H 14.0/42.2 (phlebotomy held)   -08/11/2020: H/H 13.9/41.4 (phlebotomy held)  -09/14/2020: CBC reviewed.  H/H 14.6/44.1.  -Since 09/15/2020: Therapeutic phlebotomy: 12/14/2020, 04/19/2021,  2021, 2021, 2022, 2022        -2022:  H/H 13.9/45.0   -S/P therapeutic phlebotomy 2022, per patient preference (534 mL phlebotomized)  -2023:  H/H 13.1/43.7    Interval History:  THERAPEUTIC PHLEBOTOMY   [No matching plan found]   2024:   -therapeutic phlebotomy performed 2023 (520 cc blood)  -2024:  CBC reviewed; hemoglobin 13.7, hematocrit 41.9   Presents for a follow-up visit.  Doing well.  No symptoms of hyperviscosity state.  No weakness, fatigue, lethargy, headaches, nosebleeds, chest pain, dyspnea, dizziness, etc..  Appetite is okay.  Does not need to be phlebotomized today.    2024:  -2024:  WBC 4.26, hemoglobin 14.2, hematocrit 41.8, platelets 241 K; CMP reviewed, AST 67  Presents for a follow-up visit.  Feels well.  No symptoms of hyperviscosity state.  No headaches, lethargy, dizziness, pruritus, etc..  No dyspnea.  No chest pain.  ECOG 0.      Medications:  Current Outpatient Medications on File Prior to Visit   Medication Sig Dispense Refill    cholecalciferol, vitamin D3, 1,250 mcg (50,000 unit) capsule Take 1 capsule (50,000 Units total) by mouth once a week. 5 capsule 5    diclofenac (VOLTAREN) 50 MG EC tablet Take 1 tablet (50 mg total) by mouth 2 (two) times daily as needed (pain, after food). 60 tablet 5    hydrALAZINE (APRESOLINE) 25 MG tablet Take 1 tablet (25 mg total) by mouth daily as needed (htn). 30 tablet 11    hydrOXYzine pamoate (VISTARIL) 25 MG Cap Take 25 mg by mouth 3 (three) times daily.      losartan (COZAAR) 100 MG tablet TAKE ONE TABLET BY MOUTH ONCE A DAY. 30 tablet 2    mupirocin (BACTROBAN) 2 % ointment SMARTSI Application Topical 2-3 Times Daily      simethicone (MYLICON) 125 mg Cap capsule Take 1 capsule (125 mg total) by mouth 4 (four) times daily as needed for Flatulence. 30 each 1    zolpidem (AMBIEN) 10 mg Tab Take 1 tablet (10 mg total) by mouth every evening. 30 tablet 5    aspirin (ECOTRIN) 81  "MG EC tablet Take 1 tablet (81 mg total) by mouth once daily. 30 tablet 11    NIFEdipine (PROCARDIA-XL) 60 MG (OSM) 24 hr tablet Take 1 tablet (60 mg total) by mouth once daily. (Patient not taking: Reported on 11/20/2023) 90 tablet 3    omeprazole (PRILOSEC) 40 MG capsule Take 1 capsule (40 mg total) by mouth once daily. Before lunch 30 capsule 11     No current facility-administered medications on file prior to visit.       Review of Systems:   All systems reviewed and found to be negative except for the symptoms detailed above    Physical Examination:   VITAL SIGNS:   Vitals:    03/25/24 0828   BP: (!) 142/87   Pulse: 92   Resp: 18   Temp: 98.1 °F (36.7 °C)         GENERAL:  In no apparent distress.    HEAD:  No signs of head trauma.  EYES:  Pupils are equal.  Extraocular motions intact.    EARS:  Hearing grossly intact.  MOUTH:  Oropharynx is normal.   NECK:  No adenopathy, no JVD.     CHEST:  Chest with clear breath sounds bilaterally.  No wheezes, rales, rhonchi.    CARDIAC:  Regular rate and rhythm.  S1 and S2, without murmurs, gallops, rubs.  VASCULAR:  No Edema.  Peripheral pulses normal and equal in all extremities.  ABDOMEN:  Soft, without detectable tenderness.  No sign of distention.  No   rebound or guarding, and no masses palpated.   Bowel Sounds normal.  MUSCULOSKELETAL:  Good range of motion of all major joints. Extremities without clubbing, cyanosis or edema.    NEUROLOGIC EXAM:  Alert and oriented x 3.  No focal sensory or strength deficits.   Speech normal.  Follows commands.  PSYCHIATRIC:  Mood normal.    No results for input(s): "CBC" in the last 72 hours.   No results for input(s): "CMP" in the last 72 hours.     Assessment:  Problem List Items Addressed This Visit          Oncology    Secondary erythrocytosis - Primary       Other    Tobacco abuse     Secondary erythrocytosis:  Presented with hemoglobin 24, completely asymptomatic  Smoking history but no dyspnea  JAK2 mutation negative.  EPO " level not suppressed  >> thus, polycythemia vera ruled out  However, has behaved more or less like polycythemia vera (persistently elevated H/H), requiring periodic therapeutic phlebotomy treatments (but no history of blood clots, aquagenic pruritus, or any symptoms of hyperviscosity state)  Initiated therapeutic phlebotomy treatments in view of severity of hemoglobin elevation  Empirically, target hematocrit <45  -01/30/2023:  H/H 13.0/41.5  -04/24/2023:  Hemoglobin 13.7.  Hematocrit 42.7.  -07/31/2023:  Hemoglobin 15.1.  Hematocrit 47.6; phlebotomized 505 cc  -09/11/2023:  Hemoglobin 13.8.  Hematocrit 45.1.  -he feels much better with hematocrit around 45        Plan:  Follow-up in 2 months with CBC and CMP; to visit with NP.  -------------------------------------       -No evidence of polycythemia vera   -Has secondary polycythemia   -No symptoms of hyperviscosity state   -However, has behaved more or less like polycythemia vera (persistently elevated H/H), requiring periodic therapeutic phlebotomy treatments (however, no symptoms of hyperviscosity state, blood clots, aquagenic pruritus, erythromelalgia, etc.)  -01/30/2023:  H/H 13.0/41.5  -04/24/2023:  Hemoglobin 13.7.  Hematocrit 42.7.  -07/31/2023:  Hemoglobin 15.1.  Hematocrit 47.6; phlebotomized 505 cc  -09/11/2023:  Hemoglobin 13.8.  Hematocrit 45.1.  -11/20/2023:  Hemoglobin 14.9.  Hematocrit 46.3  -therapeutic phlebotomy performed 11/20/2023 (520 cc blood)  -01/22/2024:  CBC reviewed; hemoglobin 13.7, hematocrit 41.9   -11/20/2023:  H/H 14.9/46.3  -03/25/2024:  WBC 4.26, hemoglobin 14.2, hematocrit 41.8, platelets 241 K; CMP reviewed, AST 67  >>>  -no need of therapeutic phlebotomy today; no symptoms of hyperviscosity state  Empirically, therapeutic phlebotomy p.r.n. to keep hematocrit =/<45  Hold phlebotomy if hematocrit <45  In view of history of hypertension and tobacco abuse, reasonable to continue baby aspirin 81 mg p.o. daily (in the absence of  cardiovascular risk factors, baby aspirin is not indicated in secondary polycythemia)      -He has quit smoking    -follows up with Rheumatology for arthralgias/arthritis MCP, PIP, wrist, elbow, shoulders, hips, knees, and ankles bilaterally; muscle aches; fatigue; seropositive rheumatoid arthritis; fibromyalgia syndrome; on hydroxychloroquine, diclofenac    Follow-up in 2 months with CBC and CMP.      Above discussed with him.  All questions answered.    Discussed labs and gave him copies of relevant records.    He understands and agrees with this plan.   ----------------        Discussion:  He has secondary erythrocytosis of unclear etiology.    No evidence of polycythemia vera (normal erythropoietin level and negative JANESSA-2 mutation rule out polycythemia vera).  However, in terms of persistence of elevated H/H, has behaved more or less like polycythemia vera.  No dyspnea or hypoxia  Had no symptoms of hyperviscosity state at any time despite severe elevation of hemoglobin.  Regardless, to be on the safer side, will continue therapeutic phlebotomy as needed.  He is proud that he has quit smoking altogether on my advice.       Follow-up:  No follow-ups on file.

## 2024-03-25 ENCOUNTER — APPOINTMENT (OUTPATIENT)
Dept: HEMATOLOGY/ONCOLOGY | Facility: CLINIC | Age: 44
End: 2024-03-25
Payer: COMMERCIAL

## 2024-03-25 ENCOUNTER — OFFICE VISIT (OUTPATIENT)
Dept: HEMATOLOGY/ONCOLOGY | Facility: CLINIC | Age: 44
End: 2024-03-25
Attending: INTERNAL MEDICINE
Payer: COMMERCIAL

## 2024-03-25 VITALS
WEIGHT: 171.81 LBS | OXYGEN SATURATION: 98 % | DIASTOLIC BLOOD PRESSURE: 87 MMHG | TEMPERATURE: 98 F | BODY MASS INDEX: 28.62 KG/M2 | RESPIRATION RATE: 18 BRPM | HEART RATE: 92 BPM | SYSTOLIC BLOOD PRESSURE: 142 MMHG | HEIGHT: 65 IN

## 2024-03-25 DIAGNOSIS — Z72.0 TOBACCO ABUSE: ICD-10-CM

## 2024-03-25 DIAGNOSIS — D75.1 SECONDARY ERYTHROCYTOSIS: Primary | ICD-10-CM

## 2024-03-25 DIAGNOSIS — D75.1 SECONDARY ERYTHROCYTOSIS: ICD-10-CM

## 2024-03-25 DIAGNOSIS — M05.9 SEROPOSITIVE RHEUMATOID ARTHRITIS: ICD-10-CM

## 2024-03-25 LAB
ALBUMIN SERPL-MCNC: 3.7 G/DL (ref 3.5–5)
ALBUMIN/GLOB SERPL: 1 RATIO (ref 1.1–2)
ALP SERPL-CCNC: 117 UNIT/L (ref 40–150)
ALT SERPL-CCNC: 46 UNIT/L (ref 0–55)
AST SERPL-CCNC: 67 UNIT/L (ref 5–34)
BASOPHILS # BLD AUTO: 0.05 X10(3)/MCL
BASOPHILS NFR BLD AUTO: 1.2 %
BILIRUB SERPL-MCNC: 0.2 MG/DL
BUN SERPL-MCNC: 7.1 MG/DL (ref 8.9–20.6)
CALCIUM SERPL-MCNC: 9.1 MG/DL (ref 8.4–10.2)
CHLORIDE SERPL-SCNC: 109 MMOL/L (ref 98–107)
CO2 SERPL-SCNC: 22 MMOL/L (ref 22–29)
CREAT SERPL-MCNC: 0.9 MG/DL (ref 0.73–1.18)
EOSINOPHIL # BLD AUTO: 0.06 X10(3)/MCL (ref 0–0.9)
EOSINOPHIL NFR BLD AUTO: 1.4 %
ERYTHROCYTE [DISTWIDTH] IN BLOOD BY AUTOMATED COUNT: 14.5 % (ref 11.5–17)
GFR SERPLBLD CREATININE-BSD FMLA CKD-EPI: >60 MLS/MIN/1.73/M2
GLOBULIN SER-MCNC: 3.6 GM/DL (ref 2.4–3.5)
GLUCOSE SERPL-MCNC: 107 MG/DL (ref 74–100)
HCT VFR BLD AUTO: 41.8 % (ref 42–52)
HGB BLD-MCNC: 14.2 G/DL (ref 14–18)
IMM GRANULOCYTES # BLD AUTO: 0.01 X10(3)/MCL (ref 0–0.04)
IMM GRANULOCYTES NFR BLD AUTO: 0.2 %
LYMPHOCYTES # BLD AUTO: 1.43 X10(3)/MCL (ref 0.6–4.6)
LYMPHOCYTES NFR BLD AUTO: 33.6 %
MCH RBC QN AUTO: 30 PG (ref 27–31)
MCHC RBC AUTO-ENTMCNC: 34 G/DL (ref 33–36)
MCV RBC AUTO: 88.4 FL (ref 80–94)
MONOCYTES # BLD AUTO: 0.38 X10(3)/MCL (ref 0.1–1.3)
MONOCYTES NFR BLD AUTO: 8.9 %
NEUTROPHILS # BLD AUTO: 2.33 X10(3)/MCL (ref 2.1–9.2)
NEUTROPHILS NFR BLD AUTO: 54.7 %
NRBC BLD AUTO-RTO: 0 %
PLATELET # BLD AUTO: 241 X10(3)/MCL (ref 130–400)
PMV BLD AUTO: 9.8 FL (ref 7.4–10.4)
POTASSIUM SERPL-SCNC: 3.5 MMOL/L (ref 3.5–5.1)
PROT SERPL-MCNC: 7.3 GM/DL (ref 6.4–8.3)
RBC # BLD AUTO: 4.73 X10(6)/MCL (ref 4.7–6.1)
SODIUM SERPL-SCNC: 141 MMOL/L (ref 136–145)
WBC # SPEC AUTO: 4.26 X10(3)/MCL (ref 4.5–11.5)

## 2024-03-25 PROCEDURE — 80053 COMPREHEN METABOLIC PANEL: CPT

## 2024-03-25 PROCEDURE — 36415 COLL VENOUS BLD VENIPUNCTURE: CPT

## 2024-03-25 PROCEDURE — 85025 COMPLETE CBC W/AUTO DIFF WBC: CPT

## 2024-03-25 PROCEDURE — 99213 OFFICE O/P EST LOW 20 MIN: CPT | Mod: S$PBB,,, | Performed by: INTERNAL MEDICINE

## 2024-03-25 PROCEDURE — 99214 OFFICE O/P EST MOD 30 MIN: CPT | Mod: PBBFAC | Performed by: INTERNAL MEDICINE

## 2024-04-11 NOTE — PROGRESS NOTES
"Subjective:           Patient ID: Sean Dunham is a 43 y.o. male.    Chief Complaint: Follow-up      Mr. Dunham is a 42 y/o male here for a f/u. He established care with Dr Valerio on 7/28/22. Past labs: RF IgM 18 and RF IgG 8, SRIKANTH neg, CCP neg . 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     Followed by Heme/Onc for erythrocytosis.     Today: He is out of HCQ. C/o of occasional hand pain, located more in the thumb. Pain starts after activity of using them. Denies red/warm/swollen joints. Reports morning stiffness is minimal about 5-10 minutes. Denies any recent infection. BP elevated today but reports he just took his medications.            Review of Systems   Constitutional:  Negative for appetite change, chills and fever.   HENT:  Negative for congestion, ear pain, mouth sores, nosebleeds and trouble swallowing.    Eyes:  Negative for photophobia and discharge.   Respiratory:  Negative for chest tightness and shortness of breath.    Cardiovascular:  Negative for chest pain.   Gastrointestinal:  Negative for abdominal pain and vomiting.   Endocrine: Negative.    Genitourinary:  Negative for hematuria.   Musculoskeletal:  Positive for arthralgias.        As per HPI   Skin:  Negative for rash.   Neurological:  Negative for weakness.         Objective:   BP (!) 150/90 (BP Location: Left arm, Patient Position: Sitting, BP Method: Medium (Manual))   Pulse 87   Temp 99 °F (37.2 °C) (Oral)   Resp 18   Ht 5' 5" (1.651 m)   Wt 77.6 kg (171 lb)   SpO2 98%   BMI 28.46 kg/m²          Physical Exam   Constitutional: He is oriented to person, place, and time. He appears well-developed and well-nourished. No distress.   HENT:   Head: Normocephalic and atraumatic.   Right Ear: External ear normal.   Left Ear: External ear normal.   Eyes: Pupils are equal, round, and reactive to light.   Cardiovascular: Normal rate.   Pulmonary/Chest: Effort normal.   Abdominal: " Soft. There is no abdominal tenderness.   Musculoskeletal:      Right elbow: Normal.      Left elbow: Normal.      Right wrist: Normal.      Left wrist: Normal.      Cervical back: Neck supple.      Right knee: Normal.      Left knee: Normal.      Comments:  congenital deformity? No joints in bilateral thumbs..   Lymphadenopathy:     He has no cervical adenopathy.   Neurological: He is alert and oriented to person, place, and time. He displays normal reflexes. No cranial nerve deficit or sensory deficit. He exhibits normal muscle tone. Coordination normal.   Skin: No rash noted. No erythema.   Vitals reviewed.      Right Side Rheumatological Exam     Examination finds the elbow, wrist, knee, 1st PIP, 1st MCP, 2nd PIP, 2nd MCP, 3rd PIP, 3rd MCP, 4th PIP, 4th MCP, 5th PIP, 5th MCP, ankle, 1st MTP, 2nd MTP, 3rd MTP, 4th MTP and 5th MTP normal.    Left Side Rheumatological Exam     Examination finds the elbow, wrist, knee, 1st PIP, 1st MCP, 2nd PIP, 2nd MCP, 3rd PIP, 3rd MCP, 4th PIP, 4th MCP, 5th PIP, 5th MCP, ankle, 1st MTP, 2nd MTP, 3rd MTP, 4th MTP and 5th MTP normal.           Completed Fibromyalgia exam 18/18 tender points.    No data to display     Assessment:         Medication List with Changes/Refills   New Medications    DICLOFENAC SODIUM (VOLTAREN) 1 % GEL    Apply 2 g topically 4 (four) times daily.       Start Date: 4/15/2024 End Date: --    HYDROXYCHLOROQUINE (PLAQUENIL) 200 MG TABLET    Take 1 tablet (200 mg total) by mouth 2 (two) times daily. After food       Start Date: 4/15/2024 End Date: --   Current Medications    ASPIRIN (ECOTRIN) 81 MG EC TABLET    Take 1 tablet (81 mg total) by mouth once daily.       Start Date: 5/23/2022 End Date: 4/15/2024    HYDRALAZINE (APRESOLINE) 25 MG TABLET    Take 1 tablet (25 mg total) by mouth daily as needed (htn).       Start Date: 5/24/2023 End Date: 5/23/2024    HYDROXYZINE PAMOATE (VISTARIL) 25 MG CAP    Take 25 mg by mouth 3 (three) times daily.       Start  Date: 2022 End Date: --    LOSARTAN (COZAAR) 100 MG TABLET    TAKE ONE TABLET BY MOUTH ONCE A DAY.       Start Date: 3/11/2024 End Date: --    MUPIROCIN (BACTROBAN) 2 % OINTMENT    SMARTSI Application Topical 2-3 Times Daily       Start Date: 2022 End Date: --    NIFEDIPINE (PROCARDIA-XL) 60 MG (OSM) 24 HR TABLET    Take 1 tablet (60 mg total) by mouth once daily.       Start Date: 2023 End Date: 2024    SIMETHICONE (MYLICON) 125 MG CAP CAPSULE    Take 1 capsule (125 mg total) by mouth 4 (four) times daily as needed for Flatulence.       Start Date: 2023End Date: --    ZOLPIDEM (AMBIEN) 10 MG TAB    Take 1 tablet (10 mg total) by mouth every evening.       Start Date: 10/19/2023End Date: 2024   Changed and/or Refilled Medications    Modified Medication Previous Medication    DICLOFENAC (VOLTAREN) 50 MG EC TABLET diclofenac (VOLTAREN) 50 MG EC tablet       Take 1 tablet (50 mg total) by mouth daily as needed (pain, after food).    Take 1 tablet (50 mg total) by mouth 2 (two) times daily as needed (pain, after food).       Start Date: 4/15/2024 End Date: --    Start Date: 3/20/2023 End Date: 4/15/2024    OMEPRAZOLE (PRILOSEC) 40 MG CAPSULE omeprazole (PRILOSEC) 40 MG capsule       Take 1 capsule (40 mg total) by mouth once daily. Before lunch    Take 1 capsule (40 mg total) by mouth once daily. Before lunch       Start Date: 4/15/2024 End Date: 4/15/2025    Start Date: 3/20/2023 End Date: 4/15/2024   Discontinued Medications    CHOLECALCIFEROL, VITAMIN D3, 1,250 MCG (50,000 UNIT) CAPSULE    Take 1 capsule (50,000 Units total) by mouth once a week.       Start Date: 2023 End Date: 4/15/2024         ICD-10-CM ICD-9-CM   1. Seropositive rheumatoid arthritis  M05.9 714.0   2. Primary hypertension  I10 401.9   3. Fibromyalgia syndrome  M79.7 729.1   4. Gastroesophageal reflux disease without esophagitis  K21.9 530.81   5. Osteoarthritis of hand, unspecified laterality, unspecified  osteoarthritis type  M19.049 715.94   6. Insomnia, unspecified type  G47.00 780.52   7. NSAID long-term use  Z79.1 V58.64             Plan:       1. Seropositive rheumatoid arthritis  Assessment & Plan:  He has been out of HCQ, not having refills d/t /rescheduled appointments. The plan is to restart hydroxychloroquine 200 mg b.i.d.  Continue diclofenac 50 mg daily PRN for pain (rarely takes this)  Continue omeprazole 40 mg daily      Plaquenil Eye Exam:   Labs completed last week with PCP and available in the EMR for review       Orders:  -     omeprazole (PRILOSEC) 40 MG capsule; Take 1 capsule (40 mg total) by mouth once daily. Before lunch  Dispense: 30 capsule; Refill: 11  -     diclofenac (VOLTAREN) 50 MG EC tablet; Take 1 tablet (50 mg total) by mouth daily as needed (pain, after food).  Dispense: 30 tablet; Refill: 5  -     hydroxychloroquine (PLAQUENIL) 200 mg tablet; Take 1 tablet (200 mg total) by mouth 2 (two) times daily. After food  Dispense: 60 tablet; Refill: 5  -     diclofenac sodium (VOLTAREN) 1 % Gel; Apply 2 g topically 4 (four) times daily.  Dispense: 100 g; Refill: 5    2. Primary hypertension  Assessment & Plan:  BP today is 151/93  Manual recheck: 150/90  Continue RX medications as prescribed by managing provider          3. Fibromyalgia syndrome  Assessment & Plan:  Stable      4. Gastroesophageal reflux disease without esophagitis  Assessment & Plan:  Currently on Omeprazole 40 mg daily     Orders:  -     omeprazole (PRILOSEC) 40 MG capsule; Take 1 capsule (40 mg total) by mouth once daily. Before lunch  Dispense: 30 capsule; Refill: 11    5. Osteoarthritis of hand, unspecified laterality, unspecified osteoarthritis type  Assessment & Plan:    Rx for Diclofenac sodium (voltaren) 1% Gel PRN for hand pain  Can continue Diclofenac 50 mg daily PRN for pain (he rarely takes this)  Discussed compression gloves/arthritis gloves  Warm water can also help    Orders:  -     diclofenac sodium  (VOLTAREN) 1 % Gel; Apply 2 g topically 4 (four) times daily.  Dispense: 100 g; Refill: 5    6. Insomnia, unspecified type  Assessment & Plan:  Previously prescribed Ambien 10 mg nightly- He is aware he is unable to get any future refills for this medication or any scheduled medication since Dr. Hess is no longer practicing here. Discussed options of discussing other insomnia treatment options with PCP or follow Dr. Hess. He requested the information to Dr. Hess's clinic.    Avoid caffeine, alcohol and stimulants.  Power down electronic devices at least one hour prior to bedtime.  Keep room dark; use eye mask or relaxation sound machine to promote rest.  Sleep hygiene refers to actions that tend to improve and maintain good sleep:  Sleep as long as necessary to feel rested (usually seven to eight hours for adults) and then get out of bed  Maintain a regular sleep schedule, particularly a regular wake-up time in the morning  Avoid smoking or other nicotine intake, particularly during the evening      7. NSAID long-term use  Assessment & Plan:  Labs completed last month and reviewed  Monitor kidney functions               30 minutes of total time spent on the encounter, which includes face to face time and non-face to face time preparing to see the patient (eg, review of tests), Obtaining and/or reviewing separately obtained history, Documenting clinical information in the electronic or other health record, Independently interpreting results (not separately reported) and communicating results to the patient/family/caregiver, or Care coordination (not separately reported).

## 2024-04-15 ENCOUNTER — OFFICE VISIT (OUTPATIENT)
Dept: RHEUMATOLOGY | Facility: CLINIC | Age: 44
End: 2024-04-15
Payer: COMMERCIAL

## 2024-04-15 VITALS
BODY MASS INDEX: 28.49 KG/M2 | HEIGHT: 65 IN | WEIGHT: 171 LBS | TEMPERATURE: 99 F | DIASTOLIC BLOOD PRESSURE: 90 MMHG | HEART RATE: 87 BPM | OXYGEN SATURATION: 98 % | RESPIRATION RATE: 18 BRPM | SYSTOLIC BLOOD PRESSURE: 150 MMHG

## 2024-04-15 DIAGNOSIS — M19.049 OSTEOARTHRITIS OF HAND, UNSPECIFIED LATERALITY, UNSPECIFIED OSTEOARTHRITIS TYPE: ICD-10-CM

## 2024-04-15 DIAGNOSIS — I10 PRIMARY HYPERTENSION: ICD-10-CM

## 2024-04-15 DIAGNOSIS — Z79.1 NSAID LONG-TERM USE: ICD-10-CM

## 2024-04-15 DIAGNOSIS — M05.9 SEROPOSITIVE RHEUMATOID ARTHRITIS: Primary | ICD-10-CM

## 2024-04-15 DIAGNOSIS — M79.7 FIBROMYALGIA SYNDROME: ICD-10-CM

## 2024-04-15 DIAGNOSIS — K21.9 GASTROESOPHAGEAL REFLUX DISEASE WITHOUT ESOPHAGITIS: ICD-10-CM

## 2024-04-15 DIAGNOSIS — G47.00 INSOMNIA, UNSPECIFIED TYPE: ICD-10-CM

## 2024-04-15 PROCEDURE — 3077F SYST BP >= 140 MM HG: CPT | Mod: CPTII,S$GLB,,

## 2024-04-15 PROCEDURE — 1159F MED LIST DOCD IN RCRD: CPT | Mod: CPTII,S$GLB,,

## 2024-04-15 PROCEDURE — 4010F ACE/ARB THERAPY RXD/TAKEN: CPT | Mod: CPTII,S$GLB,,

## 2024-04-15 PROCEDURE — 3008F BODY MASS INDEX DOCD: CPT | Mod: CPTII,S$GLB,,

## 2024-04-15 PROCEDURE — 99214 OFFICE O/P EST MOD 30 MIN: CPT | Mod: S$GLB,,,

## 2024-04-15 PROCEDURE — 3080F DIAST BP >= 90 MM HG: CPT | Mod: CPTII,S$GLB,,

## 2024-04-15 PROCEDURE — 99999 PR PBB SHADOW E&M-EST. PATIENT-LVL V: CPT | Mod: PBBFAC,,,

## 2024-04-15 RX ORDER — DICLOFENAC SODIUM 10 MG/G
2 GEL TOPICAL 4 TIMES DAILY
Qty: 100 G | Refills: 5 | Status: SHIPPED | OUTPATIENT
Start: 2024-04-15

## 2024-04-15 RX ORDER — HYDROXYCHLOROQUINE SULFATE 200 MG/1
200 TABLET, FILM COATED ORAL 2 TIMES DAILY
Qty: 60 TABLET | Refills: 5 | Status: SHIPPED | OUTPATIENT
Start: 2024-04-15

## 2024-04-15 RX ORDER — DICLOFENAC SODIUM 50 MG/1
50 TABLET, DELAYED RELEASE ORAL DAILY PRN
Qty: 30 TABLET | Refills: 5 | Status: SHIPPED | OUTPATIENT
Start: 2024-04-15

## 2024-04-15 RX ORDER — OMEPRAZOLE 40 MG/1
40 CAPSULE, DELAYED RELEASE ORAL DAILY
Qty: 30 CAPSULE | Refills: 11 | Status: SHIPPED | OUTPATIENT
Start: 2024-04-15 | End: 2025-04-15

## 2024-04-15 NOTE — ASSESSMENT & PLAN NOTE
Previously prescribed Ambien 10 mg nightly- He is aware he is unable to get any future refills for this medication or any scheduled medication since Dr. Hess is no longer practicing here. Discussed options of discussing other insomnia treatment options with PCP or follow Dr. Hess. He requested the information to Dr. Hess's clinic.    Avoid caffeine, alcohol and stimulants.  Power down electronic devices at least one hour prior to bedtime.  Keep room dark; use eye mask or relaxation sound machine to promote rest.  Sleep hygiene refers to actions that tend to improve and maintain good sleep:  Sleep as long as necessary to feel rested (usually seven to eight hours for adults) and then get out of bed  Maintain a regular sleep schedule, particularly a regular wake-up time in the morning  Avoid smoking or other nicotine intake, particularly during the evening

## 2024-04-15 NOTE — ASSESSMENT & PLAN NOTE
BP today is 151/93  Manual recheck: 150/90  Continue RX medications as prescribed by managing provider

## 2024-04-15 NOTE — ASSESSMENT & PLAN NOTE
He has been out of HCQ, not having refills d/t /rescheduled appointments. The plan is to restart hydroxychloroquine 200 mg b.i.d.  Continue diclofenac 50 mg daily PRN for pain (rarely takes this)  Continue omeprazole 40 mg daily      Plaquenil Eye Exam:   Labs completed last week with PCP and available in the EMR for review

## 2024-05-27 ENCOUNTER — OFFICE VISIT (OUTPATIENT)
Dept: FAMILY MEDICINE | Facility: CLINIC | Age: 44
End: 2024-05-27
Payer: COMMERCIAL

## 2024-05-27 VITALS
TEMPERATURE: 98 F | HEART RATE: 90 BPM | DIASTOLIC BLOOD PRESSURE: 74 MMHG | BODY MASS INDEX: 28.16 KG/M2 | WEIGHT: 169 LBS | SYSTOLIC BLOOD PRESSURE: 121 MMHG | RESPIRATION RATE: 18 BRPM | OXYGEN SATURATION: 100 % | HEIGHT: 65 IN

## 2024-05-27 DIAGNOSIS — F41.1 GAD (GENERALIZED ANXIETY DISORDER): ICD-10-CM

## 2024-05-27 DIAGNOSIS — Z00.00 ANNUAL PHYSICAL EXAM: Primary | ICD-10-CM

## 2024-05-27 DIAGNOSIS — I10 PRIMARY HYPERTENSION: ICD-10-CM

## 2024-05-27 DIAGNOSIS — M05.9 SEROPOSITIVE RHEUMATOID ARTHRITIS: ICD-10-CM

## 2024-05-27 LAB
BACTERIA #/AREA URNS AUTO: ABNORMAL /HPF
BILIRUB UR QL STRIP.AUTO: NEGATIVE
CLARITY UR: CLEAR
COLOR UR AUTO: YELLOW
GLUCOSE UR QL STRIP: NORMAL
HGB UR QL STRIP: NEGATIVE
HYALINE CASTS #/AREA URNS LPF: ABNORMAL /LPF
KETONES UR QL STRIP: NEGATIVE
LEUKOCYTE ESTERASE UR QL STRIP: NEGATIVE
MUCOUS THREADS URNS QL MICRO: ABNORMAL /LPF
NITRITE UR QL STRIP: NEGATIVE
PH UR STRIP: 6 [PH]
PROT UR QL STRIP: ABNORMAL
RBC #/AREA URNS AUTO: ABNORMAL /HPF
SP GR UR STRIP.AUTO: 1.02 (ref 1–1.03)
SQUAMOUS #/AREA URNS LPF: ABNORMAL /HPF
TSH SERPL-ACNC: 2.63 UIU/ML (ref 0.35–4.94)
UROBILINOGEN UR STRIP-ACNC: ABNORMAL
WBC #/AREA URNS AUTO: ABNORMAL /HPF

## 2024-05-27 PROCEDURE — 4010F ACE/ARB THERAPY RXD/TAKEN: CPT | Mod: CPTII,,, | Performed by: NURSE PRACTITIONER

## 2024-05-27 PROCEDURE — 3074F SYST BP LT 130 MM HG: CPT | Mod: CPTII,,, | Performed by: NURSE PRACTITIONER

## 2024-05-27 PROCEDURE — 99215 OFFICE O/P EST HI 40 MIN: CPT | Mod: PBBFAC | Performed by: NURSE PRACTITIONER

## 2024-05-27 PROCEDURE — 3078F DIAST BP <80 MM HG: CPT | Mod: CPTII,,, | Performed by: NURSE PRACTITIONER

## 2024-05-27 PROCEDURE — 1160F RVW MEDS BY RX/DR IN RCRD: CPT | Mod: CPTII,,, | Performed by: NURSE PRACTITIONER

## 2024-05-27 PROCEDURE — 1159F MED LIST DOCD IN RCRD: CPT | Mod: CPTII,,, | Performed by: NURSE PRACTITIONER

## 2024-05-27 PROCEDURE — 84443 ASSAY THYROID STIM HORMONE: CPT | Performed by: NURSE PRACTITIONER

## 2024-05-27 PROCEDURE — 99396 PREV VISIT EST AGE 40-64: CPT | Mod: S$PBB,,, | Performed by: NURSE PRACTITIONER

## 2024-05-27 PROCEDURE — 81001 URINALYSIS AUTO W/SCOPE: CPT | Performed by: NURSE PRACTITIONER

## 2024-05-27 PROCEDURE — 36415 COLL VENOUS BLD VENIPUNCTURE: CPT | Performed by: NURSE PRACTITIONER

## 2024-05-27 PROCEDURE — 3008F BODY MASS INDEX DOCD: CPT | Mod: CPTII,,, | Performed by: NURSE PRACTITIONER

## 2024-05-27 RX ORDER — HYDROXYZINE PAMOATE 25 MG/1
25 CAPSULE ORAL 2 TIMES DAILY PRN
Qty: 30 CAPSULE | Refills: 11 | Status: SHIPPED | OUTPATIENT
Start: 2024-05-27

## 2024-05-27 RX ORDER — NIFEDIPINE 60 MG/1
60 TABLET, EXTENDED RELEASE ORAL DAILY
Qty: 90 TABLET | Refills: 3 | Status: SHIPPED | OUTPATIENT
Start: 2024-05-27 | End: 2025-05-27

## 2024-05-27 RX ORDER — LOSARTAN POTASSIUM 100 MG/1
100 TABLET ORAL DAILY
Qty: 90 TABLET | Refills: 3 | Status: SHIPPED | OUTPATIENT
Start: 2024-05-27

## 2024-05-27 NOTE — PATIENT INSTRUCTIONS
Jeffrey Estrada,     If you are due for any health screening(s) below please notify me so we can arrange them to be ordered and scheduled. Most healthy patients at your age complete them, but you are free to accept or refuse.     If you can't do it, I'll definitely understand. If you can, I'd certainly appreciate it!    All of your core healthy metrics are met.      Were here to help you quit smoking     Our records indicated that you are still smoking. One of the best things you can do for your health is to stop smoking and we are here to help.     Talk with your provider about our Smoking Cessation Program and how we can support you on your journey.

## 2024-05-27 NOTE — ASSESSMENT & PLAN NOTE
Continue hydroxychloroquine 200 mg b.i.d.  Continue diclofenac 50 mg daily PRN for pain (rarely takes this)  Continue omeprazole 40 mg daily      Plaquenil Eye Exam:  Ophthalmology at [facility] clinic referral initiated.

## 2024-05-27 NOTE — PROGRESS NOTES
Patient Name: Sean Dunham   : 1980  MRN: [id number]     SUBJECTIVE DATA:    CHIEF COMPLAINT:   Sean Dunham is a 43 y.o. male who presents to clinic today with Follow-up      HPI:  43-year-old male presents to the clinic to complete his yearly wellness exam with labs.  Previous lab from 2024 reviewed at bedside with patient, within normal range.  Past medical history generalized anxiety disorder, seropositive rheumatoid arthritis, hypertension, GERD, vaping nicotine dependence.    Social Determinants of Health     Tobacco Use: High Risk (2024)    Patient History     Smoking Tobacco Use: Some Days     Smokeless Tobacco Use: Never     Passive Exposure: Not on file   Alcohol Use: Alcohol Misuse (2023)    AUDIT-C     Frequency of Alcohol Consumption: 2-3 times a week     Average Number of Drinks: 3 or 4     Frequency of Binge Drinking: Less than monthly   Financial Resource Strain: Low Risk  (2023)    Overall Financial Resource Strain (CARDIA)     Difficulty of Paying Living Expenses: Not hard at all   Food Insecurity: No Food Insecurity (2023)    Hunger Vital Sign     Worried About Running Out of Food in the Last Year: Never true     Ran Out of Food in the Last Year: Never true   Transportation Needs: No Transportation Needs (2023)    PRAPARE - Transportation     Lack of Transportation (Medical): No     Lack of Transportation (Non-Medical): No   Physical Activity: Insufficiently Active (2023)    Exercise Vital Sign     Days of Exercise per Week: 1 day     Minutes of Exercise per Session: 20 min   Stress: No Stress Concern Present (2023)    [employer] - Occupational Stress Questionnaire     Feeling of Stress : Only a little   Housing Stability: Low Risk  (2023)    Housing Stability Vital Sign     Unable to Pay for Housing in the Last Year: No     Number of Places Lived in the Last Year: 1     Unstable Housing in the Last Year: No  "  Depression: Low Risk  (4/15/2024)    Depression     Last PHQ-4: Flowsheet Data: 0   Utilities: Not At Risk (5/27/2024)    [facility] Utilities     Threatened with loss of utilities: No   Health Literacy: Adequate Health Literacy (5/27/2024)     Health Literacy     Frequency of need for help with medical instructions: Never   Social Isolation: Not on file     Car safety:  Seat belt used all the time.  Water:  Stay hydrated with fluids, drink 6-8 cups of water daily.  Tobacco use:  Patient agreed to stop smoking, smoke cessation counseling referral initiated.  Alcohol:  Drink in moderation.  Decrease amount of alcohol consuming, discussed health risk, patient verbalized  Exercise: exercise 30 minutes a day up to 5 days a week.  Stay active.  Lose weight.  Nutrition:  Follow low-cholesterol, low-fat, low-salt diet.  Eat fresh fruits and vegetables.  Keep in mind portion size.  Dental:  Patient does follow-up with a dentist yearly.  Ophthalmology:  Patient does not follow-up with ophthalmologist yearly.  Ophthalmology referral initiated, patient is on Plaquenil.  Cervical cancer screening exam referral initiated.  Immunizations:  Declines.  Lab work work drawn today will notify of test results when they become available.         ALLERGIES: Review of patient's allergies indicates:  No Known Allergies      ROS:  Review of Systems   All other systems reviewed and are negative.        OBJECTIVE DATA:  Vital signs  Vitals:    05/27/24 0751   BP: 121/74   Pulse: 90   Resp: 18   Temp: 97.6 °F (36.4 °C)   TempSrc: Oral   SpO2: 100%   Weight: 76.7 kg (169 lb)   Height: 5' 5" (1.651 m)      Body mass index is 28.12 kg/m².    PHYSICAL EXAM:   Physical Exam  Constitutional:       General: He is awake. He is not in acute distress.     Appearance: Normal appearance. He is well-developed, well-groomed and overweight. He is not ill-appearing, toxic-appearing or diaphoretic.   HENT:      Head: Normocephalic and atraumatic.      Right " Ear: Tympanic membrane, ear canal and external ear normal.      Left Ear: Tympanic membrane, ear canal and external ear normal.      Nose: Nose normal.      Mouth/Throat:      Lips: Pink.      Mouth: Mucous membranes are moist.      Tongue: No lesions. Tongue does not deviate from midline.      Palate: No mass and lesions.      Pharynx: Oropharynx is clear. Uvula midline.      Tonsils: No tonsillar exudate or tonsillar abscesses. 1+ on the right. 1+ on the left.   Eyes:      General: Lids are normal. Gaze aligned appropriately. No scleral icterus.     Extraocular Movements: Extraocular movements intact.      Conjunctiva/sclera: Conjunctivae normal.      Pupils: Pupils are equal, round, and reactive to light.   Neck:      Thyroid: No thyroid mass, thyromegaly or thyroid tenderness.      Trachea: Trachea and phonation normal.   Cardiovascular:      Rate and Rhythm: Normal rate and regular rhythm.      Pulses: Normal pulses.           Carotid pulses are 2+ on the right side and 2+ on the left side.       Radial pulses are 2+ on the right side and 2+ on the left side.        Femoral pulses are 2+ on the right side and 2+ on the left side.       Dorsalis pedis pulses are 2+ on the right side and 2+ on the left side.        Posterior tibial pulses are 2+ on the right side and 2+ on the left side.      Heart sounds: Normal heart sounds.   Pulmonary:      Effort: Pulmonary effort is normal.      Breath sounds: Normal breath sounds and air entry.   Abdominal:      General: Bowel sounds are normal. There is no distension.      Palpations: Abdomen is soft. There is no mass.      Tenderness: There is no abdominal tenderness. There is no right CVA tenderness, left CVA tenderness, guarding or rebound.      Hernia: No hernia is present.   Genitourinary:     Comments: Exam deferred.  Patient denies any hernia issues.  Patient denies any urinary or bowel habit changes.  Musculoskeletal:         General: No tenderness. Normal range  of motion.      Cervical back: Normal range of motion.      Right lower leg: No edema.      Left lower leg: No edema.   Lymphadenopathy:      Cervical: No cervical adenopathy.   Skin:     General: Skin is warm.      Capillary Refill: Capillary refill takes less than 2 seconds.      Findings: No rash.   Neurological:      General: No focal deficit present.      Mental Status: He is alert and oriented to person, place, and time. Mental status is at baseline.      GCS: GCS eye subscore is 4. GCS verbal subscore is 5. GCS motor subscore is 6.      Cranial Nerves: Cranial nerves 2-12 are intact. No cranial nerve deficit.      Sensory: Sensation is intact. No sensory deficit.      Motor: Motor function is intact. No weakness.      Coordination: Coordination is intact. Coordination normal.      Gait: Gait is intact. Gait normal.   Psychiatric:         Attention and Perception: Attention and perception normal.         Mood and Affect: Mood and affect normal.         Speech: Speech normal.         Behavior: Behavior normal. Behavior is cooperative.         Thought Content: Thought content normal.         Cognition and Memory: Cognition and memory normal.         Judgment: Judgment normal.          ASSESSMENT/PLAN:  1. Annual physical exam  -     TSH  -     Urinalysis, Reflex to Urine Culture  -     Ambulatory referral/consult to Smoking Cessation Program; Future; Expected date: 06/03/2024  -     Ambulatory referral/consult to Ophthalmology; Future; Expected date: 06/03/2024    2. Primary hypertension  Assessment & Plan:  Controlled.  Current blood pressure 121/74.  Continue nifedipine 60 mg p.o. once daily.  Continue losartan 100 mg p.o. once daily.  Continue hydralazine 25 mg p.o. p.r.n. if systolic blood pressure above 160 and diastolic blood pressure above 100.  Continue to follow low-salt diet less than 3 g per day .  Stay hydrated with water.  Quit vaping.  Smoke cessation counseling initiated as agreed.  Stay  physically active and exercise at least 30 minutes a day up to 5 days a week as simple as brisk walking.  Medications refilled.      Orders:  -     NIFEdipine (PROCARDIA-XL) 60 MG (OSM) 24 hr tablet; Take 1 tablet (60 mg total) by mouth once daily.  Dispense: 90 tablet; Refill: 3  -     losartan (COZAAR) 100 MG tablet; Take 1 tablet (100 mg total) by mouth once daily.  Dispense: 90 tablet; Refill: 3    3. WALTER (generalized anxiety disorder)  Assessment & Plan:  Rx hydroxyzine 25 mg tablet twice a day only as needed for anxiety.  Precaution discussed.  Patient verbalized.    Orders:  -     hydrOXYzine pamoate (VISTARIL) 25 MG Cap; Take 1 capsule (25 mg total) by mouth 2 (two) times daily as needed (Anxiety).  Dispense: 30 capsule; Refill: 11    4. Seropositive rheumatoid arthritis  Assessment & Plan:  Continue hydroxychloroquine 200 mg b.i.d.  Continue diclofenac 50 mg daily PRN for pain (rarely takes this)  Continue omeprazole 40 mg daily      Plaquenil Eye Exam:  Ophthalmology at [facility] clinic referral initiated.       Orders:  -     Ambulatory referral/consult to Ophthalmology; Future; Expected date: 06/03/2024           RESULTS:  Recent Results (from the past 1008 hour(s))   TSH    Collection Time: 05/27/24  8:23 AM   Result Value Ref Range    TSH 2.627 0.350 - 4.940 uIU/mL   Urinalysis, Reflex to Urine Culture    Collection Time: 05/27/24  8:23 AM    Specimen: Urine   Result Value Ref Range    Color, UA Yellow Yellow, Light-Yellow, Dark Yellow, [place], Straw    Appearance, UA Clear Clear    Specific Gravity, UA 1.023 1.005 - 1.030    pH, UA 6.0 5.0 - 8.5    Protein, UA Trace (A) Negative    Glucose, UA Normal Negative, Normal    Ketones, UA Negative Negative    Blood, UA Negative Negative    Bilirubin, UA Negative Negative    Urobilinogen, UA 1+ (A) 0.2, 1.0, Normal    Nitrites, UA Negative Negative    Leukocyte Esterase, UA Negative Negative    WBC, UA 0-5 None Seen, 0-2, 3-5, 0-5 /HPF    Bacteria, UA None Seen None  Seen /HPF    Squamous Epithelial Cells, UA Trace (A) None Seen /HPF    Mucous, UA Occ (A) None Seen /LPF    Hyaline Casts, UA None Seen None Seen /lpf    RBC, UA 0-5 None Seen, 0-2, 3-5, 0-5 /HPF         Follow Up:  Follow up in about 1 year (around 5/28/2025).      Previous medical history/lab work/radiology reviewed and considered during medical management decisions.   Medication list reviewed and medication reconciliation performed.  Patient was provided  and care about his/her current diagnosis (es) and medications including risk/benefit and side effects/adverse events, over the counter medication uses/doses, home self-care and contact precautions,  and red flags and indications for when to seek immediate medical attention.   Patient was advised to continue compliance with current medication list and medical recommendations.  Patient dvised continued compliance with recommended eating habits/ diets for medical conditions and exercise 150 minutes/ week (if possible) for medical condition (s).  Educational handouts and instructions on selected disease management in AVS (After Visit Summary).    All of the patient's questions were answered to patient's satisfaction.   The patient was receptive, expressed verbal understanding and agreement the above plan.      This note was created with the assistance of a voice recognition software or phone dictation. There may be transcription errors as a result of using this technology however minimal. Effort has been made to assure accuracy of transcription but any obvious errors or omissions should be clarified with the author of the document

## 2024-05-27 NOTE — ASSESSMENT & PLAN NOTE
Rx hydroxyzine 25 mg tablet twice a day only as needed for anxiety.  Precaution discussed.  Patient verbalized.

## 2024-05-27 NOTE — ASSESSMENT & PLAN NOTE
Controlled.  Current blood pressure 121/74.  Continue nifedipine 60 mg p.o. once daily.  Continue losartan 100 mg p.o. once daily.  Continue hydralazine 25 mg p.o. p.r.n. if systolic blood pressure above 160 and diastolic blood pressure above 100.  Continue to follow low-salt diet less than 3 g per day .  Stay hydrated with water.  Quit vaping.  Smoke cessation counseling initiated as agreed.  Stay physically active and exercise at least 30 minutes a day up to 5 days a week as simple as brisk walking.  Medications refilled.

## 2024-05-29 NOTE — PROGRESS NOTES
PLEASE CALL PATIENTS WITH RESULTS,   Urinalysis traces of protein.  Stay hydrated with water, avoid alcoholic beverages if possible.  Drink 6-8 cups of water a day.  Thyroid function test within normal range.

## 2024-05-30 ENCOUNTER — TELEPHONE (OUTPATIENT)
Dept: FAMILY MEDICINE | Facility: CLINIC | Age: 44
End: 2024-05-30
Payer: COMMERCIAL

## 2024-05-30 NOTE — TELEPHONE ENCOUNTER
Called patient to give results. Patient verbalized understanding. No additional questions at this time.     ----- Message from CLAUDIA López sent at 5/29/2024  4:46 PM CDT -----  PLEASE CALL PATIENTS WITH RESULTS,   Urinalysis traces of protein.  Stay hydrated with water, avoid alcoholic beverages if possible.  Drink 6-8 cups of water a day.  Thyroid function test within normal range.

## 2024-05-31 ENCOUNTER — TELEPHONE (OUTPATIENT)
Dept: HEMATOLOGY/ONCOLOGY | Facility: CLINIC | Age: 44
End: 2024-05-31
Payer: COMMERCIAL

## 2024-05-31 DIAGNOSIS — F41.1 GAD (GENERALIZED ANXIETY DISORDER): ICD-10-CM

## 2024-05-31 DIAGNOSIS — D75.1 SECONDARY ERYTHROCYTOSIS: Primary | ICD-10-CM

## 2024-06-03 ENCOUNTER — OFFICE VISIT (OUTPATIENT)
Dept: HEMATOLOGY/ONCOLOGY | Facility: CLINIC | Age: 44
End: 2024-06-03
Payer: COMMERCIAL

## 2024-06-03 ENCOUNTER — INFUSION (OUTPATIENT)
Dept: INFUSION THERAPY | Facility: HOSPITAL | Age: 44
End: 2024-06-03
Attending: INTERNAL MEDICINE
Payer: COMMERCIAL

## 2024-06-03 ENCOUNTER — APPOINTMENT (OUTPATIENT)
Dept: HEMATOLOGY/ONCOLOGY | Facility: CLINIC | Age: 44
End: 2024-06-03
Payer: COMMERCIAL

## 2024-06-03 VITALS
SYSTOLIC BLOOD PRESSURE: 140 MMHG | RESPIRATION RATE: 16 BRPM | HEIGHT: 65 IN | HEART RATE: 85 BPM | OXYGEN SATURATION: 99 % | WEIGHT: 168.88 LBS | DIASTOLIC BLOOD PRESSURE: 86 MMHG | TEMPERATURE: 98 F | BODY MASS INDEX: 28.14 KG/M2

## 2024-06-03 VITALS
HEART RATE: 99 BPM | BODY MASS INDEX: 28.16 KG/M2 | HEIGHT: 65 IN | SYSTOLIC BLOOD PRESSURE: 147 MMHG | TEMPERATURE: 99 F | DIASTOLIC BLOOD PRESSURE: 99 MMHG | OXYGEN SATURATION: 100 % | WEIGHT: 169 LBS | RESPIRATION RATE: 20 BRPM

## 2024-06-03 DIAGNOSIS — F41.1 GAD (GENERALIZED ANXIETY DISORDER): ICD-10-CM

## 2024-06-03 DIAGNOSIS — D75.1 SECONDARY POLYCYTHEMIA: Primary | ICD-10-CM

## 2024-06-03 DIAGNOSIS — I10 PRIMARY HYPERTENSION: ICD-10-CM

## 2024-06-03 DIAGNOSIS — D75.1 SECONDARY ERYTHROCYTOSIS: Primary | ICD-10-CM

## 2024-06-03 DIAGNOSIS — E87.6 HYPOKALEMIA: ICD-10-CM

## 2024-06-03 DIAGNOSIS — D75.1 SECONDARY ERYTHROCYTOSIS: ICD-10-CM

## 2024-06-03 LAB
ABS NEUT CALC (OHS): 2.75 X10(3)/MCL (ref 2.1–9.2)
ALBUMIN SERPL-MCNC: 3.7 G/DL (ref 3.5–5)
ALBUMIN/GLOB SERPL: 0.9 RATIO (ref 1.1–2)
ALP SERPL-CCNC: 124 UNIT/L (ref 40–150)
ALT SERPL-CCNC: 63 UNIT/L (ref 0–55)
ANION GAP SERPL CALC-SCNC: 11 MEQ/L
ANISOCYTOSIS BLD QL SMEAR: SLIGHT
AST SERPL-CCNC: 64 UNIT/L (ref 5–34)
BILIRUB SERPL-MCNC: 0.2 MG/DL
BUN SERPL-MCNC: 5.5 MG/DL (ref 8.9–20.6)
CALCIUM SERPL-MCNC: 9.5 MG/DL (ref 8.4–10.2)
CHLORIDE SERPL-SCNC: 108 MMOL/L (ref 98–107)
CO2 SERPL-SCNC: 23 MMOL/L (ref 22–29)
CREAT SERPL-MCNC: 0.84 MG/DL (ref 0.73–1.18)
CREAT/UREA NIT SERPL: 7
EOSINOPHIL NFR BLD MANUAL: 0.14 X10(3)/MCL (ref 0–0.9)
EOSINOPHIL NFR BLD MANUAL: 3 % (ref 0–8)
ERYTHROCYTE [DISTWIDTH] IN BLOOD BY AUTOMATED COUNT: 14.2 % (ref 11.5–17)
GFR SERPLBLD CREATININE-BSD FMLA CKD-EPI: >60 ML/MIN/1.73/M2
GLOBULIN SER-MCNC: 4.2 GM/DL (ref 2.4–3.5)
GLUCOSE SERPL-MCNC: 106 MG/DL (ref 74–100)
HCT VFR BLD AUTO: 47.6 % (ref 42–52)
HGB BLD-MCNC: 16.2 G/DL (ref 14–18)
LYMPHOCYTES NFR BLD MANUAL: 1.55 X10(3)/MCL
LYMPHOCYTES NFR BLD MANUAL: 32 % (ref 13–40)
MCH RBC QN AUTO: 30.2 PG (ref 27–31)
MCHC RBC AUTO-ENTMCNC: 34 G/DL (ref 33–36)
MCV RBC AUTO: 88.8 FL (ref 80–94)
MONOCYTES NFR BLD MANUAL: 0.39 X10(3)/MCL (ref 0.1–1.3)
MONOCYTES NFR BLD MANUAL: 8 % (ref 2–11)
NEUTROPHILS NFR BLD MANUAL: 57 % (ref 47–80)
NRBC BLD AUTO-RTO: 0 %
PLATELET # BLD AUTO: 260 X10(3)/MCL (ref 130–400)
PLATELET # BLD EST: NORMAL 10*3/UL
PMV BLD AUTO: 9.6 FL (ref 7.4–10.4)
POLYCHROMASIA BLD QL SMEAR: SLIGHT
POTASSIUM SERPL-SCNC: 3.1 MMOL/L (ref 3.5–5.1)
PROT SERPL-MCNC: 7.9 GM/DL (ref 6.4–8.3)
RBC # BLD AUTO: 5.36 X10(6)/MCL (ref 4.7–6.1)
RBC MORPH BLD: ABNORMAL
SODIUM SERPL-SCNC: 142 MMOL/L (ref 136–145)
WBC # SPEC AUTO: 4.83 X10(3)/MCL (ref 4.5–11.5)

## 2024-06-03 PROCEDURE — 99195 PHLEBOTOMY: CPT

## 2024-06-03 PROCEDURE — 85007 BL SMEAR W/DIFF WBC COUNT: CPT

## 2024-06-03 PROCEDURE — 4010F ACE/ARB THERAPY RXD/TAKEN: CPT | Mod: CPTII,,, | Performed by: NURSE PRACTITIONER

## 2024-06-03 PROCEDURE — 1159F MED LIST DOCD IN RCRD: CPT | Mod: CPTII,,, | Performed by: NURSE PRACTITIONER

## 2024-06-03 PROCEDURE — 36415 COLL VENOUS BLD VENIPUNCTURE: CPT

## 2024-06-03 PROCEDURE — 3077F SYST BP >= 140 MM HG: CPT | Mod: CPTII,,, | Performed by: NURSE PRACTITIONER

## 2024-06-03 PROCEDURE — 99215 OFFICE O/P EST HI 40 MIN: CPT | Mod: S$PBB,,, | Performed by: NURSE PRACTITIONER

## 2024-06-03 PROCEDURE — 99214 OFFICE O/P EST MOD 30 MIN: CPT | Mod: PBBFAC,25 | Performed by: NURSE PRACTITIONER

## 2024-06-03 PROCEDURE — 3008F BODY MASS INDEX DOCD: CPT | Mod: CPTII,,, | Performed by: NURSE PRACTITIONER

## 2024-06-03 PROCEDURE — 3080F DIAST BP >= 90 MM HG: CPT | Mod: CPTII,,, | Performed by: NURSE PRACTITIONER

## 2024-06-03 PROCEDURE — 80053 COMPREHEN METABOLIC PANEL: CPT

## 2024-06-03 PROCEDURE — 1160F RVW MEDS BY RX/DR IN RCRD: CPT | Mod: CPTII,,, | Performed by: NURSE PRACTITIONER

## 2024-06-03 RX ORDER — POTASSIUM CHLORIDE 20 MEQ/1
20 TABLET, EXTENDED RELEASE ORAL 2 TIMES DAILY
Qty: 14 TABLET | Refills: 0 | Status: SHIPPED | OUTPATIENT
Start: 2024-06-03 | End: 2024-06-10

## 2024-06-03 RX ORDER — LIDOCAINE HYDROCHLORIDE 10 MG/ML
1 INJECTION, SOLUTION EPIDURAL; INFILTRATION; INTRACAUDAL; PERINEURAL ONCE
OUTPATIENT
Start: 2024-06-10 | End: 2024-06-10

## 2024-06-03 RX ORDER — LIDOCAINE HYDROCHLORIDE 10 MG/ML
1 INJECTION, SOLUTION EPIDURAL; INFILTRATION; INTRACAUDAL; PERINEURAL ONCE
Status: CANCELLED | OUTPATIENT
Start: 2024-06-03 | End: 2024-06-03

## 2024-06-03 RX ORDER — LIDOCAINE HYDROCHLORIDE 10 MG/ML
1 INJECTION, SOLUTION EPIDURAL; INFILTRATION; INTRACAUDAL; PERINEURAL ONCE
Status: DISCONTINUED | OUTPATIENT
Start: 2024-06-03 | End: 2024-06-03 | Stop reason: HOSPADM

## 2024-06-03 RX ORDER — ASPIRIN 81 MG/1
81 TABLET ORAL DAILY
Qty: 30 TABLET | Refills: 11 | Status: SHIPPED | OUTPATIENT
Start: 2024-06-03 | End: 2025-06-03

## 2024-06-03 NOTE — NURSING
945 Arrive from [facility] provider visit for tx 3 Therapeutic Phlebotomy. 1018 Phlebotomy started. 1110 Phlebotomy ended had difficulty with removal had to flush the IV line several time total amount removed was 300cc.

## 2024-06-03 NOTE — PROGRESS NOTES
Problem List:  Severe erythrocytosis    Current Treatment:  ASA 81mg po daily.  Therapeutic phlebotomy to keep Hct <45%    History of Present Illness:  The patient is a pleasant 36-year-old -American man. For a full, detailed history, please see the note dated 8/9/2021.    Interval History 6/3/2024: Patient presents today for scheduled follow up for erythrocytosis.  Has report symptoms of fatigue.  He denies any excessive itching, shortness of breath, chest pain or palpitations.  He reports compliance with aspirin 81 mg daily. Lab work reviewed with patient,HCT elevated 47.6.  Slightly elevated liver enzymes.  We discussed plan of care and follow-up    Review of Systems   Constitutional:  Positive for malaise/fatigue.   All other systems reviewed and are negative.    Physical Exam  Constitutional:       Appearance: Normal appearance.   HENT:      Head: Normocephalic.      Mouth/Throat:      Mouth: Mucous membranes are moist.   Eyes:      Pupils: Pupils are equal, round, and reactive to light.   Cardiovascular:      Rate and Rhythm: Normal rate and regular rhythm.      Pulses: Normal pulses.      Heart sounds: Normal heart sounds.   Pulmonary:      Effort: Pulmonary effort is normal.      Breath sounds: Normal breath sounds.   Abdominal:      General: Bowel sounds are normal.      Palpations: Abdomen is soft.   Musculoskeletal:         General: Normal range of motion.      Cervical back: Normal range of motion.   Skin:     General: Skin is warm and dry.      Capillary Refill: Capillary refill takes less than 2 seconds.   Neurological:      General: No focal deficit present.      Mental Status: He is alert and oriented to person, place, and time.   Psychiatric:         Attention and Perception: Attention normal.         Mood and Affect: Affect normal.         Speech: Speech normal.         Behavior: Behavior normal.         Thought Content: Thought content normal.         Lab Results   Component Value Date     WBC 4.83 06/03/2024    HGB 16.2 06/03/2024    HCT 47.6 06/03/2024    MCV 88.8 06/03/2024     06/03/2024       CMP  Sodium   Date Value Ref Range Status   06/03/2024 142 136 - 145 mmol/L Final     Potassium   Date Value Ref Range Status   06/03/2024 3.1 (L) 3.5 - 5.1 mmol/L Final     Chloride   Date Value Ref Range Status   06/03/2024 108 (H) 98 - 107 mmol/L Final     CO2   Date Value Ref Range Status   06/03/2024 23 22 - 29 mmol/L Final     Blood Urea Nitrogen   Date Value Ref Range Status   06/03/2024 5.5 (L) 8.9 - 20.6 mg/dL Final     Creatinine   Date Value Ref Range Status   06/03/2024 0.84 0.73 - 1.18 mg/dL Final     Calcium   Date Value Ref Range Status   06/03/2024 9.5 8.4 - 10.2 mg/dL Final     Albumin   Date Value Ref Range Status   06/03/2024 3.7 3.5 - 5.0 g/dL Final     Bilirubin Total   Date Value Ref Range Status   06/03/2024 0.2 <=1.5 mg/dL Final     ALP   Date Value Ref Range Status   06/03/2024 124 40 - 150 unit/L Final     AST   Date Value Ref Range Status   06/03/2024 64 (H) 5 - 34 unit/L Final     ALT   Date Value Ref Range Status   06/03/2024 63 (H) 0 - 55 unit/L Final     Estimated GFR-Non    Date Value Ref Range Status   04/18/2022 96 >=90        Vitals & Measurements  Vitals:    06/03/24 0904   BP: (!) 147/99   Pulse: 99   Resp: 20   Temp: 99 °F (37.2 °C)     Assessment:  1. 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 Erythropoietin level is not suppressed; this is a point against polycythemia vera. Bone marrow examination does not suggest polycythemia vera. Calretinin mutation negative in bone marrow. JANESSA-2 mutation negative (both JANESSA-2 V617F, as well as exons 12-15 are negative)  -->  Polycythemia vera ruled out   (erythropoietin level not suppressed; JANESSA mutation negative)  Patient has some kind of  secondary erythrocytosis   -Phlebotomized: 11/20/2017; 12/11/2017; 01/02/2018; 03/12/2019 (H/H 18.6/51.4); 07/15/2019 (H/H 18.0/52.2); 10/14/2019 (H/H 18.1/53.6)  -H/H: 16.2/48.8 (12/09/2019); 16.2/48.8 (11/11/2019; 16.0/48.1 (10/21/2019); 18.1/53.6 (10/08/2019), etc.  -After last therapeutic phlebotomy on 10/14/2019 (H/H 18.1/23.6), his H/H has remained stable, 16.6/49.9 as of 02/10/2020  -Therapeutic phlebotomy 605 cc on 04/21/2020 (H/H 17.7/52.2)  -07/06/2020: H/H 17.4/51.6  -Therapeutic phlebotomy 07/09/2020 (H/H 17.4/51.6), 07/20/2020 (H/H 15.3/45.3), 07/27/2020 (H/H 14.8/45.2)  -08/03/2020: H/H 14.0/42.2 (phlebotomy held)  -08/11/2020: H/H 13.9/41.4 (phlebotomy held)  -09/14/2020: CBC reviewed. H/H 14.6/44.1.  -Since 09/15/2020: Therapeutic phlebotomy: 12/14/2020, 04/19/2021, 08/16/2021, 11/22/2021, 03/21/2022, 04/18/2022        -11/28/2022:  H/H 13.9/45.0   -S/P therapeutic phlebotomy 11/28/2022, per patient preference (534 mL phlebotomized)  -01/03/2023:  H/H 13.1/43.7  -01/30/2023:  H/H 13.0/41.5  -04/24/2023:  Hemoglobin 13.7.  Hematocrit 42.7.  -07/31/2023:  Hemoglobin 15.1.  Hematocrit 47.6; phlebotomized 505 cc  -09/11/2023:  Hemoglobin 13.8.  Hematocrit 45.1.  -11/20/2023:  Hemoglobin 14.9.  Hematocrit 46.3  -therapeutic phlebotomy performed 11/20/2023 (520 cc blood)  -01/22/2024:  CBC reviewed; hemoglobin 13.7, hematocrit 41.9   -11/20/2023:  H/H 14.9/46.3  -03/25/2024:  WBC 4.26, hemoglobin 14.2, hematocrit 41.8, platelets 241 K; CMP reviewed, AST 67  6/3/2024: H/H 16.2,47.6 therapeutic phlebotomy     Plan:  Secondary erythrocytosis  -09/15/2020: No indication of phlebotomy at this time  -Recheck CBC in 1 month to assess the need of therapeutic phlebotomy, then follow-up visit    -No evidence of polycythemia vera  -Has secondary polycythemia  -No symptoms of hyperviscosity state  -However, has behaved more or less like polycythemia vera (persistently elevated H/H), requiring periodic therapeutic  phlebotomy treatments    -Therapeutic phlebotomy as needed  -Target hematocrit <45  -Hold phlebotomy if hematocrit <45  -Continue baby aspirin 81 mg p.o. daily    Hct 47.6; therefore phlebotomy today  Therapeutic phlebotomy to keep Hct <45%.  fu w/np in 1 month with lab work + infusion for possible phlebotomy  Continue ASA 81mg PO daily.    -He has quit smoking      Hypokalemia  6/3/24: K+ level 3.1  Start KCL 20meq po bid x 7 days      Discussion:  He has secondary erythrocytosis of unclear etiology.   No evidence of polycythemia vera (normal erythropoietin level and negative JANESSA-2 mutation rule out polycythemia vera).  However, in terms of persistence of elevated H/H, has behaved more or less like polycythemia vera.    Had no symptoms of hyperviscosity state at any time despite severe elevation of hemoglobin.    Regardless, to be on the safer side, will continue therapeutic phlebotomy as needed.

## 2024-06-03 NOTE — Clinical Note
phlebotomy today fu w/np 1 month on 7/1/24 with lab work + infusion for possible phlebotomy patient preference Monday appt only

## 2024-06-04 ENCOUNTER — TELEPHONE (OUTPATIENT)
Dept: HEMATOLOGY/ONCOLOGY | Facility: CLINIC | Age: 44
End: 2024-06-04
Payer: COMMERCIAL

## 2024-06-04 NOTE — TELEPHONE ENCOUNTER
Called patient to review lab work with him. Patient potasium was 3.1. Blanca Liao NP ordered KCL 20 meq po BID for 7 days. Medication has been sent to his pharmacy. Patient verbalized understanding.

## 2024-06-11 VITALS — DIASTOLIC BLOOD PRESSURE: 88 MMHG | SYSTOLIC BLOOD PRESSURE: 136 MMHG

## 2024-06-11 DIAGNOSIS — I10 PRIMARY HYPERTENSION: ICD-10-CM

## 2024-06-11 RX ORDER — HYDRALAZINE HYDROCHLORIDE 25 MG/1
TABLET, FILM COATED ORAL
Qty: 30 TABLET | Refills: 11 | Status: SHIPPED | OUTPATIENT
Start: 2024-06-11

## 2024-06-11 NOTE — ASSESSMENT & PLAN NOTE
Called patient over the phone, patient id self with accurate date of birth.  Blood pressure at home 136 over 88.  Patient currently compliance with hydralazine 25 mg tablet as needed with specific parameter to take medication.  Losartan 100 mg daily.  Nifedipine 60 mg p.o. daily.  Continue medications as prescribed.  Keep follow-up appointment as directed.

## 2024-06-20 ENCOUNTER — TELEPHONE (OUTPATIENT)
Dept: HEMATOLOGY/ONCOLOGY | Facility: CLINIC | Age: 44
End: 2024-06-20
Payer: COMMERCIAL

## 2024-06-24 ENCOUNTER — INFUSION (OUTPATIENT)
Dept: INFUSION THERAPY | Facility: HOSPITAL | Age: 44
End: 2024-06-24
Attending: INTERNAL MEDICINE
Payer: COMMERCIAL

## 2024-06-24 ENCOUNTER — OFFICE VISIT (OUTPATIENT)
Dept: HEMATOLOGY/ONCOLOGY | Facility: CLINIC | Age: 44
End: 2024-06-24
Payer: COMMERCIAL

## 2024-06-24 VITALS
RESPIRATION RATE: 18 BRPM | OXYGEN SATURATION: 100 % | DIASTOLIC BLOOD PRESSURE: 81 MMHG | SYSTOLIC BLOOD PRESSURE: 124 MMHG | HEART RATE: 86 BPM | DIASTOLIC BLOOD PRESSURE: 92 MMHG | TEMPERATURE: 98 F | WEIGHT: 167 LBS | OXYGEN SATURATION: 100 % | BODY MASS INDEX: 27.82 KG/M2 | HEART RATE: 81 BPM | TEMPERATURE: 98 F | SYSTOLIC BLOOD PRESSURE: 138 MMHG | HEIGHT: 65 IN

## 2024-06-24 DIAGNOSIS — D75.1 SECONDARY ERYTHROCYTOSIS: Primary | ICD-10-CM

## 2024-06-24 DIAGNOSIS — M05.9 SEROPOSITIVE RHEUMATOID ARTHRITIS: ICD-10-CM

## 2024-06-24 PROCEDURE — 3008F BODY MASS INDEX DOCD: CPT | Mod: CPTII,,, | Performed by: NURSE PRACTITIONER

## 2024-06-24 PROCEDURE — 4010F ACE/ARB THERAPY RXD/TAKEN: CPT | Mod: CPTII,,, | Performed by: NURSE PRACTITIONER

## 2024-06-24 PROCEDURE — 3075F SYST BP GE 130 - 139MM HG: CPT | Mod: CPTII,,, | Performed by: NURSE PRACTITIONER

## 2024-06-24 PROCEDURE — 3080F DIAST BP >= 90 MM HG: CPT | Mod: CPTII,,, | Performed by: NURSE PRACTITIONER

## 2024-06-24 PROCEDURE — 99215 OFFICE O/P EST HI 40 MIN: CPT | Mod: S$PBB,,, | Performed by: NURSE PRACTITIONER

## 2024-06-24 PROCEDURE — 99214 OFFICE O/P EST MOD 30 MIN: CPT | Mod: PBBFAC | Performed by: NURSE PRACTITIONER

## 2024-06-24 PROCEDURE — 1160F RVW MEDS BY RX/DR IN RCRD: CPT | Mod: CPTII,,, | Performed by: NURSE PRACTITIONER

## 2024-06-24 PROCEDURE — 1159F MED LIST DOCD IN RCRD: CPT | Mod: CPTII,,, | Performed by: NURSE PRACTITIONER

## 2024-06-24 PROCEDURE — 99195 PHLEBOTOMY: CPT

## 2024-06-24 RX ORDER — LIDOCAINE HYDROCHLORIDE 10 MG/ML
1 INJECTION, SOLUTION EPIDURAL; INFILTRATION; INTRACAUDAL; PERINEURAL ONCE
OUTPATIENT
Start: 2024-07-01 | End: 2024-07-01

## 2024-06-24 NOTE — NURSING
Infusion Note:  Pt has an appointment today for: Labs, Blanca Keshawn FNP, and Infusion    Therapeutic phlebotomy performed for Hct 45 (goal <45%) with symptoms. Pt reported fatigue and itchiness. He reported the nurse was unable to pull the full 500 cc's last time. Phlebotomized 502 cc's R arm today.    Next infusion appointment is for: Labs, Blanca Keshawn FNP, and Infusion   Pt aware his next appt is scheduled 5 weeks out.   Pt presents with right ankle pain secondary to falling while playing laser tag yesterday.

## 2024-06-24 NOTE — PROGRESS NOTES
Problem List:  Severe erythrocytosis    Current Treatment:  ASA 81mg po daily.  Therapeutic phlebotomy to keep Hct <45%    History of Present Illness:  The patient is a pleasant 36-year-old -American man. For a full, detailed history, please see the note dated 8/9/2021.    Interval History 6/24/2024: Patient presents alone for ongoing management of erythrocytosis.  Patient did receive phlebotomy 1 month ago for hematocrit 47.6.  Patient says he has not having very good experience with last phlebotomy as a smaller needle was used and only 250 cc was able to be removed.  Today lab work reviewed with the patient hematocrit 45.0 goal is <45%.  Patient reports feeling of being very sluggish and severe fatigue.  Elevated liver enzymes, patient says that he has not drink anymore alcohol than he normally does on an occasional basis.  Patient did recently start taking Plaquenil however patient says he was told to only take medication for a month and he has since completed treatment.  Discussed with the patient Plaquenil directions as ordered by his doctor.  Patient advised to get prescription refilled and follow up with the PCP or rheumatologist for further instruction with the Plaquenil.  We discussed plan of care and follow-up.      Review of Systems   Constitutional:  Positive for malaise/fatigue.   All other systems reviewed and are negative.    Physical Exam  Constitutional:       Appearance: Normal appearance.   HENT:      Head: Normocephalic.      Mouth/Throat:      Mouth: Mucous membranes are moist.   Eyes:      Pupils: Pupils are equal, round, and reactive to light.   Cardiovascular:      Rate and Rhythm: Normal rate and regular rhythm.      Pulses: Normal pulses.      Heart sounds: Normal heart sounds.   Pulmonary:      Effort: Pulmonary effort is normal.      Breath sounds: Normal breath sounds.   Abdominal:      General: Bowel sounds are normal.      Palpations: Abdomen is soft.   Musculoskeletal:          General: Normal range of motion.      Cervical back: Normal range of motion.   Skin:     General: Skin is warm and dry.      Capillary Refill: Capillary refill takes less than 2 seconds.   Neurological:      General: No focal deficit present.      Mental Status: He is alert and oriented to person, place, and time.   Psychiatric:         Attention and Perception: Attention normal.         Mood and Affect: Affect normal.         Speech: Speech normal.         Behavior: Behavior normal.         Thought Content: Thought content normal.         Lab Results   Component Value Date    WBC 4.34 (L) 06/24/2024    HGB 15.4 06/24/2024    HCT 45.0 06/24/2024    MCV 87.5 06/24/2024     06/24/2024       CMP  Sodium   Date Value Ref Range Status   06/24/2024 142 136 - 145 mmol/L Final     Potassium   Date Value Ref Range Status   06/24/2024 3.5 3.5 - 5.1 mmol/L Final     Chloride   Date Value Ref Range Status   06/24/2024 110 (H) 98 - 107 mmol/L Final     CO2   Date Value Ref Range Status   06/24/2024 21 (L) 22 - 29 mmol/L Final     Blood Urea Nitrogen   Date Value Ref Range Status   06/24/2024 5.7 (L) 8.9 - 20.6 mg/dL Final     Creatinine   Date Value Ref Range Status   06/24/2024 0.84 0.73 - 1.18 mg/dL Final     Calcium   Date Value Ref Range Status   06/24/2024 9.7 8.4 - 10.2 mg/dL Final     Albumin   Date Value Ref Range Status   06/24/2024 3.6 3.5 - 5.0 g/dL Final     Bilirubin Total   Date Value Ref Range Status   06/24/2024 0.2 <=1.5 mg/dL Final     ALP   Date Value Ref Range Status   06/24/2024 101 40 - 150 unit/L Final     AST   Date Value Ref Range Status   06/24/2024 77 (H) 5 - 34 unit/L Final     ALT   Date Value Ref Range Status   06/24/2024 84 (H) 0 - 55 unit/L Final     Estimated GFR-Non    Date Value Ref Range Status   04/18/2022 96 >=90        Vitals & Measurements  Vitals:    06/24/24 0959   BP: (!) 138/92   Pulse: 81   Temp: 98.2 °F (36.8 °C)     Assessment:  1. Secondary erythrocytosis  D75.1 #Severe erythrocytosis, hemoglobin 24, diagnosed in this generally healthy middle-aged gentleman, about 17-pack-year smoker but with no history of cardiopulmonary problems or dyspnea. Erythropoietin level is not suppressed; this is a point against polycythemia vera. Bone marrow examination does not suggest polycythemia vera. Calretinin mutation negative in bone marrow. JANESSA-2 mutation negative (both JANESSA-2 V617F, as well as exons 12-15 are negative)  -->  Polycythemia vera ruled out   (erythropoietin level not suppressed; JANESSA mutation negative)  Patient has some kind of secondary erythrocytosis   -Phlebotomized: 11/20/2017; 12/11/2017; 01/02/2018; 03/12/2019 (H/H 18.6/51.4); 07/15/2019 (H/H 18.0/52.2); 10/14/2019 (H/H 18.1/53.6)  -H/H: 16.2/48.8 (12/09/2019); 16.2/48.8 (11/11/2019; 16.0/48.1 (10/21/2019); 18.1/53.6 (10/08/2019), etc.  -After last therapeutic phlebotomy on 10/14/2019 (H/H 18.1/23.6), his H/H has remained stable, 16.6/49.9 as of 02/10/2020  -Therapeutic phlebotomy 605 cc on 04/21/2020 (H/H 17.7/52.2)  -07/06/2020: H/H 17.4/51.6  -Therapeutic phlebotomy 07/09/2020 (H/H 17.4/51.6), 07/20/2020 (H/H 15.3/45.3), 07/27/2020 (H/H 14.8/45.2)  -08/03/2020: H/H 14.0/42.2 (phlebotomy held)  -08/11/2020: H/H 13.9/41.4 (phlebotomy held)  -09/14/2020: CBC reviewed. H/H 14.6/44.1.  -Since 09/15/2020: Therapeutic phlebotomy: 12/14/2020, 04/19/2021, 08/16/2021, 11/22/2021, 03/21/2022, 04/18/2022        -11/28/2022:  H/H 13.9/45.0   -S/P therapeutic phlebotomy 11/28/2022, per patient preference (534 mL phlebotomized)  -01/03/2023:  H/H 13.1/43.7  -01/30/2023:  H/H 13.0/41.5  -04/24/2023:  Hemoglobin 13.7.  Hematocrit 42.7.  -07/31/2023:  Hemoglobin 15.1.  Hematocrit 47.6; phlebotomized 505 cc  -09/11/2023:  Hemoglobin 13.8.  Hematocrit 45.1.  -11/20/2023:  Hemoglobin 14.9.  Hematocrit 46.3  -therapeutic phlebotomy performed 11/20/2023 (520 cc blood)  -01/22/2024:  CBC reviewed; hemoglobin 13.7, hematocrit 41.9    -11/20/2023:  H/H 14.9/46.3  -03/25/2024:  WBC 4.26, hemoglobin 14.2, hematocrit 41.8, platelets 241 K; CMP reviewed, AST 67  6/3/2024: H/H 16.2,47.6 therapeutic phlebotomy     Plan:  Secondary erythrocytosis  -09/15/2020: No indication of phlebotomy at this time  -Recheck CBC in 1 month to assess the need of therapeutic phlebotomy, then follow-up visit    -No evidence of polycythemia vera  -Has secondary polycythemia  -No symptoms of hyperviscosity state  -However, has behaved more or less like polycythemia vera (persistently elevated H/H), requiring periodic therapeutic phlebotomy treatments    -Therapeutic phlebotomy as needed  -Target hematocrit <45  -Hold phlebotomy if hematocrit <45  -Continue baby aspirin 81 mg p.o. daily    Hct 45.0; therefore phlebotomy today (500cc removal)  Therapeutic phlebotomy to keep Hct <45%.  fu w/np in 1 month with lab work + infusion for possible phlebotomy  Continue ASA 81mg PO daily.      Elevated liver enzymes  06/24/2024:  ALT 84, AST 77  Limit alcohol intake  Follow up with PCP    -He has quit smoking    Discussion:  He has secondary erythrocytosis of unclear etiology.   No evidence of polycythemia vera (normal erythropoietin level and negative JANESSA-2 mutation rule out polycythemia vera).  However, in terms of persistence of elevated H/H, has behaved more or less like polycythemia vera.    Had no symptoms of hyperviscosity state at any time despite severe elevation of hemoglobin.    Regardless, to be on the safer side, will continue therapeutic phlebotomy as needed.

## 2024-07-22 ENCOUNTER — TELEPHONE (OUTPATIENT)
Dept: SMOKING CESSATION | Facility: CLINIC | Age: 44
End: 2024-07-22
Payer: COMMERCIAL

## 2024-07-22 NOTE — TELEPHONE ENCOUNTER
Returned pt's call.  Pt requested to reschedule his [facility] appointment.  Pt rescheduled to August 19, 2024 at 10 am.

## 2024-07-26 ENCOUNTER — TELEPHONE (OUTPATIENT)
Dept: HEMATOLOGY/ONCOLOGY | Facility: CLINIC | Age: 44
End: 2024-07-26
Payer: COMMERCIAL

## 2024-07-29 ENCOUNTER — APPOINTMENT (OUTPATIENT)
Dept: HEMATOLOGY/ONCOLOGY | Facility: CLINIC | Age: 44
End: 2024-07-29
Payer: COMMERCIAL

## 2024-07-29 ENCOUNTER — INFUSION (OUTPATIENT)
Dept: INFUSION THERAPY | Facility: HOSPITAL | Age: 44
End: 2024-07-29
Attending: INTERNAL MEDICINE
Payer: COMMERCIAL

## 2024-07-29 VITALS
WEIGHT: 163.81 LBS | BODY MASS INDEX: 27.96 KG/M2 | DIASTOLIC BLOOD PRESSURE: 87 MMHG | SYSTOLIC BLOOD PRESSURE: 140 MMHG | TEMPERATURE: 98 F | HEART RATE: 86 BPM | OXYGEN SATURATION: 99 % | RESPIRATION RATE: 20 BRPM | HEIGHT: 64 IN

## 2024-07-29 DIAGNOSIS — D75.1 SECONDARY ERYTHROCYTOSIS: ICD-10-CM

## 2024-07-29 DIAGNOSIS — F41.1 GAD (GENERALIZED ANXIETY DISORDER): ICD-10-CM

## 2024-07-29 LAB
ALBUMIN SERPL-MCNC: 3.8 G/DL (ref 3.5–5)
ALBUMIN/GLOB SERPL: 1 RATIO (ref 1.1–2)
ALP SERPL-CCNC: 112 UNIT/L (ref 40–150)
ALT SERPL-CCNC: 43 UNIT/L (ref 0–55)
ANION GAP SERPL CALC-SCNC: 10 MEQ/L
AST SERPL-CCNC: 46 UNIT/L (ref 5–34)
BASOPHILS # BLD AUTO: 0.06 X10(3)/MCL
BASOPHILS NFR BLD AUTO: 0.7 %
BILIRUB SERPL-MCNC: 0.3 MG/DL
BUN SERPL-MCNC: 5.6 MG/DL (ref 8.9–20.6)
CALCIUM SERPL-MCNC: 9.7 MG/DL (ref 8.4–10.2)
CHLORIDE SERPL-SCNC: 109 MMOL/L (ref 98–107)
CO2 SERPL-SCNC: 19 MMOL/L (ref 22–29)
CREAT SERPL-MCNC: 0.84 MG/DL (ref 0.73–1.18)
CREAT/UREA NIT SERPL: 7
EOSINOPHIL # BLD AUTO: 0.01 X10(3)/MCL (ref 0–0.9)
EOSINOPHIL NFR BLD AUTO: 0.1 %
ERYTHROCYTE [DISTWIDTH] IN BLOOD BY AUTOMATED COUNT: 14.3 % (ref 11.5–17)
GFR SERPLBLD CREATININE-BSD FMLA CKD-EPI: >60 ML/MIN/1.73/M2
GLOBULIN SER-MCNC: 3.9 GM/DL (ref 2.4–3.5)
GLUCOSE SERPL-MCNC: 96 MG/DL (ref 74–100)
HCT VFR BLD AUTO: 43.5 % (ref 42–52)
HGB BLD-MCNC: 13.7 G/DL (ref 14–18)
IMM GRANULOCYTES # BLD AUTO: 0.04 X10(3)/MCL (ref 0–0.04)
IMM GRANULOCYTES NFR BLD AUTO: 0.5 %
LYMPHOCYTES # BLD AUTO: 1.27 X10(3)/MCL (ref 0.6–4.6)
LYMPHOCYTES NFR BLD AUTO: 15.7 %
MCH RBC QN AUTO: 26.6 PG (ref 27–31)
MCHC RBC AUTO-ENTMCNC: 31.5 G/DL (ref 33–36)
MCV RBC AUTO: 84.3 FL (ref 80–94)
MONOCYTES # BLD AUTO: 0.64 X10(3)/MCL (ref 0.1–1.3)
MONOCYTES NFR BLD AUTO: 7.9 %
NEUTROPHILS # BLD AUTO: 6.05 X10(3)/MCL (ref 2.1–9.2)
NEUTROPHILS NFR BLD AUTO: 75.1 %
NRBC BLD AUTO-RTO: 0 %
PLATELET # BLD AUTO: 254 X10(3)/MCL (ref 130–400)
PLATELETS.RETICULATED NFR BLD AUTO: 3.6 % (ref 0.9–11.2)
PMV BLD AUTO: 10.1 FL (ref 7.4–10.4)
POTASSIUM SERPL-SCNC: 4.2 MMOL/L (ref 3.5–5.1)
PROT SERPL-MCNC: 7.7 GM/DL (ref 6.4–8.3)
RBC # BLD AUTO: 5.16 X10(6)/MCL (ref 4.7–6.1)
SODIUM SERPL-SCNC: 138 MMOL/L (ref 136–145)
WBC # BLD AUTO: 8.07 X10(3)/MCL (ref 4.5–11.5)

## 2024-07-29 PROCEDURE — 36415 COLL VENOUS BLD VENIPUNCTURE: CPT

## 2024-07-29 PROCEDURE — 85025 COMPLETE CBC W/AUTO DIFF WBC: CPT

## 2024-07-29 PROCEDURE — 80053 COMPREHEN METABOLIC PANEL: CPT

## 2024-07-29 NOTE — NURSING
1020 Scheduled for tx 5 Phlebotomy Hct level 43.5% less than 45% did not require treatment today.

## 2024-08-19 ENCOUNTER — TELEPHONE (OUTPATIENT)
Dept: SMOKING CESSATION | Facility: CLINIC | Age: 44
End: 2024-08-19
Payer: COMMERCIAL

## 2024-08-19 NOTE — TELEPHONE ENCOUNTER
Pt called stating that he needed directions.  Pt then stated that he has not vaped in 2 months and has not smoked a cigarette in 7 years.  Congratulated pt on his quit.  Pt stated that he does not feel like he needs our services at this time.  Pt was given my contact information to call if he needs any additional support.

## 2024-08-23 ENCOUNTER — TELEPHONE (OUTPATIENT)
Dept: HEMATOLOGY/ONCOLOGY | Facility: CLINIC | Age: 44
End: 2024-08-23
Payer: COMMERCIAL

## 2024-08-26 ENCOUNTER — INFUSION (OUTPATIENT)
Dept: INFUSION THERAPY | Facility: HOSPITAL | Age: 44
End: 2024-08-26
Attending: INTERNAL MEDICINE
Payer: COMMERCIAL

## 2024-08-26 ENCOUNTER — OFFICE VISIT (OUTPATIENT)
Dept: HEMATOLOGY/ONCOLOGY | Facility: CLINIC | Age: 44
End: 2024-08-26
Payer: COMMERCIAL

## 2024-08-26 ENCOUNTER — APPOINTMENT (OUTPATIENT)
Dept: HEMATOLOGY/ONCOLOGY | Facility: CLINIC | Age: 44
End: 2024-08-26
Payer: COMMERCIAL

## 2024-08-26 VITALS
WEIGHT: 160.19 LBS | HEART RATE: 87 BPM | HEIGHT: 64 IN | TEMPERATURE: 98 F | SYSTOLIC BLOOD PRESSURE: 126 MMHG | OXYGEN SATURATION: 100 % | RESPIRATION RATE: 20 BRPM | BODY MASS INDEX: 27.35 KG/M2 | DIASTOLIC BLOOD PRESSURE: 79 MMHG

## 2024-08-26 DIAGNOSIS — F41.1 GAD (GENERALIZED ANXIETY DISORDER): ICD-10-CM

## 2024-08-26 DIAGNOSIS — D75.1 SECONDARY ERYTHROCYTOSIS: ICD-10-CM

## 2024-08-26 DIAGNOSIS — D75.1 SECONDARY ERYTHROCYTOSIS: Primary | ICD-10-CM

## 2024-08-26 PROBLEM — Z00.00 ANNUAL PHYSICAL EXAM: Status: RESOLVED | Noted: 2024-05-27 | Resolved: 2024-08-26

## 2024-08-26 LAB
ALBUMIN SERPL-MCNC: 3.7 G/DL (ref 3.5–5)
ALBUMIN/GLOB SERPL: 1 RATIO (ref 1.1–2)
ALP SERPL-CCNC: 94 UNIT/L (ref 40–150)
ALT SERPL-CCNC: 38 UNIT/L (ref 0–55)
ANION GAP SERPL CALC-SCNC: 11 MEQ/L
AST SERPL-CCNC: 37 UNIT/L (ref 5–34)
BASOPHILS # BLD AUTO: 0.05 X10(3)/MCL
BASOPHILS NFR BLD AUTO: 0.8 %
BILIRUB SERPL-MCNC: 0.2 MG/DL
BUN SERPL-MCNC: 7.8 MG/DL (ref 8.9–20.6)
CALCIUM SERPL-MCNC: 8.9 MG/DL (ref 8.4–10.2)
CHLORIDE SERPL-SCNC: 110 MMOL/L (ref 98–107)
CO2 SERPL-SCNC: 22 MMOL/L (ref 22–29)
CREAT SERPL-MCNC: 0.8 MG/DL (ref 0.73–1.18)
CREAT/UREA NIT SERPL: 10
EOSINOPHIL # BLD AUTO: 0.01 X10(3)/MCL (ref 0–0.9)
EOSINOPHIL NFR BLD AUTO: 0.2 %
ERYTHROCYTE [DISTWIDTH] IN BLOOD BY AUTOMATED COUNT: 15.8 % (ref 11.5–17)
GFR SERPLBLD CREATININE-BSD FMLA CKD-EPI: >60 ML/MIN/1.73/M2
GLOBULIN SER-MCNC: 3.6 GM/DL (ref 2.4–3.5)
GLUCOSE SERPL-MCNC: 87 MG/DL (ref 74–100)
HCT VFR BLD AUTO: 41 % (ref 42–52)
HGB BLD-MCNC: 12.9 G/DL (ref 14–18)
IMM GRANULOCYTES # BLD AUTO: 0.01 X10(3)/MCL (ref 0–0.04)
IMM GRANULOCYTES NFR BLD AUTO: 0.2 %
LYMPHOCYTES # BLD AUTO: 1.6 X10(3)/MCL (ref 0.6–4.6)
LYMPHOCYTES NFR BLD AUTO: 26.4 %
MCH RBC QN AUTO: 25.6 PG (ref 27–31)
MCHC RBC AUTO-ENTMCNC: 31.5 G/DL (ref 33–36)
MCV RBC AUTO: 81.5 FL (ref 80–94)
MONOCYTES # BLD AUTO: 0.46 X10(3)/MCL (ref 0.1–1.3)
MONOCYTES NFR BLD AUTO: 7.6 %
NEUTROPHILS # BLD AUTO: 3.93 X10(3)/MCL (ref 2.1–9.2)
NEUTROPHILS NFR BLD AUTO: 64.8 %
NRBC BLD AUTO-RTO: 0 %
PLATELET # BLD AUTO: 276 X10(3)/MCL (ref 130–400)
PMV BLD AUTO: 8.8 FL (ref 7.4–10.4)
POTASSIUM SERPL-SCNC: 4 MMOL/L (ref 3.5–5.1)
PROT SERPL-MCNC: 7.3 GM/DL (ref 6.4–8.3)
RBC # BLD AUTO: 5.03 X10(6)/MCL (ref 4.7–6.1)
SODIUM SERPL-SCNC: 143 MMOL/L (ref 136–145)
WBC # BLD AUTO: 6.06 X10(3)/MCL (ref 4.5–11.5)

## 2024-08-26 PROCEDURE — 36415 COLL VENOUS BLD VENIPUNCTURE: CPT

## 2024-08-26 PROCEDURE — 85025 COMPLETE CBC W/AUTO DIFF WBC: CPT

## 2024-08-26 PROCEDURE — 3078F DIAST BP <80 MM HG: CPT | Mod: CPTII,,, | Performed by: NURSE PRACTITIONER

## 2024-08-26 PROCEDURE — 3008F BODY MASS INDEX DOCD: CPT | Mod: CPTII,,, | Performed by: NURSE PRACTITIONER

## 2024-08-26 PROCEDURE — 99214 OFFICE O/P EST MOD 30 MIN: CPT | Mod: S$PBB,,, | Performed by: NURSE PRACTITIONER

## 2024-08-26 PROCEDURE — 1160F RVW MEDS BY RX/DR IN RCRD: CPT | Mod: CPTII,,, | Performed by: NURSE PRACTITIONER

## 2024-08-26 PROCEDURE — 80053 COMPREHEN METABOLIC PANEL: CPT

## 2024-08-26 PROCEDURE — 4010F ACE/ARB THERAPY RXD/TAKEN: CPT | Mod: CPTII,,, | Performed by: NURSE PRACTITIONER

## 2024-08-26 PROCEDURE — 99214 OFFICE O/P EST MOD 30 MIN: CPT | Mod: PBBFAC | Performed by: NURSE PRACTITIONER

## 2024-08-26 PROCEDURE — 3074F SYST BP LT 130 MM HG: CPT | Mod: CPTII,,, | Performed by: NURSE PRACTITIONER

## 2024-08-26 PROCEDURE — 1159F MED LIST DOCD IN RCRD: CPT | Mod: CPTII,,, | Performed by: NURSE PRACTITIONER

## 2024-08-26 NOTE — PROGRESS NOTES
Problem List:  Severe erythrocytosis    Current Treatment:  ASA 81mg po daily.  Therapeutic phlebotomy to keep Hct <45%    History of Present Illness:  The patient is a pleasant 36-year-old -American man. For a full, detailed history, please see the note dated 8/9/2021.    Interval History 8/26/2024: Patient presents alone for ongoing management of erythrocytosis.  Today lab work reviewed with the patient hematocrit 41.0 goal is <45%.  Patient denies any fatigue, itching, headache or chest pain. We discussed plan of care and follow-up.      Review of Systems   All other systems reviewed and are negative.    Physical Exam  Constitutional:       Appearance: Normal appearance.   HENT:      Head: Normocephalic.      Mouth/Throat:      Mouth: Mucous membranes are moist.   Eyes:      Pupils: Pupils are equal, round, and reactive to light.   Cardiovascular:      Rate and Rhythm: Normal rate and regular rhythm.      Pulses: Normal pulses.      Heart sounds: Normal heart sounds.   Pulmonary:      Effort: Pulmonary effort is normal.      Breath sounds: Normal breath sounds.   Abdominal:      General: Bowel sounds are normal.      Palpations: Abdomen is soft.   Musculoskeletal:         General: Normal range of motion.      Cervical back: Normal range of motion.   Skin:     General: Skin is warm and dry.      Capillary Refill: Capillary refill takes less than 2 seconds.   Neurological:      General: No focal deficit present.      Mental Status: He is alert and oriented to person, place, and time.   Psychiatric:         Attention and Perception: Attention normal.         Mood and Affect: Affect normal.         Speech: Speech normal.         Behavior: Behavior normal.         Thought Content: Thought content normal.         Lab Results   Component Value Date    WBC 6.06 08/26/2024    HGB 12.9 (L) 08/26/2024    HCT 41.0 (L) 08/26/2024    MCV 81.5 08/26/2024     08/26/2024       CMP  Sodium   Date Value Ref Range  Status   08/26/2024 143 136 - 145 mmol/L Final     Potassium   Date Value Ref Range Status   08/26/2024 4.0 3.5 - 5.1 mmol/L Final     Chloride   Date Value Ref Range Status   08/26/2024 110 (H) 98 - 107 mmol/L Final     CO2   Date Value Ref Range Status   08/26/2024 22 22 - 29 mmol/L Final     Blood Urea Nitrogen   Date Value Ref Range Status   08/26/2024 7.8 (L) 8.9 - 20.6 mg/dL Final     Creatinine   Date Value Ref Range Status   08/26/2024 0.80 0.73 - 1.18 mg/dL Final     Calcium   Date Value Ref Range Status   08/26/2024 8.9 8.4 - 10.2 mg/dL Final     Albumin   Date Value Ref Range Status   08/26/2024 3.7 3.5 - 5.0 g/dL Final     Bilirubin Total   Date Value Ref Range Status   08/26/2024 0.2 <=1.5 mg/dL Final     ALP   Date Value Ref Range Status   08/26/2024 94 40 - 150 unit/L Final     AST   Date Value Ref Range Status   08/26/2024 37 (H) 5 - 34 unit/L Final     ALT   Date Value Ref Range Status   08/26/2024 38 0 - 55 unit/L Final     Estimated GFR-Non    Date Value Ref Range Status   04/18/2022 96 >=90        Vitals & Measurements  Vitals:    08/26/24 1303   BP: 126/79   Pulse: 87   Resp: 20   Temp: 98.2 °F (36.8 °C)     Assessment:  1. 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 Erythropoietin level is not suppressed; this is a point against polycythemia vera. Bone marrow examination does not suggest polycythemia vera. Calretinin mutation negative in bone marrow. JANESSA-2 mutation negative (both JANESSA-2 V617F, as well as exons 12-15 are negative)  -->  Polycythemia vera ruled out   (erythropoietin level not suppressed; JANESSA mutation negative)  Patient has some kind of secondary erythrocytosis   -Phlebotomized: 11/20/2017; 12/11/2017; 01/02/2018; 03/12/2019 (H/H 18.6/51.4); 07/15/2019 (H/H 18.0/52.2); 10/14/2019 (H/H  18.1/53.6)  -H/H: 16.2/48.8 (12/09/2019); 16.2/48.8 (11/11/2019; 16.0/48.1 (10/21/2019); 18.1/53.6 (10/08/2019), etc.  -After last therapeutic phlebotomy on 10/14/2019 (H/H 18.1/23.6), his H/H has remained stable, 16.6/49.9 as of 02/10/2020  -Therapeutic phlebotomy 605 cc on 04/21/2020 (H/H 17.7/52.2)  -07/06/2020: H/H 17.4/51.6  -Therapeutic phlebotomy 07/09/2020 (H/H 17.4/51.6), 07/20/2020 (H/H 15.3/45.3), 07/27/2020 (H/H 14.8/45.2)  -08/03/2020: H/H 14.0/42.2 (phlebotomy held)  -08/11/2020: H/H 13.9/41.4 (phlebotomy held)  -09/14/2020: CBC reviewed. H/H 14.6/44.1.  -Since 09/15/2020: Therapeutic phlebotomy: 12/14/2020, 04/19/2021, 08/16/2021, 11/22/2021, 03/21/2022, 04/18/2022        -11/28/2022:  H/H 13.9/45.0   -S/P therapeutic phlebotomy 11/28/2022, per patient preference (534 mL phlebotomized)  -01/03/2023:  H/H 13.1/43.7  -01/30/2023:  H/H 13.0/41.5  -04/24/2023:  Hemoglobin 13.7.  Hematocrit 42.7.  -07/31/2023:  Hemoglobin 15.1.  Hematocrit 47.6; phlebotomized 505 cc  -09/11/2023:  Hemoglobin 13.8.  Hematocrit 45.1.  -11/20/2023:  Hemoglobin 14.9.  Hematocrit 46.3  -therapeutic phlebotomy performed 11/20/2023 (520 cc blood)  -01/22/2024:  CBC reviewed; hemoglobin 13.7, hematocrit 41.9   -11/20/2023:  H/H 14.9/46.3  -03/25/2024:  WBC 4.26, hemoglobin 14.2, hematocrit 41.8, platelets 241 K; CMP reviewed, AST 67  6/3/2024: H/H 16.2,47.6 therapeutic phlebotomy     Plan:  Secondary erythrocytosis  -09/15/2020: No indication of phlebotomy at this time  -Recheck CBC in 1 month to assess the need of therapeutic phlebotomy, then follow-up visit    -No evidence of polycythemia vera  -Has secondary polycythemia  -No symptoms of hyperviscosity state  -However, has behaved more or less like polycythemia vera (persistently elevated H/H), requiring periodic therapeutic phlebotomy treatments    -Therapeutic phlebotomy as needed  -Target hematocrit <45  -Hold phlebotomy if hematocrit <45  -Continue baby aspirin 81 mg p.o.  daily    Hct 41.0; therefore no phlebotomy   Therapeutic phlebotomy to keep Hct <45%.  fu w/np in 2 month with lab work + infusion for possible phlebotomy  Continue ASA 81mg PO daily.    -He has quit smoking    Discussion:  He has secondary erythrocytosis of unclear etiology.   No evidence of polycythemia vera (normal erythropoietin level and negative JANESSA-2 mutation rule out polycythemia vera).  However, in terms of persistence of elevated H/H, has behaved more or less like polycythemia vera.    Had no symptoms of hyperviscosity state at any time despite severe elevation of hemoglobin.    Regardless, to be on the safer side, will continue therapeutic phlebotomy as needed.

## 2024-08-26 NOTE — NURSING
1400 Scheduled for tx 5 Phlebotomy not required today's Hct level 41.0% has a return appointment scheduled for 10/28/24.

## 2024-10-25 ENCOUNTER — TELEPHONE (OUTPATIENT)
Dept: HEMATOLOGY/ONCOLOGY | Facility: CLINIC | Age: 44
End: 2024-10-25
Payer: COMMERCIAL

## 2024-10-28 ENCOUNTER — APPOINTMENT (OUTPATIENT)
Dept: HEMATOLOGY/ONCOLOGY | Facility: CLINIC | Age: 44
End: 2024-10-28
Payer: COMMERCIAL

## 2024-10-28 ENCOUNTER — OFFICE VISIT (OUTPATIENT)
Dept: HEMATOLOGY/ONCOLOGY | Facility: CLINIC | Age: 44
End: 2024-10-28
Payer: COMMERCIAL

## 2024-10-28 ENCOUNTER — INFUSION (OUTPATIENT)
Dept: INFUSION THERAPY | Facility: HOSPITAL | Age: 44
End: 2024-10-28
Attending: INTERNAL MEDICINE
Payer: COMMERCIAL

## 2024-10-28 VITALS
HEART RATE: 98 BPM | SYSTOLIC BLOOD PRESSURE: 142 MMHG | TEMPERATURE: 98 F | BODY MASS INDEX: 26.99 KG/M2 | DIASTOLIC BLOOD PRESSURE: 88 MMHG | WEIGHT: 162 LBS | OXYGEN SATURATION: 100 % | HEIGHT: 65 IN | RESPIRATION RATE: 20 BRPM

## 2024-10-28 DIAGNOSIS — D75.1 SECONDARY ERYTHROCYTOSIS: Primary | ICD-10-CM

## 2024-10-28 DIAGNOSIS — R74.8 ELEVATED LIVER ENZYMES: ICD-10-CM

## 2024-10-28 DIAGNOSIS — D75.1 SECONDARY ERYTHROCYTOSIS: ICD-10-CM

## 2024-10-28 DIAGNOSIS — F41.1 GAD (GENERALIZED ANXIETY DISORDER): ICD-10-CM

## 2024-10-28 LAB
ALBUMIN SERPL-MCNC: 3.5 G/DL (ref 3.5–5)
ALBUMIN/GLOB SERPL: 0.9 RATIO (ref 1.1–2)
ALP SERPL-CCNC: 109 UNIT/L (ref 40–150)
ALT SERPL-CCNC: 70 UNIT/L (ref 0–55)
ANION GAP SERPL CALC-SCNC: 10 MEQ/L
AST SERPL-CCNC: 85 UNIT/L (ref 5–34)
BASOPHILS # BLD AUTO: 0.04 X10(3)/MCL
BASOPHILS NFR BLD AUTO: 0.9 %
BILIRUB SERPL-MCNC: 0.3 MG/DL
BUN SERPL-MCNC: 3.8 MG/DL (ref 8.9–20.6)
CALCIUM SERPL-MCNC: 8.9 MG/DL (ref 8.4–10.2)
CHLORIDE SERPL-SCNC: 108 MMOL/L (ref 98–107)
CO2 SERPL-SCNC: 23 MMOL/L (ref 22–29)
CREAT SERPL-MCNC: 0.86 MG/DL (ref 0.72–1.25)
CREAT/UREA NIT SERPL: 4
EOSINOPHIL # BLD AUTO: 0.03 X10(3)/MCL (ref 0–0.9)
EOSINOPHIL NFR BLD AUTO: 0.7 %
ERYTHROCYTE [DISTWIDTH] IN BLOOD BY AUTOMATED COUNT: 18.2 % (ref 11.5–17)
GFR SERPLBLD CREATININE-BSD FMLA CKD-EPI: >60 ML/MIN/1.73/M2
GLOBULIN SER-MCNC: 3.9 GM/DL (ref 2.4–3.5)
GLUCOSE SERPL-MCNC: 147 MG/DL (ref 74–100)
HCT VFR BLD AUTO: 44.2 % (ref 42–52)
HGB BLD-MCNC: 14.3 G/DL (ref 14–18)
IMM GRANULOCYTES # BLD AUTO: 0.01 X10(3)/MCL (ref 0–0.04)
IMM GRANULOCYTES NFR BLD AUTO: 0.2 %
LYMPHOCYTES # BLD AUTO: 0.97 X10(3)/MCL (ref 0.6–4.6)
LYMPHOCYTES NFR BLD AUTO: 21.8 %
MCH RBC QN AUTO: 25.9 PG (ref 27–31)
MCHC RBC AUTO-ENTMCNC: 32.4 G/DL (ref 33–36)
MCV RBC AUTO: 79.9 FL (ref 80–94)
MONOCYTES # BLD AUTO: 0.36 X10(3)/MCL (ref 0.1–1.3)
MONOCYTES NFR BLD AUTO: 8.1 %
NEUTROPHILS # BLD AUTO: 3.04 X10(3)/MCL (ref 2.1–9.2)
NEUTROPHILS NFR BLD AUTO: 68.3 %
NRBC BLD AUTO-RTO: 0 %
PLATELET # BLD AUTO: 231 X10(3)/MCL (ref 130–400)
PMV BLD AUTO: 9.8 FL (ref 7.4–10.4)
POTASSIUM SERPL-SCNC: 3.5 MMOL/L (ref 3.5–5.1)
PROT SERPL-MCNC: 7.4 GM/DL (ref 6.4–8.3)
RBC # BLD AUTO: 5.53 X10(6)/MCL (ref 4.7–6.1)
SODIUM SERPL-SCNC: 141 MMOL/L (ref 136–145)
WBC # BLD AUTO: 4.45 X10(3)/MCL (ref 4.5–11.5)

## 2024-10-28 PROCEDURE — 99214 OFFICE O/P EST MOD 30 MIN: CPT | Mod: S$PBB,,, | Performed by: NURSE PRACTITIONER

## 2024-10-28 PROCEDURE — 99214 OFFICE O/P EST MOD 30 MIN: CPT | Mod: PBBFAC | Performed by: NURSE PRACTITIONER

## 2024-10-28 PROCEDURE — 3079F DIAST BP 80-89 MM HG: CPT | Mod: CPTII,,, | Performed by: NURSE PRACTITIONER

## 2024-10-28 PROCEDURE — 1159F MED LIST DOCD IN RCRD: CPT | Mod: CPTII,,, | Performed by: NURSE PRACTITIONER

## 2024-10-28 PROCEDURE — 85025 COMPLETE CBC W/AUTO DIFF WBC: CPT

## 2024-10-28 PROCEDURE — 3008F BODY MASS INDEX DOCD: CPT | Mod: CPTII,,, | Performed by: NURSE PRACTITIONER

## 2024-10-28 PROCEDURE — 4010F ACE/ARB THERAPY RXD/TAKEN: CPT | Mod: CPTII,,, | Performed by: NURSE PRACTITIONER

## 2024-10-28 PROCEDURE — 80053 COMPREHEN METABOLIC PANEL: CPT

## 2024-10-28 PROCEDURE — 3077F SYST BP >= 140 MM HG: CPT | Mod: CPTII,,, | Performed by: NURSE PRACTITIONER

## 2024-10-28 PROCEDURE — 1160F RVW MEDS BY RX/DR IN RCRD: CPT | Mod: CPTII,,, | Performed by: NURSE PRACTITIONER

## 2024-10-28 PROCEDURE — 36415 COLL VENOUS BLD VENIPUNCTURE: CPT

## 2024-12-04 DIAGNOSIS — D75.1 SECONDARY ERYTHROCYTOSIS: Primary | ICD-10-CM

## 2024-12-06 ENCOUNTER — TELEPHONE (OUTPATIENT)
Dept: HEMATOLOGY/ONCOLOGY | Facility: CLINIC | Age: 44
End: 2024-12-06
Payer: COMMERCIAL

## 2024-12-09 ENCOUNTER — DOCUMENTATION ONLY (OUTPATIENT)
Dept: INFUSION THERAPY | Facility: HOSPITAL | Age: 44
End: 2024-12-09
Payer: COMMERCIAL

## 2024-12-09 ENCOUNTER — OFFICE VISIT (OUTPATIENT)
Dept: HEMATOLOGY/ONCOLOGY | Facility: CLINIC | Age: 44
End: 2024-12-09
Payer: COMMERCIAL

## 2024-12-09 ENCOUNTER — APPOINTMENT (OUTPATIENT)
Dept: HEMATOLOGY/ONCOLOGY | Facility: CLINIC | Age: 44
End: 2024-12-09
Payer: COMMERCIAL

## 2024-12-09 VITALS
TEMPERATURE: 98 F | SYSTOLIC BLOOD PRESSURE: 117 MMHG | WEIGHT: 165.19 LBS | BODY MASS INDEX: 27.52 KG/M2 | DIASTOLIC BLOOD PRESSURE: 69 MMHG | HEIGHT: 65 IN | RESPIRATION RATE: 20 BRPM | HEART RATE: 93 BPM | OXYGEN SATURATION: 100 %

## 2024-12-09 DIAGNOSIS — D75.1 SECONDARY ERYTHROCYTOSIS: ICD-10-CM

## 2024-12-09 DIAGNOSIS — R74.8 ELEVATED LIVER ENZYMES: ICD-10-CM

## 2024-12-09 DIAGNOSIS — D75.1 SECONDARY ERYTHROCYTOSIS: Primary | ICD-10-CM

## 2024-12-09 LAB
ALBUMIN SERPL-MCNC: 3.4 G/DL (ref 3.5–5)
ALBUMIN/GLOB SERPL: 0.9 RATIO (ref 1.1–2)
ALP SERPL-CCNC: 96 UNIT/L (ref 40–150)
ALT SERPL-CCNC: 76 UNIT/L (ref 0–55)
ANION GAP SERPL CALC-SCNC: 10 MEQ/L
AST SERPL-CCNC: 86 UNIT/L (ref 5–34)
BASOPHILS # BLD AUTO: 0.07 X10(3)/MCL
BASOPHILS NFR BLD AUTO: 1.7 %
BILIRUB SERPL-MCNC: 0.3 MG/DL
BUN SERPL-MCNC: 5.1 MG/DL (ref 8.9–20.6)
CALCIUM SERPL-MCNC: 8.7 MG/DL (ref 8.4–10.2)
CHLORIDE SERPL-SCNC: 111 MMOL/L (ref 98–107)
CO2 SERPL-SCNC: 21 MMOL/L (ref 22–29)
CREAT SERPL-MCNC: 0.84 MG/DL (ref 0.72–1.25)
CREAT/UREA NIT SERPL: 6
EOSINOPHIL # BLD AUTO: 0.03 X10(3)/MCL (ref 0–0.9)
EOSINOPHIL NFR BLD AUTO: 0.7 %
ERYTHROCYTE [DISTWIDTH] IN BLOOD BY AUTOMATED COUNT: 16.7 % (ref 11.5–17)
FERRITIN SERPL-MCNC: 10.42 NG/ML (ref 21.81–274.66)
GFR SERPLBLD CREATININE-BSD FMLA CKD-EPI: >60 ML/MIN/1.73/M2
GLOBULIN SER-MCNC: 3.7 GM/DL (ref 2.4–3.5)
GLUCOSE SERPL-MCNC: 102 MG/DL (ref 74–100)
HCT VFR BLD AUTO: 42 % (ref 42–52)
HGB BLD-MCNC: 13.8 G/DL (ref 14–18)
IMM GRANULOCYTES # BLD AUTO: 0 X10(3)/MCL (ref 0–0.04)
IMM GRANULOCYTES NFR BLD AUTO: 0 %
IRON SATN MFR SERPL: 5 % (ref 20–50)
IRON SERPL-MCNC: 19 UG/DL (ref 65–175)
LYMPHOCYTES # BLD AUTO: 1.14 X10(3)/MCL (ref 0.6–4.6)
LYMPHOCYTES NFR BLD AUTO: 27 %
MAGNESIUM SERPL-MCNC: 2.1 MG/DL (ref 1.6–2.6)
MCH RBC QN AUTO: 26.2 PG (ref 27–31)
MCHC RBC AUTO-ENTMCNC: 32.9 G/DL (ref 33–36)
MCV RBC AUTO: 79.7 FL (ref 80–94)
MONOCYTES # BLD AUTO: 0.46 X10(3)/MCL (ref 0.1–1.3)
MONOCYTES NFR BLD AUTO: 10.9 %
NEUTROPHILS # BLD AUTO: 2.53 X10(3)/MCL (ref 2.1–9.2)
NEUTROPHILS NFR BLD AUTO: 59.7 %
NRBC BLD AUTO-RTO: 0 %
PLATELET # BLD AUTO: 275 X10(3)/MCL (ref 130–400)
PMV BLD AUTO: 9.1 FL (ref 7.4–10.4)
POTASSIUM SERPL-SCNC: 3.7 MMOL/L (ref 3.5–5.1)
PROT SERPL-MCNC: 7.1 GM/DL (ref 6.4–8.3)
RBC # BLD AUTO: 5.27 X10(6)/MCL (ref 4.7–6.1)
SODIUM SERPL-SCNC: 142 MMOL/L (ref 136–145)
TIBC SERPL-MCNC: 385 UG/DL (ref 60–240)
TIBC SERPL-MCNC: 404 UG/DL (ref 250–450)
TRANSFERRIN SERPL-MCNC: 381 MG/DL (ref 174–364)
WBC # BLD AUTO: 4.23 X10(3)/MCL (ref 4.5–11.5)

## 2024-12-09 PROCEDURE — 99214 OFFICE O/P EST MOD 30 MIN: CPT | Mod: S$PBB,,, | Performed by: NURSE PRACTITIONER

## 2024-12-09 PROCEDURE — 3008F BODY MASS INDEX DOCD: CPT | Mod: CPTII,,, | Performed by: NURSE PRACTITIONER

## 2024-12-09 PROCEDURE — 4010F ACE/ARB THERAPY RXD/TAKEN: CPT | Mod: CPTII,,, | Performed by: NURSE PRACTITIONER

## 2024-12-09 PROCEDURE — 83550 IRON BINDING TEST: CPT | Performed by: NURSE PRACTITIONER

## 2024-12-09 PROCEDURE — 80053 COMPREHEN METABOLIC PANEL: CPT

## 2024-12-09 PROCEDURE — 83735 ASSAY OF MAGNESIUM: CPT

## 2024-12-09 PROCEDURE — 85025 COMPLETE CBC W/AUTO DIFF WBC: CPT

## 2024-12-09 PROCEDURE — 99213 OFFICE O/P EST LOW 20 MIN: CPT | Mod: PBBFAC | Performed by: NURSE PRACTITIONER

## 2024-12-09 PROCEDURE — 3074F SYST BP LT 130 MM HG: CPT | Mod: CPTII,,, | Performed by: NURSE PRACTITIONER

## 2024-12-09 PROCEDURE — 36415 COLL VENOUS BLD VENIPUNCTURE: CPT

## 2024-12-09 PROCEDURE — 82728 ASSAY OF FERRITIN: CPT | Performed by: NURSE PRACTITIONER

## 2024-12-09 PROCEDURE — 1160F RVW MEDS BY RX/DR IN RCRD: CPT | Mod: CPTII,,, | Performed by: NURSE PRACTITIONER

## 2024-12-09 PROCEDURE — 1159F MED LIST DOCD IN RCRD: CPT | Mod: CPTII,,, | Performed by: NURSE PRACTITIONER

## 2024-12-09 PROCEDURE — 3078F DIAST BP <80 MM HG: CPT | Mod: CPTII,,, | Performed by: NURSE PRACTITIONER

## 2024-12-09 NOTE — Clinical Note
no Phlebotomy today fu w/np in 2 months with lab work possible infusion for phlebotomy (Monday appointment only)

## 2024-12-09 NOTE — PROGRESS NOTES
Problem List:  Severe erythrocytosis    Current Treatment:  ASA 81mg po daily.  Therapeutic phlebotomy to keep Hct <45%    History of Present Illness:  The patient is a pleasant 36-year-old -American man. For a full, detailed history, please see the note dated 8/9/2021.    Interval History 12/9/2024: Patient presents alone for ongoing management of erythrocytosis.He denies any significant fatigue, abnormal bleeding, unusual headaches, dizziness, pruritis. Lab work reviewed with patient, HCT within goal reange 42.0. Liver enzymes slightly elevated but stable.  Patient does report symptoms of heartburn and swollen gums.  We discussed plan of care and follow up.     Review of Systems   Eyes:  Positive for redness.   All other systems reviewed and are negative.    Physical Exam  Constitutional:       Appearance: Normal appearance.   HENT:      Head: Normocephalic.      Mouth/Throat:      Mouth: Mucous membranes are moist.   Eyes:      Pupils: Pupils are equal, round, and reactive to light.   Cardiovascular:      Rate and Rhythm: Normal rate and regular rhythm.      Pulses: Normal pulses.      Heart sounds: Normal heart sounds.   Pulmonary:      Effort: Pulmonary effort is normal.      Breath sounds: Normal breath sounds.   Abdominal:      General: Bowel sounds are normal.      Palpations: Abdomen is soft.   Musculoskeletal:         General: Normal range of motion.      Cervical back: Normal range of motion.   Skin:     General: Skin is warm and dry.      Capillary Refill: Capillary refill takes less than 2 seconds.   Neurological:      General: No focal deficit present.      Mental Status: He is alert and oriented to person, place, and time.   Psychiatric:         Attention and Perception: Attention normal.         Mood and Affect: Affect normal.         Speech: Speech normal.         Behavior: Behavior normal.         Thought Content: Thought content normal.         Lab Results   Component Value Date    WBC 4.23  (L) 12/09/2024    HGB 13.8 (L) 12/09/2024    HCT 42.0 12/09/2024    MCV 79.7 (L) 12/09/2024     12/09/2024       CMP  Sodium   Date Value Ref Range Status   12/09/2024 142 136 - 145 mmol/L Final     Potassium   Date Value Ref Range Status   12/09/2024 3.7 3.5 - 5.1 mmol/L Final     Chloride   Date Value Ref Range Status   12/09/2024 111 (H) 98 - 107 mmol/L Final     CO2   Date Value Ref Range Status   12/09/2024 21 (L) 22 - 29 mmol/L Final     Blood Urea Nitrogen   Date Value Ref Range Status   12/09/2024 5.1 (L) 8.9 - 20.6 mg/dL Final     Creatinine   Date Value Ref Range Status   12/09/2024 0.84 0.72 - 1.25 mg/dL Final     Calcium   Date Value Ref Range Status   12/09/2024 8.7 8.4 - 10.2 mg/dL Final     Albumin   Date Value Ref Range Status   12/09/2024 3.4 (L) 3.5 - 5.0 g/dL Final     Bilirubin Total   Date Value Ref Range Status   12/09/2024 0.3 <=1.5 mg/dL Final     ALP   Date Value Ref Range Status   12/09/2024 96 40 - 150 unit/L Final     AST   Date Value Ref Range Status   12/09/2024 86 (H) 5 - 34 unit/L Final     ALT   Date Value Ref Range Status   12/09/2024 76 (H) 0 - 55 unit/L Final     Estimated GFR-Non    Date Value Ref Range Status   04/18/2022 96 >=90        Vitals & Measurements  Vitals:    12/09/24 0922   BP: 117/69   Pulse: 93   Resp: 20   Temp: 97.9 °F (36.6 °C)     Assessment:  1. 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 Erythropoietin level is not suppressed; this is a point against polycythemia vera. Bone marrow examination does not suggest polycythemia vera. Calretinin mutation negative in bone marrow. JANESSA-2 mutation negative (both JANESSA-2 V617F, as well as exons 12-15 are negative)  -->  Polycythemia vera ruled out   (erythropoietin level not suppressed; JANESSA mutation negative)  Patient has some kind  of secondary erythrocytosis   -Phlebotomized: 11/20/2017; 12/11/2017; 01/02/2018; 03/12/2019 (H/H 18.6/51.4); 07/15/2019 (H/H 18.0/52.2); 10/14/2019 (H/H 18.1/53.6)  -H/H: 16.2/48.8 (12/09/2019); 16.2/48.8 (11/11/2019; 16.0/48.1 (10/21/2019); 18.1/53.6 (10/08/2019), etc.  -After last therapeutic phlebotomy on 10/14/2019 (H/H 18.1/23.6), his H/H has remained stable, 16.6/49.9 as of 02/10/2020  -Therapeutic phlebotomy 605 cc on 04/21/2020 (H/H 17.7/52.2)  -07/06/2020: H/H 17.4/51.6  -Therapeutic phlebotomy 07/09/2020 (H/H 17.4/51.6), 07/20/2020 (H/H 15.3/45.3), 07/27/2020 (H/H 14.8/45.2)  -08/03/2020: H/H 14.0/42.2 (phlebotomy held)  -08/11/2020: H/H 13.9/41.4 (phlebotomy held)  -09/14/2020: CBC reviewed. H/H 14.6/44.1.  -Since 09/15/2020: Therapeutic phlebotomy: 12/14/2020, 04/19/2021, 08/16/2021, 11/22/2021, 03/21/2022, 04/18/2022        -11/28/2022:  H/H 13.9/45.0   -S/P therapeutic phlebotomy 11/28/2022, per patient preference (534 mL phlebotomized)  -01/03/2023:  H/H 13.1/43.7  -01/30/2023:  H/H 13.0/41.5  -04/24/2023:  Hemoglobin 13.7.  Hematocrit 42.7.  -07/31/2023:  Hemoglobin 15.1.  Hematocrit 47.6; phlebotomized 505 cc  -09/11/2023:  Hemoglobin 13.8.  Hematocrit 45.1.  -11/20/2023:  Hemoglobin 14.9.  Hematocrit 46.3  -therapeutic phlebotomy performed 11/20/2023 (520 cc blood)  -01/22/2024:  CBC reviewed; hemoglobin 13.7, hematocrit 41.9   -11/20/2023:  H/H 14.9/46.3  -03/25/2024:  WBC 4.26, hemoglobin 14.2, hematocrit 41.8, platelets 241 K; CMP reviewed, AST 67  6/3/2024: H/H 16.2,47.6 therapeutic phlebotomy     Plan:  Secondary erythrocytosis  -09/15/2020: No indication of phlebotomy at this time  -Recheck CBC in 1 month to assess the need of therapeutic phlebotomy, then follow-up visit    -No evidence of polycythemia vera  -Has secondary polycythemia  -No symptoms of hyperviscosity state  -However, has behaved more or less like polycythemia vera (persistently elevated H/H), requiring periodic therapeutic  phlebotomy treatments    -Therapeutic phlebotomy as needed  -Target hematocrit <45  -Hold phlebotomy if hematocrit <45  -Continue baby aspirin 81 mg p.o. daily    Hct 42.0; therefore no phlebotomy   Therapeutic phlebotomy to keep Hct <45%.  fu w/np in 2 month with lab work + infusion for possible phlebotomy  Continue ASA 81mg PO daily.    Persistent elevation of ALT,AST  12/9/24 ALT 76, AST 86  Collect Ferritin and iron profile to rule out iron overload      -He has quit smoking    Discussion:  He has secondary erythrocytosis of unclear etiology.   No evidence of polycythemia vera (normal erythropoietin level and negative JANESSA-2 mutation rule out polycythemia vera).  However, in terms of persistence of elevated H/H, has behaved more or less like polycythemia vera.    Had no symptoms of hyperviscosity state at any time despite severe elevation of hemoglobin.    Regardless, to be on the safer side, will continue therapeutic phlebotomy as needed.

## 2024-12-09 NOTE — PROGRESS NOTES
Patient seen in clinic by Blanca Liao NP.  No therapeutic phlebotomy needed today, HCT is 42.  Parameters are to proceed if HCT > 45.    Sylvie Correa RN

## 2025-02-06 DIAGNOSIS — D75.1 SECONDARY ERYTHROCYTOSIS: Primary | ICD-10-CM

## 2025-02-07 ENCOUNTER — TELEPHONE (OUTPATIENT)
Dept: HEMATOLOGY/ONCOLOGY | Facility: CLINIC | Age: 45
End: 2025-02-07
Payer: COMMERCIAL

## 2025-02-10 ENCOUNTER — OFFICE VISIT (OUTPATIENT)
Dept: HEMATOLOGY/ONCOLOGY | Facility: CLINIC | Age: 45
End: 2025-02-10
Payer: COMMERCIAL

## 2025-02-10 ENCOUNTER — APPOINTMENT (OUTPATIENT)
Dept: HEMATOLOGY/ONCOLOGY | Facility: CLINIC | Age: 45
End: 2025-02-10
Payer: COMMERCIAL

## 2025-02-10 ENCOUNTER — INFUSION (OUTPATIENT)
Dept: INFUSION THERAPY | Facility: HOSPITAL | Age: 45
End: 2025-02-10
Attending: INTERNAL MEDICINE
Payer: COMMERCIAL

## 2025-02-10 VITALS
SYSTOLIC BLOOD PRESSURE: 138 MMHG | BODY MASS INDEX: 27.76 KG/M2 | OXYGEN SATURATION: 100 % | TEMPERATURE: 98 F | HEIGHT: 65 IN | WEIGHT: 166.63 LBS | DIASTOLIC BLOOD PRESSURE: 89 MMHG | RESPIRATION RATE: 18 BRPM | HEART RATE: 89 BPM

## 2025-02-10 DIAGNOSIS — D75.1 SECONDARY ERYTHROCYTOSIS: Primary | ICD-10-CM

## 2025-02-10 DIAGNOSIS — D75.1 SECONDARY ERYTHROCYTOSIS: ICD-10-CM

## 2025-02-10 LAB
ALBUMIN SERPL-MCNC: 3.6 G/DL (ref 3.5–5)
ALBUMIN/GLOB SERPL: 0.8 RATIO (ref 1.1–2)
ALP SERPL-CCNC: 96 UNIT/L (ref 40–150)
ALT SERPL-CCNC: 39 UNIT/L (ref 0–55)
ANION GAP SERPL CALC-SCNC: 12 MEQ/L
AST SERPL-CCNC: 59 UNIT/L (ref 5–34)
BASOPHILS # BLD AUTO: 0.06 X10(3)/MCL
BASOPHILS NFR BLD AUTO: 1.4 %
BILIRUB SERPL-MCNC: 0.2 MG/DL
BUN SERPL-MCNC: 8.6 MG/DL (ref 8.9–20.6)
CALCIUM SERPL-MCNC: 8.9 MG/DL (ref 8.4–10.2)
CHLORIDE SERPL-SCNC: 109 MMOL/L (ref 98–107)
CO2 SERPL-SCNC: 22 MMOL/L (ref 22–29)
CREAT SERPL-MCNC: 0.77 MG/DL (ref 0.72–1.25)
CREAT/UREA NIT SERPL: 11
EOSINOPHIL # BLD AUTO: 0.05 X10(3)/MCL (ref 0–0.9)
EOSINOPHIL NFR BLD AUTO: 1.1 %
ERYTHROCYTE [DISTWIDTH] IN BLOOD BY AUTOMATED COUNT: 18.9 % (ref 11.5–17)
FERRITIN SERPL-MCNC: 9.22 NG/ML (ref 21.81–274.66)
GFR SERPLBLD CREATININE-BSD FMLA CKD-EPI: >60 ML/MIN/1.73/M2
GLOBULIN SER-MCNC: 4.3 GM/DL (ref 2.4–3.5)
GLUCOSE SERPL-MCNC: 95 MG/DL (ref 74–100)
HCT VFR BLD AUTO: 45.4 % (ref 42–52)
HGB BLD-MCNC: 14.5 G/DL (ref 14–18)
IMM GRANULOCYTES # BLD AUTO: 0.01 X10(3)/MCL (ref 0–0.04)
IMM GRANULOCYTES NFR BLD AUTO: 0.2 %
IRON SATN MFR SERPL: 7 % (ref 20–50)
IRON SERPL-MCNC: 26 UG/DL (ref 65–175)
LYMPHOCYTES # BLD AUTO: 1.22 X10(3)/MCL (ref 0.6–4.6)
LYMPHOCYTES NFR BLD AUTO: 27.7 %
MCH RBC QN AUTO: 26.2 PG (ref 27–31)
MCHC RBC AUTO-ENTMCNC: 31.9 G/DL (ref 33–36)
MCV RBC AUTO: 81.9 FL (ref 80–94)
MONOCYTES # BLD AUTO: 0.56 X10(3)/MCL (ref 0.1–1.3)
MONOCYTES NFR BLD AUTO: 12.7 %
NEUTROPHILS # BLD AUTO: 2.5 X10(3)/MCL (ref 2.1–9.2)
NEUTROPHILS NFR BLD AUTO: 56.9 %
NRBC BLD AUTO-RTO: 0 %
PLATELET # BLD AUTO: 267 X10(3)/MCL (ref 130–400)
PMV BLD AUTO: 10.2 FL (ref 7.4–10.4)
POTASSIUM SERPL-SCNC: 3.6 MMOL/L (ref 3.5–5.1)
PROT SERPL-MCNC: 7.9 GM/DL (ref 6.4–8.3)
RBC # BLD AUTO: 5.54 X10(6)/MCL (ref 4.7–6.1)
SODIUM SERPL-SCNC: 143 MMOL/L (ref 136–145)
TIBC SERPL-MCNC: 374 UG/DL (ref 60–240)
TIBC SERPL-MCNC: 400 UG/DL (ref 250–450)
TRANSFERRIN SERPL-MCNC: 378 MG/DL (ref 174–364)
WBC # BLD AUTO: 4.4 X10(3)/MCL (ref 4.5–11.5)

## 2025-02-10 PROCEDURE — 1160F RVW MEDS BY RX/DR IN RCRD: CPT | Mod: CPTII,,,

## 2025-02-10 PROCEDURE — 4010F ACE/ARB THERAPY RXD/TAKEN: CPT | Mod: CPTII,,,

## 2025-02-10 PROCEDURE — 3079F DIAST BP 80-89 MM HG: CPT | Mod: CPTII,,,

## 2025-02-10 PROCEDURE — 36415 COLL VENOUS BLD VENIPUNCTURE: CPT

## 2025-02-10 PROCEDURE — 1159F MED LIST DOCD IN RCRD: CPT | Mod: CPTII,,,

## 2025-02-10 PROCEDURE — 3008F BODY MASS INDEX DOCD: CPT | Mod: CPTII,,,

## 2025-02-10 PROCEDURE — 82728 ASSAY OF FERRITIN: CPT

## 2025-02-10 PROCEDURE — 85025 COMPLETE CBC W/AUTO DIFF WBC: CPT

## 2025-02-10 PROCEDURE — 3075F SYST BP GE 130 - 139MM HG: CPT | Mod: CPTII,,,

## 2025-02-10 PROCEDURE — 80053 COMPREHEN METABOLIC PANEL: CPT

## 2025-02-10 PROCEDURE — 99213 OFFICE O/P EST LOW 20 MIN: CPT | Mod: S$PBB,,,

## 2025-02-10 PROCEDURE — 99214 OFFICE O/P EST MOD 30 MIN: CPT | Mod: PBBFAC

## 2025-02-10 PROCEDURE — 83550 IRON BINDING TEST: CPT

## 2025-02-10 NOTE — PROGRESS NOTES
Problem List:  Severe erythrocytosis    Current Treatment:  ASA 81mg po daily.  Therapeutic phlebotomy to keep Hct <45%    History of Present Illness:  The patient is a pleasant 36-year-old -American man. For a full, detailed history, please see the note dated 8/9/2021.    Interval History 2/10/25:   Patient presents to the clinic alone for ongoing management of erythrocytosis.He denies any significant fatigue, abnormal bleeding, unusual headaches, dizziness. He does admit to having pruritis. Lab work reviewed with patient. All future appointments were discussed with the patient.     Review of Systems   All other systems reviewed and are negative.       Physical Exam  Constitutional:       Appearance: Normal appearance.   HENT:      Head: Normocephalic.      Mouth/Throat:      Mouth: Mucous membranes are moist.   Eyes:      Pupils: Pupils are equal, round, and reactive to light.   Cardiovascular:      Rate and Rhythm: Normal rate and regular rhythm.      Pulses: Normal pulses.      Heart sounds: Normal heart sounds.   Pulmonary:      Effort: Pulmonary effort is normal.      Breath sounds: Normal breath sounds.   Abdominal:      General: Bowel sounds are normal.      Palpations: Abdomen is soft.   Musculoskeletal:         General: Normal range of motion.      Cervical back: Normal range of motion.   Skin:     General: Skin is warm and dry.      Capillary Refill: Capillary refill takes less than 2 seconds.   Neurological:      General: No focal deficit present.      Mental Status: He is alert and oriented to person, place, and time.   Psychiatric:         Attention and Perception: Attention normal.         Mood and Affect: Affect normal.         Speech: Speech normal.         Behavior: Behavior normal.         Thought Content: Thought content normal.       Assessment:  1. 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 Erythropoietin level is not suppressed; this is a point against polycythemia vera. Bone marrow examination does not suggest polycythemia vera. Calretinin mutation negative in bone marrow. JANESSA-2 mutation negative (both JANESSA-2 V617F, as well as exons 12-15 are negative)  -->  Polycythemia vera ruled out   (erythropoietin level not suppressed; JANESSA mutation negative)  Patient has some kind of secondary erythrocytosis   -Phlebotomized: 11/20/2017; 12/11/2017; 01/02/2018; 03/12/2019 (H/H 18.6/51.4); 07/15/2019 (H/H 18.0/52.2); 10/14/2019 (H/H 18.1/53.6)  -H/H: 16.2/48.8 (12/09/2019); 16.2/48.8 (11/11/2019; 16.0/48.1 (10/21/2019); 18.1/53.6 (10/08/2019), etc.  -After last therapeutic phlebotomy on 10/14/2019 (H/H 18.1/23.6), his H/H has remained stable, 16.6/49.9 as of 02/10/2020  -Therapeutic phlebotomy 605 cc on 04/21/2020 (H/H 17.7/52.2)  -07/06/2020: H/H 17.4/51.6  -Therapeutic phlebotomy 07/09/2020 (H/H 17.4/51.6), 07/20/2020 (H/H 15.3/45.3), 07/27/2020 (H/H 14.8/45.2)  -08/03/2020: H/H 14.0/42.2 (phlebotomy held)  -08/11/2020: H/H 13.9/41.4 (phlebotomy held)  -09/14/2020: CBC reviewed. H/H 14.6/44.1.  -Since 09/15/2020: Therapeutic phlebotomy: 12/14/2020, 04/19/2021, 08/16/2021, 11/22/2021, 03/21/2022, 04/18/2022        -11/28/2022:  H/H 13.9/45.0   -S/P therapeutic phlebotomy 11/28/2022, per patient preference (534 mL phlebotomized)  -01/03/2023:  H/H 13.1/43.7  -01/30/2023:  H/H 13.0/41.5  -04/24/2023:  Hemoglobin 13.7.  Hematocrit 42.7.  -07/31/2023:  Hemoglobin 15.1.  Hematocrit 47.6; phlebotomized 505 cc  -09/11/2023:  Hemoglobin 13.8.  Hematocrit 45.1.  -11/20/2023:  Hemoglobin 14.9.  Hematocrit 46.3  -therapeutic phlebotomy performed 11/20/2023 (520 cc blood)  -01/22/2024:  CBC reviewed; hemoglobin 13.7, hematocrit 41.9   -11/20/2023:  H/H 14.9/46.3  -03/25/2024:  WBC 4.26, hemoglobin 14.2, hematocrit 41.8, platelets 241 K; CMP  reviewed, AST 67  6/3/2024: H/H 16.2,47.6 therapeutic phlebotomy     Plan:  Secondary erythrocytosis  -09/15/2020: No indication of phlebotomy at this time  -Recheck CBC in 1 month to assess the need of therapeutic phlebotomy, then follow-up visit    -No evidence of polycythemia vera  -Has secondary polycythemia  -No symptoms of hyperviscosity state  -However, has behaved more or less like polycythemia vera (persistently elevated H/H), requiring periodic therapeutic phlebotomy treatments    -Therapeutic phlebotomy as needed  -Target hematocrit <45  -Hold phlebotomy if hematocrit <45  -Continue baby aspirin 81 mg p.o. daily    Hct 45.4 - This is boarderline. We will repeat labwork in 1 week   Therapeutic phlebotomy to keep Hct <45%.  FU with me in 1 week with lab work + infusion for possible phlebotomy  Continue ASA 81mg PO daily.      Discussion:  He has secondary erythrocytosis of unclear etiology.   No evidence of polycythemia vera (normal erythropoietin level and negative JANESSA-2 mutation rule out polycythemia vera).  However, in terms of persistence of elevated H/H, has behaved more or less like polycythemia vera.    Had no symptoms of hyperviscosity state at any time despite severe elevation of hemoglobin.    Regardless, to be on the safer side, will continue therapeutic phlebotomy as needed.      Follow up in about 1 week (around 2/17/2025) for Labs, With Christianne Echols-therp phleb.

## 2025-02-10 NOTE — NURSING
Patient had labs today and provider visit. HBG 45.2. Order was for therapeutic phlebotomy over HGB over 45%.Abundio Martin NP, did not order treatment and sceduled patient to return in one week for labs.

## 2025-02-14 ENCOUNTER — TELEPHONE (OUTPATIENT)
Dept: HEMATOLOGY/ONCOLOGY | Facility: CLINIC | Age: 45
End: 2025-02-14
Payer: COMMERCIAL

## 2025-02-17 ENCOUNTER — INFUSION (OUTPATIENT)
Dept: INFUSION THERAPY | Facility: HOSPITAL | Age: 45
End: 2025-02-17
Attending: INTERNAL MEDICINE
Payer: COMMERCIAL

## 2025-02-17 ENCOUNTER — OFFICE VISIT (OUTPATIENT)
Dept: HEMATOLOGY/ONCOLOGY | Facility: CLINIC | Age: 45
End: 2025-02-17
Payer: COMMERCIAL

## 2025-02-17 VITALS
RESPIRATION RATE: 18 BRPM | HEART RATE: 92 BPM | SYSTOLIC BLOOD PRESSURE: 130 MMHG | DIASTOLIC BLOOD PRESSURE: 89 MMHG | TEMPERATURE: 99 F | OXYGEN SATURATION: 100 %

## 2025-02-17 VITALS
HEART RATE: 92 BPM | TEMPERATURE: 99 F | WEIGHT: 164.81 LBS | HEIGHT: 65 IN | DIASTOLIC BLOOD PRESSURE: 80 MMHG | SYSTOLIC BLOOD PRESSURE: 144 MMHG | BODY MASS INDEX: 27.46 KG/M2 | RESPIRATION RATE: 18 BRPM | OXYGEN SATURATION: 100 %

## 2025-02-17 DIAGNOSIS — L29.9 PRURITUS: ICD-10-CM

## 2025-02-17 DIAGNOSIS — D75.1 SECONDARY ERYTHROCYTOSIS: Primary | ICD-10-CM

## 2025-02-17 PROCEDURE — 99213 OFFICE O/P EST LOW 20 MIN: CPT | Mod: PBBFAC,25

## 2025-02-17 PROCEDURE — 99195 PHLEBOTOMY: CPT

## 2025-02-17 RX ORDER — LIDOCAINE HYDROCHLORIDE 10 MG/ML
1 INJECTION, SOLUTION EPIDURAL; INFILTRATION; INTRACAUDAL; PERINEURAL ONCE
OUTPATIENT
Start: 2025-02-24 | End: 2025-02-24

## 2025-02-17 RX ORDER — LIDOCAINE HYDROCHLORIDE 10 MG/ML
1 INJECTION, SOLUTION EPIDURAL; INFILTRATION; INTRACAUDAL; PERINEURAL ONCE
Status: DISCONTINUED | OUTPATIENT
Start: 2025-02-17 | End: 2025-02-17 | Stop reason: HOSPADM

## 2025-02-17 NOTE — Clinical Note
-Proceed with therapeutic phlebotomy today in infusion for hematocrit of 46.7 -Have patient return to infusion next week with labs prior (CBC) followed by a possible therapeutic phlebotomy to maintain a hematocrit of less than 45 -RTC with me in 2 weeks with labs prior (CBC) followed by possible therapeutic phlebotomy in infusion

## 2025-02-17 NOTE — NURSING
Patient had labs and provider visit with Abundio Martin NP. Today's HCT 46.7. Therapeutic phlebotomy performed. 500 ml of blood removed. Patient tolerated procedure.

## 2025-02-24 ENCOUNTER — INFUSION (OUTPATIENT)
Dept: INFUSION THERAPY | Facility: HOSPITAL | Age: 45
End: 2025-02-24
Attending: INTERNAL MEDICINE
Payer: COMMERCIAL

## 2025-02-24 ENCOUNTER — LAB VISIT (OUTPATIENT)
Dept: HEMATOLOGY/ONCOLOGY | Facility: CLINIC | Age: 45
End: 2025-02-24
Payer: COMMERCIAL

## 2025-02-24 ENCOUNTER — TELEPHONE (OUTPATIENT)
Dept: HEMATOLOGY/ONCOLOGY | Facility: CLINIC | Age: 45
End: 2025-02-24
Payer: COMMERCIAL

## 2025-02-24 VITALS
SYSTOLIC BLOOD PRESSURE: 134 MMHG | BODY MASS INDEX: 27.7 KG/M2 | TEMPERATURE: 99 F | OXYGEN SATURATION: 96 % | DIASTOLIC BLOOD PRESSURE: 80 MMHG | RESPIRATION RATE: 20 BRPM | HEART RATE: 86 BPM | HEIGHT: 65 IN | WEIGHT: 166.25 LBS

## 2025-02-24 DIAGNOSIS — L29.9 PRURITUS: ICD-10-CM

## 2025-02-24 DIAGNOSIS — D75.1 SECONDARY ERYTHROCYTOSIS: Primary | ICD-10-CM

## 2025-02-24 DIAGNOSIS — E87.6 HYPOKALEMIA: Primary | ICD-10-CM

## 2025-02-24 DIAGNOSIS — D75.1 SECONDARY ERYTHROCYTOSIS: ICD-10-CM

## 2025-02-24 LAB
ALBUMIN SERPL-MCNC: 3.5 G/DL (ref 3.5–5)
ALBUMIN/GLOB SERPL: 0.8 RATIO (ref 1.1–2)
ALP SERPL-CCNC: 96 UNIT/L (ref 40–150)
ALT SERPL-CCNC: 44 UNIT/L (ref 0–55)
ANION GAP SERPL CALC-SCNC: 9 MEQ/L
AST SERPL-CCNC: 70 UNIT/L (ref 5–34)
BASOPHILS # BLD AUTO: 0.06 X10(3)/MCL
BASOPHILS NFR BLD AUTO: 1.5 %
BILIRUB SERPL-MCNC: 0.2 MG/DL
BUN SERPL-MCNC: 4.8 MG/DL (ref 8.9–20.6)
CALCIUM SERPL-MCNC: 8.8 MG/DL (ref 8.4–10.2)
CHLORIDE SERPL-SCNC: 109 MMOL/L (ref 98–107)
CO2 SERPL-SCNC: 23 MMOL/L (ref 22–29)
CREAT SERPL-MCNC: 0.76 MG/DL (ref 0.72–1.25)
CREAT/UREA NIT SERPL: 6
EOSINOPHIL # BLD AUTO: 0.03 X10(3)/MCL (ref 0–0.9)
EOSINOPHIL NFR BLD AUTO: 0.7 %
ERYTHROCYTE [DISTWIDTH] IN BLOOD BY AUTOMATED COUNT: 18.1 % (ref 11.5–17)
GFR SERPLBLD CREATININE-BSD FMLA CKD-EPI: >60 ML/MIN/1.73/M2
GLOBULIN SER-MCNC: 4.2 GM/DL (ref 2.4–3.5)
GLUCOSE SERPL-MCNC: 108 MG/DL (ref 74–100)
HCT VFR BLD AUTO: 41.2 % (ref 42–52)
HGB BLD-MCNC: 12.9 G/DL (ref 14–18)
IMM GRANULOCYTES # BLD AUTO: 0.01 X10(3)/MCL (ref 0–0.04)
IMM GRANULOCYTES NFR BLD AUTO: 0.2 %
LYMPHOCYTES # BLD AUTO: 1.1 X10(3)/MCL (ref 0.6–4.6)
LYMPHOCYTES NFR BLD AUTO: 26.9 %
MCH RBC QN AUTO: 26.4 PG (ref 27–31)
MCHC RBC AUTO-ENTMCNC: 31.3 G/DL (ref 33–36)
MCV RBC AUTO: 84.4 FL (ref 80–94)
MONOCYTES # BLD AUTO: 0.56 X10(3)/MCL (ref 0.1–1.3)
MONOCYTES NFR BLD AUTO: 13.7 %
NEUTROPHILS # BLD AUTO: 2.33 X10(3)/MCL (ref 2.1–9.2)
NEUTROPHILS NFR BLD AUTO: 57 %
NRBC BLD AUTO-RTO: 0 %
PLATELET # BLD AUTO: 268 X10(3)/MCL (ref 130–400)
PMV BLD AUTO: 10.2 FL (ref 7.4–10.4)
POTASSIUM SERPL-SCNC: 3.2 MMOL/L (ref 3.5–5.1)
PROT SERPL-MCNC: 7.7 GM/DL (ref 6.4–8.3)
RBC # BLD AUTO: 4.88 X10(6)/MCL (ref 4.7–6.1)
SODIUM SERPL-SCNC: 141 MMOL/L (ref 136–145)
WBC # BLD AUTO: 4.09 X10(3)/MCL (ref 4.5–11.5)

## 2025-02-24 PROCEDURE — 80053 COMPREHEN METABOLIC PANEL: CPT

## 2025-02-24 PROCEDURE — 85025 COMPLETE CBC W/AUTO DIFF WBC: CPT

## 2025-02-24 PROCEDURE — 36415 COLL VENOUS BLD VENIPUNCTURE: CPT

## 2025-02-24 RX ORDER — POTASSIUM CHLORIDE 20 MEQ/1
20 TABLET, EXTENDED RELEASE ORAL 2 TIMES DAILY
Qty: 28 TABLET | Refills: 0 | Status: SHIPPED | OUTPATIENT
Start: 2025-02-24 | End: 2025-03-10

## 2025-02-24 NOTE — NURSING
1000 Arrive for tx 6 Phlebotomy not required Hct results 41.2. 1030 LAURA Martin NP notified of K 3.1 via secure chat ordered Potassium 20 meq orally twice daily for 2 weeks and re-check on week 3.

## 2025-03-03 ENCOUNTER — OFFICE VISIT (OUTPATIENT)
Dept: HEMATOLOGY/ONCOLOGY | Facility: CLINIC | Age: 45
End: 2025-03-03
Payer: COMMERCIAL

## 2025-03-03 ENCOUNTER — INFUSION (OUTPATIENT)
Dept: INFUSION THERAPY | Facility: HOSPITAL | Age: 45
End: 2025-03-03
Attending: INTERNAL MEDICINE
Payer: COMMERCIAL

## 2025-03-03 VITALS
TEMPERATURE: 99 F | BODY MASS INDEX: 27.93 KG/M2 | WEIGHT: 167.63 LBS | OXYGEN SATURATION: 99 % | SYSTOLIC BLOOD PRESSURE: 123 MMHG | HEART RATE: 86 BPM | HEIGHT: 65 IN | DIASTOLIC BLOOD PRESSURE: 76 MMHG | RESPIRATION RATE: 16 BRPM

## 2025-03-03 DIAGNOSIS — R74.8 ELEVATED LIVER ENZYMES: ICD-10-CM

## 2025-03-03 DIAGNOSIS — D75.1 SECONDARY ERYTHROCYTOSIS: Primary | ICD-10-CM

## 2025-03-03 PROCEDURE — 3074F SYST BP LT 130 MM HG: CPT | Mod: CPTII,,,

## 2025-03-03 PROCEDURE — 1159F MED LIST DOCD IN RCRD: CPT | Mod: CPTII,,,

## 2025-03-03 PROCEDURE — 3008F BODY MASS INDEX DOCD: CPT | Mod: CPTII,,,

## 2025-03-03 PROCEDURE — 99215 OFFICE O/P EST HI 40 MIN: CPT | Mod: PBBFAC

## 2025-03-03 PROCEDURE — 3078F DIAST BP <80 MM HG: CPT | Mod: CPTII,,,

## 2025-03-03 PROCEDURE — 99213 OFFICE O/P EST LOW 20 MIN: CPT | Mod: S$PBB,,,

## 2025-03-03 PROCEDURE — 1160F RVW MEDS BY RX/DR IN RCRD: CPT | Mod: CPTII,,,

## 2025-03-03 PROCEDURE — 4010F ACE/ARB THERAPY RXD/TAKEN: CPT | Mod: CPTII,,,

## 2025-03-03 NOTE — Clinical Note
· Schedule patient to return to infusion in 2 weeks(3/17) with labs prior (CBC) followed by possible therp phleb  · RTC with me in  1 month (3/31) with labs prior (CBC/CMP) followed by possible therapeutic phleb

## 2025-03-03 NOTE — PROGRESS NOTES
Problem List:  Severe erythrocytosis    Current Treatment:  ASA 81mg po daily.  Therapeutic phlebotomy to keep Hct <45%    History of Present Illness:  The patient is a pleasant 36-year-old -American man. For a full, detailed history, please see the note dated 8/9/2021.    Interval History 3/3/25:   Patient presents to the clinic alone for ongoing management of erythrocytosis.He denies any significant fatigue, abnormal bleeding, unusual headaches, dizziness. He reports that his itching has improved since last visit. He reports feeling well overall. Lab work reviewed with patient. All future appointments were discussed with the patient.     Review of Systems   All other systems reviewed and are negative.     Physical Exam  Constitutional:       Appearance: Normal appearance.   HENT:      Head: Normocephalic.      Mouth/Throat:      Mouth: Mucous membranes are moist.   Eyes:      Pupils: Pupils are equal, round, and reactive to light.   Cardiovascular:      Rate and Rhythm: Normal rate and regular rhythm.      Pulses: Normal pulses.      Heart sounds: Normal heart sounds.   Pulmonary:      Effort: Pulmonary effort is normal.      Breath sounds: Normal breath sounds.   Abdominal:      General: Bowel sounds are normal.      Palpations: Abdomen is soft.   Musculoskeletal:         General: Normal range of motion.      Cervical back: Normal range of motion.   Skin:     General: Skin is warm and dry.      Capillary Refill: Capillary refill takes less than 2 seconds.   Neurological:      General: No focal deficit present.      Mental Status: He is alert and oriented to person, place, and time.   Psychiatric:         Attention and Perception: Attention normal.         Mood and Affect: Affect normal.         Speech: Speech normal.         Behavior: Behavior normal.         Thought Content: Thought content normal.       Assessment:  1. 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 Erythropoietin level is not suppressed; this is a point against polycythemia vera. Bone marrow examination does not suggest polycythemia vera. Calretinin mutation negative in bone marrow. JANESSA-2 mutation negative (both JANESSA-2 V617F, as well as exons 12-15 are negative)  -->  Polycythemia vera ruled out   (erythropoietin level not suppressed; JANESSA mutation negative)  Patient has some kind of secondary erythrocytosis   -Phlebotomized: 11/20/2017; 12/11/2017; 01/02/2018; 03/12/2019 (H/H 18.6/51.4); 07/15/2019 (H/H 18.0/52.2); 10/14/2019 (H/H 18.1/53.6)  -H/H: 16.2/48.8 (12/09/2019); 16.2/48.8 (11/11/2019; 16.0/48.1 (10/21/2019); 18.1/53.6 (10/08/2019), etc.  -After last therapeutic phlebotomy on 10/14/2019 (H/H 18.1/23.6), his H/H has remained stable, 16.6/49.9 as of 02/10/2020  -Therapeutic phlebotomy 605 cc on 04/21/2020 (H/H 17.7/52.2)  -07/06/2020: H/H 17.4/51.6  -Therapeutic phlebotomy 07/09/2020 (H/H 17.4/51.6), 07/20/2020 (H/H 15.3/45.3), 07/27/2020 (H/H 14.8/45.2)  -08/03/2020: H/H 14.0/42.2 (phlebotomy held)  -08/11/2020: H/H 13.9/41.4 (phlebotomy held)  -09/14/2020: CBC reviewed. H/H 14.6/44.1.  -Since 09/15/2020: Therapeutic phlebotomy: 12/14/2020, 04/19/2021, 08/16/2021, 11/22/2021, 03/21/2022, 04/18/2022        -11/28/2022:  H/H 13.9/45.0   -S/P therapeutic phlebotomy 11/28/2022, per patient preference (534 mL phlebotomized)  -01/03/2023:  H/H 13.1/43.7  -01/30/2023:  H/H 13.0/41.5  -04/24/2023:  Hemoglobin 13.7.  Hematocrit 42.7.  -07/31/2023:  Hemoglobin 15.1.  Hematocrit 47.6; phlebotomized 505 cc  -09/11/2023:  Hemoglobin 13.8.  Hematocrit 45.1.  -11/20/2023:  Hemoglobin 14.9.  Hematocrit 46.3  -therapeutic phlebotomy performed 11/20/2023 (520 cc blood)  -01/22/2024:  CBC reviewed; hemoglobin 13.7, hematocrit 41.9   -11/20/2023:  H/H 14.9/46.3  -03/25/2024:  WBC 4.26,  hemoglobin 14.2, hematocrit 41.8, platelets 241 K; CMP reviewed, AST 67  6/3/2024: H/H 16.2,47.6 therapeutic phlebotomy     Plan:  Secondary erythrocytosis  -09/15/2020: No indication of phlebotomy at this time  -Recheck CBC in 1 month to assess the need of therapeutic phlebotomy, then follow-up visit    -No evidence of polycythemia vera  -Has secondary polycythemia  -No symptoms of hyperviscosity state  -However, has behaved more or less like polycythemia vera (persistently elevated H/H), requiring periodic therapeutic phlebotomy treatments    -Therapeutic phlebotomy as needed  -Target hematocrit <45  -Hold phlebotomy if hematocrit <45  -Continue baby aspirin 81 mg p.o. daily    Hct 42.7 today in clinic   HOLD therapeutic phleb today in infusion due to HCT being 42.7  Schedule patient to return to infusion in 2 weeks (3/17) with labs prior (CBC) followed by possible therp phleb   RTC with me in  1 month (3/31) with labs prior (CBC/CMP) followed by possible therapeutic phleb  Therapeutic phlebotomy to keep Hct <45%.  Continue ASA 81mg PO daily.    Elevated Liver Enzymes:   Denies any abdominal pain  Reports drinking alcohol socially   Educated patient on the use of limiting the amount of alcohol he consumes and the use of hydration.  Will continue to monitor       Discussion:  He has secondary erythrocytosis of unclear etiology.   No evidence of polycythemia vera (normal erythropoietin level and negative JANESSA-2 mutation rule out polycythemia vera).  However, in terms of persistence of elevated H/H, has behaved more or less like polycythemia vera.    Had no symptoms of hyperviscosity state at any time despite severe elevation of hemoglobin.    Regardless, to be on the safer side, will continue therapeutic phlebotomy as needed.      No follow-ups on file.

## 2025-03-03 NOTE — NURSING
Pt did labs and saw A Mario NP.  No phlebotomy needed today as pt's hct is 42.7.  Follow up appts being made.

## 2025-03-17 ENCOUNTER — DOCUMENTATION ONLY (OUTPATIENT)
Dept: INFUSION THERAPY | Facility: HOSPITAL | Age: 45
End: 2025-03-17
Payer: COMMERCIAL

## 2025-03-17 NOTE — PROGRESS NOTES
Pt did not present for labs & possible therapeutic phlebotomy today; next appt on 3/3125 for labs, LAURA Martin Np & possible therapeutic phlebotomy.

## 2025-04-03 DIAGNOSIS — D75.1 SECONDARY ERYTHROCYTOSIS: Primary | ICD-10-CM

## 2025-04-07 ENCOUNTER — LAB VISIT (OUTPATIENT)
Dept: HEMATOLOGY/ONCOLOGY | Facility: CLINIC | Age: 45
End: 2025-04-07
Payer: COMMERCIAL

## 2025-04-07 ENCOUNTER — OFFICE VISIT (OUTPATIENT)
Dept: HEMATOLOGY/ONCOLOGY | Facility: CLINIC | Age: 45
End: 2025-04-07
Payer: COMMERCIAL

## 2025-04-07 VITALS
WEIGHT: 167 LBS | SYSTOLIC BLOOD PRESSURE: 126 MMHG | DIASTOLIC BLOOD PRESSURE: 83 MMHG | OXYGEN SATURATION: 99 % | BODY MASS INDEX: 27.82 KG/M2 | HEIGHT: 65 IN | HEART RATE: 100 BPM | RESPIRATION RATE: 18 BRPM | TEMPERATURE: 99 F

## 2025-04-07 DIAGNOSIS — R74.8 ELEVATED LIVER ENZYMES: ICD-10-CM

## 2025-04-07 DIAGNOSIS — D75.1 SECONDARY ERYTHROCYTOSIS: ICD-10-CM

## 2025-04-07 DIAGNOSIS — D75.1 SECONDARY ERYTHROCYTOSIS: Primary | ICD-10-CM

## 2025-04-07 LAB
ALBUMIN SERPL-MCNC: 3.4 G/DL (ref 3.5–5)
ALBUMIN/GLOB SERPL: 0.9 RATIO (ref 1.1–2)
ALP SERPL-CCNC: 94 UNIT/L (ref 40–150)
ALT SERPL-CCNC: 65 UNIT/L (ref 0–55)
ANION GAP SERPL CALC-SCNC: 4 MEQ/L
AST SERPL-CCNC: 65 UNIT/L (ref 11–45)
BASOPHILS # BLD AUTO: 0.05 X10(3)/MCL
BASOPHILS NFR BLD AUTO: 1.1 %
BILIRUB SERPL-MCNC: 0.1 MG/DL
BUN SERPL-MCNC: 7.3 MG/DL (ref 8.9–20.6)
CALCIUM SERPL-MCNC: 8.6 MG/DL (ref 8.4–10.2)
CHLORIDE SERPL-SCNC: 111 MMOL/L (ref 98–107)
CO2 SERPL-SCNC: 23 MMOL/L (ref 22–29)
CREAT SERPL-MCNC: 0.93 MG/DL (ref 0.72–1.25)
CREAT/UREA NIT SERPL: 8
EOSINOPHIL # BLD AUTO: 0.04 X10(3)/MCL (ref 0–0.9)
EOSINOPHIL NFR BLD AUTO: 0.9 %
ERYTHROCYTE [DISTWIDTH] IN BLOOD BY AUTOMATED COUNT: 15.8 % (ref 11.5–17)
GFR SERPLBLD CREATININE-BSD FMLA CKD-EPI: >60 ML/MIN/1.73/M2
GLOBULIN SER-MCNC: 4 GM/DL (ref 2.4–3.5)
GLUCOSE SERPL-MCNC: 106 MG/DL (ref 74–100)
HCT VFR BLD AUTO: 41 % (ref 42–52)
HGB BLD-MCNC: 13.1 G/DL (ref 14–18)
IMM GRANULOCYTES # BLD AUTO: 0.01 X10(3)/MCL (ref 0–0.04)
IMM GRANULOCYTES NFR BLD AUTO: 0.2 %
LYMPHOCYTES # BLD AUTO: 1.03 X10(3)/MCL (ref 0.6–4.6)
LYMPHOCYTES NFR BLD AUTO: 22.6 %
MCH RBC QN AUTO: 26.5 PG (ref 27–31)
MCHC RBC AUTO-ENTMCNC: 32 G/DL (ref 33–36)
MCV RBC AUTO: 83 FL (ref 80–94)
MONOCYTES # BLD AUTO: 0.47 X10(3)/MCL (ref 0.1–1.3)
MONOCYTES NFR BLD AUTO: 10.3 %
NEUTROPHILS # BLD AUTO: 2.96 X10(3)/MCL (ref 2.1–9.2)
NEUTROPHILS NFR BLD AUTO: 64.9 %
NRBC BLD AUTO-RTO: 0 %
PLATELET # BLD AUTO: 211 X10(3)/MCL (ref 130–400)
PMV BLD AUTO: 9.4 FL (ref 7.4–10.4)
POTASSIUM SERPL-SCNC: 3.5 MMOL/L (ref 3.5–5.1)
PROT SERPL-MCNC: 7.4 GM/DL (ref 6.4–8.3)
RBC # BLD AUTO: 4.94 X10(6)/MCL (ref 4.7–6.1)
SODIUM SERPL-SCNC: 138 MMOL/L (ref 136–145)
WBC # BLD AUTO: 4.56 X10(3)/MCL (ref 4.5–11.5)

## 2025-04-07 PROCEDURE — 3074F SYST BP LT 130 MM HG: CPT | Mod: CPTII,,,

## 2025-04-07 PROCEDURE — 3079F DIAST BP 80-89 MM HG: CPT | Mod: CPTII,,,

## 2025-04-07 PROCEDURE — 4010F ACE/ARB THERAPY RXD/TAKEN: CPT | Mod: CPTII,,,

## 2025-04-07 PROCEDURE — 85025 COMPLETE CBC W/AUTO DIFF WBC: CPT

## 2025-04-07 PROCEDURE — 1160F RVW MEDS BY RX/DR IN RCRD: CPT | Mod: CPTII,,,

## 2025-04-07 PROCEDURE — 1159F MED LIST DOCD IN RCRD: CPT | Mod: CPTII,,,

## 2025-04-07 PROCEDURE — 3008F BODY MASS INDEX DOCD: CPT | Mod: CPTII,,,

## 2025-04-07 PROCEDURE — 36415 COLL VENOUS BLD VENIPUNCTURE: CPT

## 2025-04-07 PROCEDURE — 99213 OFFICE O/P EST LOW 20 MIN: CPT | Mod: S$PBB,,,

## 2025-04-07 PROCEDURE — 80053 COMPREHEN METABOLIC PANEL: CPT

## 2025-04-07 PROCEDURE — 99214 OFFICE O/P EST MOD 30 MIN: CPT | Mod: PBBFAC

## 2025-04-07 NOTE — PROGRESS NOTES
Problem List:  Severe erythrocytosis    Current Treatment:  ASA 81mg po daily.  Therapeutic phlebotomy to keep Hct <45%    History of Present Illness:  The patient is a pleasant 36-year-old -American man. For a full, detailed history, please see the note dated 8/9/2021.    Interval History 4/7/25:   Patient presents to the clinic alone for ongoing management of erythrocytosis.He denies any significant fatigue, abnormal bleeding, unusual headaches, dizziness. He reports that his itching has improved since last visit. He reports feeling well overall. Lab work reviewed with patient. All future appointments were discussed with the patient.     Review of Systems   All other systems reviewed and are negative.     Physical Exam  Constitutional:       Appearance: Normal appearance.   HENT:      Head: Normocephalic.      Mouth/Throat:      Mouth: Mucous membranes are moist.   Eyes:      Pupils: Pupils are equal, round, and reactive to light.   Cardiovascular:      Rate and Rhythm: Normal rate and regular rhythm.      Pulses: Normal pulses.      Heart sounds: Normal heart sounds.   Pulmonary:      Effort: Pulmonary effort is normal.      Breath sounds: Normal breath sounds.   Abdominal:      General: Bowel sounds are normal.      Palpations: Abdomen is soft.   Musculoskeletal:         General: Normal range of motion.      Cervical back: Normal range of motion.   Skin:     General: Skin is warm and dry.      Capillary Refill: Capillary refill takes less than 2 seconds.   Neurological:      General: No focal deficit present.      Mental Status: He is alert and oriented to person, place, and time.   Psychiatric:         Attention and Perception: Attention normal.         Mood and Affect: Affect normal.         Speech: Speech normal.         Behavior: Behavior normal.         Thought Content: Thought content normal.       Assessment:  1. 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 Erythropoietin level is not suppressed; this is a point against polycythemia vera. Bone marrow examination does not suggest polycythemia vera. Calretinin mutation negative in bone marrow. JANESSA-2 mutation negative (both JANESSA-2 V617F, as well as exons 12-15 are negative)  -->  Polycythemia vera ruled out   (erythropoietin level not suppressed; JANESSA mutation negative)  Patient has some kind of secondary erythrocytosis   -Phlebotomized: 11/20/2017; 12/11/2017; 01/02/2018; 03/12/2019 (H/H 18.6/51.4); 07/15/2019 (H/H 18.0/52.2); 10/14/2019 (H/H 18.1/53.6)  -H/H: 16.2/48.8 (12/09/2019); 16.2/48.8 (11/11/2019; 16.0/48.1 (10/21/2019); 18.1/53.6 (10/08/2019), etc.  -After last therapeutic phlebotomy on 10/14/2019 (H/H 18.1/23.6), his H/H has remained stable, 16.6/49.9 as of 02/10/2020  -Therapeutic phlebotomy 605 cc on 04/21/2020 (H/H 17.7/52.2)  -07/06/2020: H/H 17.4/51.6  -Therapeutic phlebotomy 07/09/2020 (H/H 17.4/51.6), 07/20/2020 (H/H 15.3/45.3), 07/27/2020 (H/H 14.8/45.2)  -08/03/2020: H/H 14.0/42.2 (phlebotomy held)  -08/11/2020: H/H 13.9/41.4 (phlebotomy held)  -09/14/2020: CBC reviewed. H/H 14.6/44.1.  -Since 09/15/2020: Therapeutic phlebotomy: 12/14/2020, 04/19/2021, 08/16/2021, 11/22/2021, 03/21/2022, 04/18/2022        -11/28/2022:  H/H 13.9/45.0   -S/P therapeutic phlebotomy 11/28/2022, per patient preference (534 mL phlebotomized)  -01/03/2023:  H/H 13.1/43.7  -01/30/2023:  H/H 13.0/41.5  -04/24/2023:  Hemoglobin 13.7.  Hematocrit 42.7.  -07/31/2023:  Hemoglobin 15.1.  Hematocrit 47.6; phlebotomized 505 cc  -09/11/2023:  Hemoglobin 13.8.  Hematocrit 45.1.  -11/20/2023:  Hemoglobin 14.9.  Hematocrit 46.3  -therapeutic phlebotomy performed 11/20/2023 (520 cc blood)  -01/22/2024:  CBC reviewed; hemoglobin 13.7, hematocrit 41.9   -11/20/2023:  H/H 14.9/46.3  -03/25/2024:  WBC 4.26,  hemoglobin 14.2, hematocrit 41.8, platelets 241 K; CMP reviewed, AST 67  6/3/2024: H/H 16.2,47.6 therapeutic phlebotomy     Plan:  Secondary erythrocytosis  -09/15/2020: No indication of phlebotomy at this time  -Recheck CBC in 1 month to assess the need of therapeutic phlebotomy, then follow-up visit    -No evidence of polycythemia vera  -Has secondary polycythemia  -No symptoms of hyperviscosity state  -However, has behaved more or less like polycythemia vera (persistently elevated H/H), requiring periodic therapeutic phlebotomy treatments    -Therapeutic phlebotomy as needed  -Target hematocrit <45  -Hold phlebotomy if hematocrit <45  -Continue baby aspirin 81 mg p.o. daily    Hct 41.0 today in clinic   HOLD therapeutic phleb today in infusion due to HCT being 41.0  RTC with me in  1 month (5/12) with labs prior (CBC/CMP) followed by possible therapeutic phleb  Therapeutic phlebotomy to keep Hct <45%.  Continue ASA 81mg PO daily.    Elevated Liver Enzymes:   Denies any abdominal pain  Reports drinking alcohol socially   Educated patient on the use of limiting the amount of alcohol he consumes and the use of hydration.  Will continue to monitor       Discussion:  He has secondary erythrocytosis of unclear etiology.   No evidence of polycythemia vera (normal erythropoietin level and negative JANESSA-2 mutation rule out polycythemia vera).  However, in terms of persistence of elevated H/H, has behaved more or less like polycythemia vera.    Had no symptoms of hyperviscosity state at any time despite severe elevation of hemoglobin.    Regardless, to be on the safer side, will continue therapeutic phlebotomy as needed.      No follow-ups on file.

## 2025-05-06 DIAGNOSIS — D75.1 SECONDARY ERYTHROCYTOSIS: Primary | ICD-10-CM

## 2025-05-09 ENCOUNTER — TELEPHONE (OUTPATIENT)
Dept: HEMATOLOGY/ONCOLOGY | Facility: CLINIC | Age: 45
End: 2025-05-09
Payer: COMMERCIAL

## 2025-05-09 NOTE — TELEPHONE ENCOUNTER
Called patient to confirm appointment for 5/12/25. Patient did not answer the phone and was unable to leave a voice message. Called patient son Sukhi Youssef. Patient son did not answer the phone and was unable to leave a voice message.

## 2025-05-12 ENCOUNTER — OFFICE VISIT (OUTPATIENT)
Dept: HEMATOLOGY/ONCOLOGY | Facility: CLINIC | Age: 45
End: 2025-05-12
Payer: COMMERCIAL

## 2025-05-12 VITALS
SYSTOLIC BLOOD PRESSURE: 137 MMHG | TEMPERATURE: 99 F | RESPIRATION RATE: 16 BRPM | BODY MASS INDEX: 27.19 KG/M2 | OXYGEN SATURATION: 100 % | DIASTOLIC BLOOD PRESSURE: 87 MMHG | WEIGHT: 163.19 LBS | HEIGHT: 65 IN | HEART RATE: 96 BPM

## 2025-05-12 DIAGNOSIS — D75.1 SECONDARY ERYTHROCYTOSIS: Primary | ICD-10-CM

## 2025-05-12 DIAGNOSIS — R74.8 ELEVATED LIVER ENZYMES: ICD-10-CM

## 2025-05-12 PROCEDURE — 3075F SYST BP GE 130 - 139MM HG: CPT | Mod: CPTII,,,

## 2025-05-12 PROCEDURE — 1160F RVW MEDS BY RX/DR IN RCRD: CPT | Mod: CPTII,,,

## 2025-05-12 PROCEDURE — 3079F DIAST BP 80-89 MM HG: CPT | Mod: CPTII,,,

## 2025-05-12 PROCEDURE — 3008F BODY MASS INDEX DOCD: CPT | Mod: CPTII,,,

## 2025-05-12 PROCEDURE — 99214 OFFICE O/P EST MOD 30 MIN: CPT | Mod: PBBFAC

## 2025-05-12 PROCEDURE — 99213 OFFICE O/P EST LOW 20 MIN: CPT | Mod: S$PBB,,,

## 2025-05-12 PROCEDURE — 1159F MED LIST DOCD IN RCRD: CPT | Mod: CPTII,,,

## 2025-05-12 PROCEDURE — 4010F ACE/ARB THERAPY RXD/TAKEN: CPT | Mod: CPTII,,,

## 2025-05-12 NOTE — PROGRESS NOTES
Problem List:  Severe erythrocytosis    Current Treatment:  ASA 81mg po daily.  Therapeutic phlebotomy to keep Hct <45%    History of Present Illness:  The patient is a pleasant 36-year-old -American man. For a full, detailed history, please see the note dated 8/9/2021.    Interval History 5/12/25:   Patient presents to the clinic alone for ongoing management of erythrocytosis.He denies any significant fatigue, abnormal bleeding, unusual headaches, dizziness.  He reports feeling well overall. Lab work reviewed with patient. All future appointments were discussed with the patient.     Review of Systems   All other systems reviewed and are negative.     Physical Exam  Constitutional:       Appearance: Normal appearance.   HENT:      Head: Normocephalic.      Mouth/Throat:      Mouth: Mucous membranes are moist.   Eyes:      Pupils: Pupils are equal, round, and reactive to light.   Cardiovascular:      Rate and Rhythm: Normal rate and regular rhythm.      Pulses: Normal pulses.      Heart sounds: Normal heart sounds.   Pulmonary:      Effort: Pulmonary effort is normal.      Breath sounds: Normal breath sounds.   Abdominal:      General: Bowel sounds are normal.      Palpations: Abdomen is soft.   Musculoskeletal:         General: Normal range of motion.      Cervical back: Normal range of motion.   Skin:     General: Skin is warm and dry.      Capillary Refill: Capillary refill takes less than 2 seconds.   Neurological:      General: No focal deficit present.      Mental Status: He is alert and oriented to person, place, and time.   Psychiatric:         Attention and Perception: Attention normal.         Mood and Affect: Affect normal.         Speech: Speech normal.         Behavior: Behavior normal.         Thought Content: Thought content normal.       Assessment:  1. 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 Erythropoietin level is not suppressed; this is a point against polycythemia vera. Bone marrow examination does not suggest polycythemia vera. Calretinin mutation negative in bone marrow. JANESSA-2 mutation negative (both JANESSA-2 V617F, as well as exons 12-15 are negative)  -->  Polycythemia vera ruled out   (erythropoietin level not suppressed; JANESSA mutation negative)  Patient has some kind of secondary erythrocytosis   -Phlebotomized: 11/20/2017; 12/11/2017; 01/02/2018; 03/12/2019 (H/H 18.6/51.4); 07/15/2019 (H/H 18.0/52.2); 10/14/2019 (H/H 18.1/53.6)  -H/H: 16.2/48.8 (12/09/2019); 16.2/48.8 (11/11/2019; 16.0/48.1 (10/21/2019); 18.1/53.6 (10/08/2019), etc.  -After last therapeutic phlebotomy on 10/14/2019 (H/H 18.1/23.6), his H/H has remained stable, 16.6/49.9 as of 02/10/2020  -Therapeutic phlebotomy 605 cc on 04/21/2020 (H/H 17.7/52.2)  -07/06/2020: H/H 17.4/51.6  -Therapeutic phlebotomy 07/09/2020 (H/H 17.4/51.6), 07/20/2020 (H/H 15.3/45.3), 07/27/2020 (H/H 14.8/45.2)  -08/03/2020: H/H 14.0/42.2 (phlebotomy held)  -08/11/2020: H/H 13.9/41.4 (phlebotomy held)  -09/14/2020: CBC reviewed. H/H 14.6/44.1.  -Since 09/15/2020: Therapeutic phlebotomy: 12/14/2020, 04/19/2021, 08/16/2021, 11/22/2021, 03/21/2022, 04/18/2022        -11/28/2022:  H/H 13.9/45.0   -S/P therapeutic phlebotomy 11/28/2022, per patient preference (534 mL phlebotomized)  -01/03/2023:  H/H 13.1/43.7  -01/30/2023:  H/H 13.0/41.5  -04/24/2023:  Hemoglobin 13.7.  Hematocrit 42.7.  -07/31/2023:  Hemoglobin 15.1.  Hematocrit 47.6; phlebotomized 505 cc  -09/11/2023:  Hemoglobin 13.8.  Hematocrit 45.1.  -11/20/2023:  Hemoglobin 14.9.  Hematocrit 46.3  -therapeutic phlebotomy performed 11/20/2023 (520 cc blood)  -01/22/2024:  CBC reviewed; hemoglobin 13.7, hematocrit 41.9   -11/20/2023:  H/H 14.9/46.3  -03/25/2024:  WBC 4.26, hemoglobin 14.2, hematocrit 41.8, platelets 241 K; CMP  reviewed, AST 67  6/3/2024: H/H 16.2,47.6 therapeutic phlebotomy     Plan:  Secondary erythrocytosis  -09/15/2020: No indication of phlebotomy at this time  -Recheck CBC in 1 month to assess the need of therapeutic phlebotomy, then follow-up visit    -No evidence of polycythemia vera  -Has secondary polycythemia  -No symptoms of hyperviscosity state  -However, has behaved more or less like polycythemia vera (persistently elevated H/H), requiring periodic therapeutic phlebotomy treatments    -Therapeutic phlebotomy as needed  -Target hematocrit <45  -Hold phlebotomy if hematocrit <45  -Continue baby aspirin 81 mg p.o. daily    Hct 43.2 today in clinic   HOLD therapeutic phleb today in infusion due to HCT being 43.2  RTC with me in  1 month (6/12) with labs prior (CBC/CMP) followed by possible therapeutic phleb  Therapeutic phlebotomy to keep Hct <45%.  Continue ASA 81mg PO daily.    Elevated Liver Enzymes:   Liver enzymes continue to trend up from previous month  Denies any abdominal pain  Reports drinking alcohol socially   Educated patient on the use of limiting the amount of alcohol he consumes and the use of hydration.  Will continue to monitor       Discussion:  He has secondary erythrocytosis of unclear etiology.   No evidence of polycythemia vera (normal erythropoietin level and negative JANESSA-2 mutation rule out polycythemia vera).  However, in terms of persistence of elevated H/H, has behaved more or less like polycythemia vera.    Had no symptoms of hyperviscosity state at any time despite severe elevation of hemoglobin.    Regardless, to be on the safer side, will continue therapeutic phlebotomy as needed.      No follow-ups on file.

## 2025-05-29 DIAGNOSIS — I10 PRIMARY HYPERTENSION: ICD-10-CM

## 2025-06-02 ENCOUNTER — OFFICE VISIT (OUTPATIENT)
Dept: INTERNAL MEDICINE | Facility: CLINIC | Age: 45
End: 2025-06-02
Payer: COMMERCIAL

## 2025-06-02 VITALS
RESPIRATION RATE: 16 BRPM | TEMPERATURE: 98 F | HEART RATE: 84 BPM | HEIGHT: 65 IN | SYSTOLIC BLOOD PRESSURE: 140 MMHG | WEIGHT: 160.88 LBS | BODY MASS INDEX: 26.8 KG/M2 | OXYGEN SATURATION: 98 % | DIASTOLIC BLOOD PRESSURE: 90 MMHG

## 2025-06-02 DIAGNOSIS — M05.9 SEROPOSITIVE RHEUMATOID ARTHRITIS: ICD-10-CM

## 2025-06-02 DIAGNOSIS — I10 PRIMARY HYPERTENSION: ICD-10-CM

## 2025-06-02 DIAGNOSIS — F51.01 PRIMARY INSOMNIA: ICD-10-CM

## 2025-06-02 DIAGNOSIS — E55.9 VITAMIN D DEFICIENCY: ICD-10-CM

## 2025-06-02 DIAGNOSIS — D75.1 SECONDARY ERYTHROCYTOSIS: ICD-10-CM

## 2025-06-02 DIAGNOSIS — K21.9 GASTROESOPHAGEAL REFLUX DISEASE WITHOUT ESOPHAGITIS: ICD-10-CM

## 2025-06-02 DIAGNOSIS — F41.1 GAD (GENERALIZED ANXIETY DISORDER): ICD-10-CM

## 2025-06-02 DIAGNOSIS — Z76.89 ENCOUNTER TO ESTABLISH CARE: Primary | ICD-10-CM

## 2025-06-02 DIAGNOSIS — F17.200 VAPING NICOTINE DEPENDENCE, NON-TOBACCO PRODUCT: ICD-10-CM

## 2025-06-02 DIAGNOSIS — Z13.1 SCREENING FOR DIABETES MELLITUS: ICD-10-CM

## 2025-06-02 DIAGNOSIS — R10.32 LEFT LOWER QUADRANT ABDOMINAL PAIN: ICD-10-CM

## 2025-06-02 PROCEDURE — 3077F SYST BP >= 140 MM HG: CPT | Mod: CPTII,,,

## 2025-06-02 PROCEDURE — 3080F DIAST BP >= 90 MM HG: CPT | Mod: CPTII,,,

## 2025-06-02 PROCEDURE — 99214 OFFICE O/P EST MOD 30 MIN: CPT | Mod: S$PBB,,,

## 2025-06-02 PROCEDURE — 1160F RVW MEDS BY RX/DR IN RCRD: CPT | Mod: CPTII,,,

## 2025-06-02 PROCEDURE — 4010F ACE/ARB THERAPY RXD/TAKEN: CPT | Mod: CPTII,,,

## 2025-06-02 PROCEDURE — 99215 OFFICE O/P EST HI 40 MIN: CPT | Mod: PBBFAC

## 2025-06-02 PROCEDURE — 1159F MED LIST DOCD IN RCRD: CPT | Mod: CPTII,,,

## 2025-06-02 PROCEDURE — 3008F BODY MASS INDEX DOCD: CPT | Mod: CPTII,,,

## 2025-06-02 RX ORDER — SULFASALAZINE 500 MG/1
TABLET ORAL
COMMUNITY
Start: 2025-04-14

## 2025-06-02 RX ORDER — NIFEDIPINE 60 MG/1
60 TABLET, EXTENDED RELEASE ORAL
Qty: 90 TABLET | Refills: 3 | OUTPATIENT
Start: 2025-06-02

## 2025-06-02 RX ORDER — OMEPRAZOLE 40 MG/1
40 CAPSULE, DELAYED RELEASE ORAL DAILY
Qty: 30 CAPSULE | Refills: 11 | Status: SHIPPED | OUTPATIENT
Start: 2025-06-02 | End: 2026-06-02

## 2025-06-02 RX ORDER — HYDROXYZINE PAMOATE 25 MG/1
25 CAPSULE ORAL 2 TIMES DAILY PRN
Qty: 30 CAPSULE | Refills: 11 | Status: SHIPPED | OUTPATIENT
Start: 2025-06-02

## 2025-06-02 RX ORDER — SIMETHICONE 125 MG
125 CAPSULE ORAL 4 TIMES DAILY PRN
Qty: 30 EACH | Refills: 1 | Status: SHIPPED | OUTPATIENT
Start: 2025-06-02

## 2025-06-02 RX ORDER — METHOCARBAMOL 750 MG/1
750 TABLET, FILM COATED ORAL EVERY 6 HOURS PRN
COMMUNITY
Start: 2025-05-13

## 2025-06-02 RX ORDER — LOSARTAN POTASSIUM 100 MG/1
100 TABLET ORAL DAILY
Qty: 90 TABLET | Refills: 3 | Status: SHIPPED | OUTPATIENT
Start: 2025-06-02

## 2025-06-02 RX ORDER — IBUPROFEN 600 MG/1
600 TABLET, FILM COATED ORAL EVERY 8 HOURS PRN
COMMUNITY
Start: 2025-05-13

## 2025-06-02 RX ORDER — NIFEDIPINE 90 MG/1
90 TABLET, EXTENDED RELEASE ORAL DAILY
Qty: 30 TABLET | Refills: 5 | Status: SHIPPED | OUTPATIENT
Start: 2025-06-02 | End: 2026-06-02

## 2025-06-02 RX ORDER — ZOLPIDEM TARTRATE 10 MG/1
10 TABLET ORAL NIGHTLY
Qty: 30 TABLET | Refills: 5 | Status: SHIPPED | OUTPATIENT
Start: 2025-06-02 | End: 2025-11-29

## 2025-06-02 RX ORDER — ASPIRIN 81 MG/1
81 TABLET ORAL DAILY
Qty: 30 TABLET | Refills: 11 | Status: SHIPPED | OUTPATIENT
Start: 2025-06-02 | End: 2026-06-02

## 2025-06-02 RX ORDER — MELOXICAM 15 MG/1
15 TABLET ORAL DAILY
COMMUNITY
Start: 2025-04-14

## 2025-06-09 ENCOUNTER — LAB VISIT (OUTPATIENT)
Dept: HEMATOLOGY/ONCOLOGY | Facility: CLINIC | Age: 45
End: 2025-06-09
Payer: COMMERCIAL

## 2025-06-09 ENCOUNTER — INFUSION (OUTPATIENT)
Dept: INFUSION THERAPY | Facility: HOSPITAL | Age: 45
End: 2025-06-09
Attending: INTERNAL MEDICINE
Payer: COMMERCIAL

## 2025-06-09 ENCOUNTER — OFFICE VISIT (OUTPATIENT)
Dept: HEMATOLOGY/ONCOLOGY | Facility: CLINIC | Age: 45
End: 2025-06-09
Payer: COMMERCIAL

## 2025-06-09 VITALS
OXYGEN SATURATION: 100 % | HEIGHT: 65 IN | DIASTOLIC BLOOD PRESSURE: 91 MMHG | TEMPERATURE: 99 F | RESPIRATION RATE: 17 BRPM | SYSTOLIC BLOOD PRESSURE: 135 MMHG | WEIGHT: 162.81 LBS | BODY MASS INDEX: 27.13 KG/M2 | HEART RATE: 79 BPM

## 2025-06-09 DIAGNOSIS — F10.10 ALCOHOL ABUSE: ICD-10-CM

## 2025-06-09 DIAGNOSIS — D75.1 SECONDARY ERYTHROCYTOSIS: Primary | ICD-10-CM

## 2025-06-09 DIAGNOSIS — R74.8 ELEVATED LIVER ENZYMES: ICD-10-CM

## 2025-06-09 DIAGNOSIS — D75.1 SECONDARY ERYTHROCYTOSIS: ICD-10-CM

## 2025-06-09 LAB
ALBUMIN SERPL-MCNC: 3.5 G/DL (ref 3.5–5)
ALBUMIN/GLOB SERPL: 0.9 RATIO (ref 1.1–2)
ALP SERPL-CCNC: 98 UNIT/L (ref 40–150)
ALT SERPL-CCNC: 45 UNIT/L (ref 0–55)
ANION GAP SERPL CALC-SCNC: 10 MEQ/L
AST SERPL-CCNC: 62 UNIT/L (ref 11–45)
BASOPHILS # BLD AUTO: 0.05 X10(3)/MCL
BASOPHILS NFR BLD AUTO: 1.1 %
BILIRUB SERPL-MCNC: 0.2 MG/DL
BUN SERPL-MCNC: 6.1 MG/DL (ref 8.9–20.6)
CALCIUM SERPL-MCNC: 8.3 MG/DL (ref 8.4–10.2)
CHLORIDE SERPL-SCNC: 110 MMOL/L (ref 98–107)
CO2 SERPL-SCNC: 23 MMOL/L (ref 22–29)
CREAT SERPL-MCNC: 0.8 MG/DL (ref 0.72–1.25)
CREAT/UREA NIT SERPL: 8
EOSINOPHIL # BLD AUTO: 0.03 X10(3)/MCL (ref 0–0.9)
EOSINOPHIL NFR BLD AUTO: 0.7 %
ERYTHROCYTE [DISTWIDTH] IN BLOOD BY AUTOMATED COUNT: 17.7 % (ref 11.5–17)
GFR SERPLBLD CREATININE-BSD FMLA CKD-EPI: >60 ML/MIN/1.73/M2
GLOBULIN SER-MCNC: 4 GM/DL (ref 2.4–3.5)
GLUCOSE SERPL-MCNC: 97 MG/DL (ref 74–100)
HCT VFR BLD AUTO: 43.9 % (ref 42–52)
HGB BLD-MCNC: 14.1 G/DL (ref 14–18)
IMM GRANULOCYTES # BLD AUTO: 0.01 X10(3)/MCL (ref 0–0.04)
IMM GRANULOCYTES NFR BLD AUTO: 0.2 %
LYMPHOCYTES # BLD AUTO: 1.43 X10(3)/MCL (ref 0.6–4.6)
LYMPHOCYTES NFR BLD AUTO: 32.1 %
MCH RBC QN AUTO: 26.9 PG (ref 27–31)
MCHC RBC AUTO-ENTMCNC: 32.1 G/DL (ref 33–36)
MCV RBC AUTO: 83.6 FL (ref 80–94)
MONOCYTES # BLD AUTO: 0.44 X10(3)/MCL (ref 0.1–1.3)
MONOCYTES NFR BLD AUTO: 9.9 %
NEUTROPHILS # BLD AUTO: 2.49 X10(3)/MCL (ref 2.1–9.2)
NEUTROPHILS NFR BLD AUTO: 56 %
NRBC BLD AUTO-RTO: 0 %
PLATELET # BLD AUTO: 232 X10(3)/MCL (ref 130–400)
PMV BLD AUTO: 9.9 FL (ref 7.4–10.4)
POTASSIUM SERPL-SCNC: 3.2 MMOL/L (ref 3.5–5.1)
PROT SERPL-MCNC: 7.5 GM/DL (ref 6.4–8.3)
RBC # BLD AUTO: 5.25 X10(6)/MCL (ref 4.7–6.1)
SODIUM SERPL-SCNC: 143 MMOL/L (ref 136–145)
WBC # BLD AUTO: 4.45 X10(3)/MCL (ref 4.5–11.5)

## 2025-06-09 PROCEDURE — 3008F BODY MASS INDEX DOCD: CPT | Mod: CPTII,,,

## 2025-06-09 PROCEDURE — 3080F DIAST BP >= 90 MM HG: CPT | Mod: CPTII,,,

## 2025-06-09 PROCEDURE — 1160F RVW MEDS BY RX/DR IN RCRD: CPT | Mod: CPTII,,,

## 2025-06-09 PROCEDURE — 1159F MED LIST DOCD IN RCRD: CPT | Mod: CPTII,,,

## 2025-06-09 PROCEDURE — 99214 OFFICE O/P EST MOD 30 MIN: CPT | Mod: S$PBB,,,

## 2025-06-09 PROCEDURE — 99214 OFFICE O/P EST MOD 30 MIN: CPT | Mod: PBBFAC

## 2025-06-09 PROCEDURE — 3075F SYST BP GE 130 - 139MM HG: CPT | Mod: CPTII,,,

## 2025-06-09 PROCEDURE — 80053 COMPREHEN METABOLIC PANEL: CPT

## 2025-06-09 PROCEDURE — 85025 COMPLETE CBC W/AUTO DIFF WBC: CPT

## 2025-06-09 PROCEDURE — 4010F ACE/ARB THERAPY RXD/TAKEN: CPT | Mod: CPTII,,,

## 2025-06-09 NOTE — PROGRESS NOTES
Problem List:  Severe erythrocytosis    Current Treatment:  ASA 81mg po daily.  Therapeutic phlebotomy to keep Hct <45%    History of Present Illness:  The patient is a pleasant 36-year-old -American man. For a full, detailed history, please see the note dated 8/9/2021.    Interval History 6/9/25:   Patient presents to the clinic alone for ongoing management of erythrocytosis.He denies any significant fatigue, abnormal bleeding, unusual headaches, dizziness.  He reports feeling well overall. He reports continued drinking. Lab work reviewed with patient. All future appointments were discussed with the patient.     Review of Systems   All other systems reviewed and are negative.     Physical Exam  Constitutional:       Appearance: Normal appearance.   HENT:      Head: Normocephalic.   Cardiovascular:      Rate and Rhythm: Regular rhythm.      Pulses: Normal pulses.      Heart sounds: Normal heart sounds.   Pulmonary:      Effort: Pulmonary effort is normal.      Breath sounds: Normal breath sounds.   Abdominal:      General: Bowel sounds are normal.      Palpations: Abdomen is soft.   Skin:     General: Skin is warm.      Capillary Refill: Capillary refill takes less than 2 seconds.   Neurological:      Mental Status: He is alert and oriented to person, place, and time.   Psychiatric:         Mood and Affect: Mood normal.         Behavior: Behavior normal.         Thought Content: Thought content normal.         Judgment: Judgment normal.          Assessment:  1. 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 Erythropoietin level is not suppressed; this is a point against polycythemia vera. Bone marrow examination does not suggest polycythemia vera. Calretinin mutation negative in bone marrow. JANESSA-2 mutation negative (both JANESSA-2 V617F, as  well as exons 12-15 are negative)  -->  Polycythemia vera ruled out   (erythropoietin level not suppressed; JANESSA mutation negative)  Patient has some kind of secondary erythrocytosis   -Phlebotomized: 11/20/2017; 12/11/2017; 01/02/2018; 03/12/2019 (H/H 18.6/51.4); 07/15/2019 (H/H 18.0/52.2); 10/14/2019 (H/H 18.1/53.6)  -H/H: 16.2/48.8 (12/09/2019); 16.2/48.8 (11/11/2019; 16.0/48.1 (10/21/2019); 18.1/53.6 (10/08/2019), etc.  -After last therapeutic phlebotomy on 10/14/2019 (H/H 18.1/23.6), his H/H has remained stable, 16.6/49.9 as of 02/10/2020  -Therapeutic phlebotomy 605 cc on 04/21/2020 (H/H 17.7/52.2)  -07/06/2020: H/H 17.4/51.6  -Therapeutic phlebotomy 07/09/2020 (H/H 17.4/51.6), 07/20/2020 (H/H 15.3/45.3), 07/27/2020 (H/H 14.8/45.2)  -08/03/2020: H/H 14.0/42.2 (phlebotomy held)  -08/11/2020: H/H 13.9/41.4 (phlebotomy held)  -09/14/2020: CBC reviewed. H/H 14.6/44.1.  -Since 09/15/2020: Therapeutic phlebotomy: 12/14/2020, 04/19/2021, 08/16/2021, 11/22/2021, 03/21/2022, 04/18/2022        -11/28/2022:  H/H 13.9/45.0   -S/P therapeutic phlebotomy 11/28/2022, per patient preference (534 mL phlebotomized)  -01/03/2023:  H/H 13.1/43.7  -01/30/2023:  H/H 13.0/41.5  -04/24/2023:  Hemoglobin 13.7.  Hematocrit 42.7.  -07/31/2023:  Hemoglobin 15.1.  Hematocrit 47.6; phlebotomized 505 cc  -09/11/2023:  Hemoglobin 13.8.  Hematocrit 45.1.  -11/20/2023:  Hemoglobin 14.9.  Hematocrit 46.3  -therapeutic phlebotomy performed 11/20/2023 (520 cc blood)  -01/22/2024:  CBC reviewed; hemoglobin 13.7, hematocrit 41.9   -11/20/2023:  H/H 14.9/46.3  -03/25/2024:  WBC 4.26, hemoglobin 14.2, hematocrit 41.8, platelets 241 K; CMP reviewed, AST 67  6/3/2024: H/H 16.2,47.6 therapeutic phlebotomy     Plan:  Secondary erythrocytosis  -09/15/2020: No indication of phlebotomy at this time  -Recheck CBC in 1 month to assess the need of therapeutic phlebotomy, then follow-up visit    -No evidence of polycythemia vera  -Has secondary polycythemia  -No  symptoms of hyperviscosity state  -However, has behaved more or less like polycythemia vera (persistently elevated H/H), requiring periodic therapeutic phlebotomy treatments    -Therapeutic phlebotomy as needed  -Target hematocrit <45  -Hold phlebotomy if hematocrit <45  -Continue baby aspirin 81 mg p.o. daily    Hct 41.0 today in clinic   HOLD therapeutic phleb today in infusion due to HCT being 41.0  RTC with me in  2 month (8/12) with labs prior (CBC/CMP) followed by possible therapeutic phleb  Therapeutic phlebotomy to keep Hct <45%.  Continue ASA 81mg PO daily.    Elevated Liver Enzymes:   Liver enzymes trended down this month   Denies any abdominal pain  Reports drinking alcohol   Will continue to monitor       Discussion:  He has secondary erythrocytosis of unclear etiology.   No evidence of polycythemia vera (normal erythropoietin level and negative JANESSA-2 mutation rule out polycythemia vera).  However, in terms of persistence of elevated H/H, has behaved more or less like polycythemia vera.    Had no symptoms of hyperviscosity state at any time despite severe elevation of hemoglobin.    Regardless, to be on the safer side, will continue therapeutic phlebotomy as needed.      No follow-ups on file.

## 2025-06-09 NOTE — NURSING
Pt did labs and saw A Mario NP.  Hct 43.9 today so phlebotomy being held since it is below the parameter of 45.  Pt to return in 2 mon for reeval.

## 2025-08-01 DIAGNOSIS — M05.9 SEROPOSITIVE RHEUMATOID ARTHRITIS: Primary | ICD-10-CM

## 2025-08-11 ENCOUNTER — OFFICE VISIT (OUTPATIENT)
Dept: HEMATOLOGY/ONCOLOGY | Facility: CLINIC | Age: 45
End: 2025-08-11
Payer: COMMERCIAL

## 2025-08-11 ENCOUNTER — INFUSION (OUTPATIENT)
Dept: INFUSION THERAPY | Facility: HOSPITAL | Age: 45
End: 2025-08-11
Attending: INTERNAL MEDICINE
Payer: COMMERCIAL

## 2025-08-11 VITALS
WEIGHT: 162.38 LBS | HEART RATE: 89 BPM | OXYGEN SATURATION: 100 % | TEMPERATURE: 99 F | DIASTOLIC BLOOD PRESSURE: 87 MMHG | RESPIRATION RATE: 16 BRPM | BODY MASS INDEX: 27.05 KG/M2 | HEIGHT: 65 IN | SYSTOLIC BLOOD PRESSURE: 123 MMHG

## 2025-08-11 DIAGNOSIS — R74.8 ELEVATED LIVER ENZYMES: ICD-10-CM

## 2025-08-11 DIAGNOSIS — D75.1 SECONDARY ERYTHROCYTOSIS: Primary | ICD-10-CM

## 2025-08-11 DIAGNOSIS — F10.10 ALCOHOL ABUSE: ICD-10-CM

## 2025-08-11 PROCEDURE — 99214 OFFICE O/P EST MOD 30 MIN: CPT | Mod: PBBFAC,25

## 2025-08-11 PROCEDURE — 99195 PHLEBOTOMY: CPT

## 2025-08-11 PROCEDURE — 3079F DIAST BP 80-89 MM HG: CPT | Mod: CPTII,,,

## 2025-08-11 PROCEDURE — 4010F ACE/ARB THERAPY RXD/TAKEN: CPT | Mod: CPTII,,,

## 2025-08-11 PROCEDURE — 3074F SYST BP LT 130 MM HG: CPT | Mod: CPTII,,,

## 2025-08-11 PROCEDURE — 3008F BODY MASS INDEX DOCD: CPT | Mod: CPTII,,,

## 2025-08-11 PROCEDURE — 99213 OFFICE O/P EST LOW 20 MIN: CPT | Mod: S$PBB,,,

## 2025-08-11 RX ORDER — LIDOCAINE HYDROCHLORIDE 10 MG/ML
1 INJECTION, SOLUTION EPIDURAL; INFILTRATION; INTRACAUDAL; PERINEURAL ONCE
OUTPATIENT
Start: 2025-08-18 | End: 2025-08-18

## 2025-08-11 RX ORDER — LIDOCAINE HYDROCHLORIDE 10 MG/ML
1 INJECTION, SOLUTION EPIDURAL; INFILTRATION; INTRACAUDAL; PERINEURAL ONCE
Status: DISCONTINUED | OUTPATIENT
Start: 2025-08-11 | End: 2025-08-11 | Stop reason: HOSPADM

## 2025-08-18 ENCOUNTER — OFFICE VISIT (OUTPATIENT)
Dept: INTERNAL MEDICINE | Facility: CLINIC | Age: 45
End: 2025-08-18
Payer: COMMERCIAL

## 2025-08-18 ENCOUNTER — DOCUMENTATION ONLY (OUTPATIENT)
Dept: INFUSION THERAPY | Facility: HOSPITAL | Age: 45
End: 2025-08-18
Payer: COMMERCIAL

## 2025-08-18 ENCOUNTER — LAB VISIT (OUTPATIENT)
Dept: LAB | Facility: HOSPITAL | Age: 45
End: 2025-08-18
Payer: COMMERCIAL

## 2025-08-18 VITALS
WEIGHT: 161.63 LBS | BODY MASS INDEX: 26.93 KG/M2 | RESPIRATION RATE: 16 BRPM | DIASTOLIC BLOOD PRESSURE: 86 MMHG | HEIGHT: 65 IN | HEART RATE: 97 BPM | TEMPERATURE: 98 F | SYSTOLIC BLOOD PRESSURE: 135 MMHG | OXYGEN SATURATION: 100 %

## 2025-08-18 DIAGNOSIS — D75.1 SECONDARY ERYTHROCYTOSIS: Primary | ICD-10-CM

## 2025-08-18 DIAGNOSIS — F41.1 GAD (GENERALIZED ANXIETY DISORDER): ICD-10-CM

## 2025-08-18 DIAGNOSIS — I10 PRIMARY HYPERTENSION: ICD-10-CM

## 2025-08-18 DIAGNOSIS — M54.9 MID BACK PAIN ON LEFT SIDE: ICD-10-CM

## 2025-08-18 DIAGNOSIS — F17.200 VAPING NICOTINE DEPENDENCE, NON-TOBACCO PRODUCT: ICD-10-CM

## 2025-08-18 DIAGNOSIS — Z00.00 WELL ADULT EXAM: Primary | ICD-10-CM

## 2025-08-18 DIAGNOSIS — E55.9 VITAMIN D DEFICIENCY: ICD-10-CM

## 2025-08-18 DIAGNOSIS — R74.8 ELEVATED LIVER ENZYMES: ICD-10-CM

## 2025-08-18 DIAGNOSIS — Z13.1 SCREENING FOR DIABETES MELLITUS: ICD-10-CM

## 2025-08-18 LAB
25(OH)D3+25(OH)D2 SERPL-MCNC: 21 NG/ML (ref 30–80)
ALBUMIN SERPL-MCNC: 3.6 G/DL (ref 3.5–5)
ALBUMIN/CREAT UR: 4.3 MG/GM CR (ref 0–30)
ALBUMIN/GLOB SERPL: 0.8 RATIO (ref 1.1–2)
ALP SERPL-CCNC: 111 UNIT/L (ref 40–150)
ALT SERPL-CCNC: 54 UNIT/L (ref 0–55)
ANION GAP SERPL CALC-SCNC: 11 MEQ/L
AST SERPL-CCNC: 73 UNIT/L (ref 11–45)
BACTERIA #/AREA URNS AUTO: ABNORMAL /HPF
BASOPHILS # BLD AUTO: 0.05 X10(3)/MCL
BASOPHILS NFR BLD AUTO: 1 %
BILIRUB SERPL-MCNC: 0.4 MG/DL
BILIRUB UR QL STRIP.AUTO: NEGATIVE
BUN SERPL-MCNC: 6.6 MG/DL (ref 8.9–20.6)
CALCIUM SERPL-MCNC: 9 MG/DL (ref 8.4–10.2)
CHLORIDE SERPL-SCNC: 106 MMOL/L (ref 98–107)
CHOLEST SERPL-MCNC: 114 MG/DL
CHOLEST/HDLC SERPL: 4 {RATIO} (ref 0–5)
CLARITY UR: CLEAR
CO2 SERPL-SCNC: 23 MMOL/L (ref 22–29)
COLOR UR AUTO: YELLOW
CREAT SERPL-MCNC: 0.86 MG/DL (ref 0.72–1.25)
CREAT UR-MCNC: 255.6 MG/DL (ref 63–166)
CREAT/UREA NIT SERPL: 8
EOSINOPHIL # BLD AUTO: 0.03 X10(3)/MCL (ref 0–0.9)
EOSINOPHIL NFR BLD AUTO: 0.6 %
ERYTHROCYTE [DISTWIDTH] IN BLOOD BY AUTOMATED COUNT: 15.8 % (ref 11.5–17)
EST. AVERAGE GLUCOSE BLD GHB EST-MCNC: 111.2 MG/DL
GFR SERPLBLD CREATININE-BSD FMLA CKD-EPI: >60 ML/MIN/1.73/M2
GLOBULIN SER-MCNC: 4.5 GM/DL (ref 2.4–3.5)
GLUCOSE SERPL-MCNC: 134 MG/DL (ref 74–100)
GLUCOSE UR QL STRIP: NORMAL
HBA1C MFR BLD: 5.5 %
HCT VFR BLD AUTO: 44.2 % (ref 42–52)
HDLC SERPL-MCNC: 31 MG/DL (ref 35–60)
HGB BLD-MCNC: 14.5 G/DL (ref 14–18)
HGB UR QL STRIP: NEGATIVE
HYALINE CASTS #/AREA URNS LPF: ABNORMAL /LPF
IMM GRANULOCYTES # BLD AUTO: 0.01 X10(3)/MCL (ref 0–0.04)
IMM GRANULOCYTES NFR BLD AUTO: 0.2 %
KETONES UR QL STRIP: NEGATIVE
LDLC SERPL CALC-MCNC: 59 MG/DL (ref 50–140)
LEUKOCYTE ESTERASE UR QL STRIP: NEGATIVE
LYMPHOCYTES # BLD AUTO: 1.2 X10(3)/MCL (ref 0.6–4.6)
LYMPHOCYTES NFR BLD AUTO: 24 %
MCH RBC QN AUTO: 28 PG (ref 27–31)
MCHC RBC AUTO-ENTMCNC: 32.8 G/DL (ref 33–36)
MCV RBC AUTO: 85.3 FL (ref 80–94)
MICROALBUMIN UR-MCNC: 11.1 UG/ML
MONOCYTES # BLD AUTO: 0.53 X10(3)/MCL (ref 0.1–1.3)
MONOCYTES NFR BLD AUTO: 10.6 %
MUCOUS THREADS URNS QL MICRO: ABNORMAL /LPF
NEUTROPHILS # BLD AUTO: 3.19 X10(3)/MCL (ref 2.1–9.2)
NEUTROPHILS NFR BLD AUTO: 63.6 %
NITRITE UR QL STRIP: NEGATIVE
NRBC BLD AUTO-RTO: 0 %
PH UR STRIP: 6 [PH]
PLATELET # BLD AUTO: 276 X10(3)/MCL (ref 130–400)
PMV BLD AUTO: 10 FL (ref 7.4–10.4)
POTASSIUM SERPL-SCNC: 3.2 MMOL/L (ref 3.5–5.1)
PROT SERPL-MCNC: 8.1 GM/DL (ref 6.4–8.3)
PROT UR QL STRIP: ABNORMAL
RBC # BLD AUTO: 5.18 X10(6)/MCL (ref 4.7–6.1)
RBC #/AREA URNS AUTO: ABNORMAL /HPF
SODIUM SERPL-SCNC: 140 MMOL/L (ref 136–145)
SP GR UR STRIP.AUTO: 1.02 (ref 1–1.03)
SQUAMOUS #/AREA URNS LPF: ABNORMAL /HPF
T4 FREE SERPL-MCNC: 0.89 NG/DL (ref 0.7–1.48)
TRIGL SERPL-MCNC: 121 MG/DL (ref 34–140)
TSH SERPL-ACNC: 2.59 UIU/ML (ref 0.35–4.94)
UROBILINOGEN UR STRIP-ACNC: ABNORMAL
VLDLC SERPL CALC-MCNC: 24 MG/DL
WBC # BLD AUTO: 5.01 X10(3)/MCL (ref 4.5–11.5)
WBC #/AREA URNS AUTO: ABNORMAL /HPF

## 2025-08-18 PROCEDURE — 99396 PREV VISIT EST AGE 40-64: CPT | Mod: S$PBB,,,

## 2025-08-18 PROCEDURE — 3044F HG A1C LEVEL LT 7.0%: CPT | Mod: CPTII,,,

## 2025-08-18 PROCEDURE — 99213 OFFICE O/P EST LOW 20 MIN: CPT | Mod: PBBFAC

## 2025-08-18 PROCEDURE — 3008F BODY MASS INDEX DOCD: CPT | Mod: CPTII,,,

## 2025-08-18 PROCEDURE — 80061 LIPID PANEL: CPT

## 2025-08-18 PROCEDURE — 82043 UR ALBUMIN QUANTITATIVE: CPT

## 2025-08-18 PROCEDURE — 84443 ASSAY THYROID STIM HORMONE: CPT

## 2025-08-18 PROCEDURE — 3079F DIAST BP 80-89 MM HG: CPT | Mod: CPTII,,,

## 2025-08-18 PROCEDURE — 3075F SYST BP GE 130 - 139MM HG: CPT | Mod: CPTII,,,

## 2025-08-18 PROCEDURE — 85025 COMPLETE CBC W/AUTO DIFF WBC: CPT

## 2025-08-18 PROCEDURE — 1160F RVW MEDS BY RX/DR IN RCRD: CPT | Mod: CPTII,,,

## 2025-08-18 PROCEDURE — 80053 COMPREHEN METABOLIC PANEL: CPT

## 2025-08-18 PROCEDURE — 83036 HEMOGLOBIN GLYCOSYLATED A1C: CPT

## 2025-08-18 PROCEDURE — 36415 COLL VENOUS BLD VENIPUNCTURE: CPT

## 2025-08-18 PROCEDURE — 82306 VITAMIN D 25 HYDROXY: CPT

## 2025-08-18 PROCEDURE — 81001 URINALYSIS AUTO W/SCOPE: CPT

## 2025-08-18 PROCEDURE — 84439 ASSAY OF FREE THYROXINE: CPT

## 2025-08-18 PROCEDURE — 1159F MED LIST DOCD IN RCRD: CPT | Mod: CPTII,,,

## 2025-08-18 PROCEDURE — 3066F NEPHROPATHY DOC TX: CPT | Mod: CPTII,,,

## 2025-08-18 PROCEDURE — 4010F ACE/ARB THERAPY RXD/TAKEN: CPT | Mod: CPTII,,,

## 2025-08-18 PROCEDURE — 3061F NEG MICROALBUMINURIA REV: CPT | Mod: CPTII,,,

## 2025-08-18 PROCEDURE — 99214 OFFICE O/P EST MOD 30 MIN: CPT | Mod: 25,S$PBB,,

## 2025-08-18 RX ORDER — SERTRALINE HYDROCHLORIDE 25 MG/1
25 TABLET, FILM COATED ORAL NIGHTLY
Qty: 30 TABLET | Refills: 8 | Status: SHIPPED | OUTPATIENT
Start: 2025-08-18 | End: 2026-08-18

## 2025-08-20 PROBLEM — M54.9 MID BACK PAIN ON LEFT SIDE: Status: ACTIVE | Noted: 2025-08-20

## 2025-08-20 PROBLEM — Z00.00 WELL ADULT EXAM: Status: ACTIVE | Noted: 2025-08-20

## 2025-08-20 PROBLEM — R74.8 ELEVATED LIVER ENZYMES: Status: ACTIVE | Noted: 2025-08-20

## 2025-08-22 ENCOUNTER — TELEPHONE (OUTPATIENT)
Dept: HEMATOLOGY/ONCOLOGY | Facility: CLINIC | Age: 45
End: 2025-08-22
Payer: COMMERCIAL

## 2025-08-25 ENCOUNTER — OFFICE VISIT (OUTPATIENT)
Dept: HEMATOLOGY/ONCOLOGY | Facility: CLINIC | Age: 45
End: 2025-08-25
Payer: COMMERCIAL

## 2025-08-25 ENCOUNTER — INFUSION (OUTPATIENT)
Dept: INFUSION THERAPY | Facility: HOSPITAL | Age: 45
End: 2025-08-25
Attending: INTERNAL MEDICINE
Payer: COMMERCIAL

## 2025-08-25 VITALS
HEIGHT: 65 IN | DIASTOLIC BLOOD PRESSURE: 85 MMHG | RESPIRATION RATE: 17 BRPM | HEART RATE: 88 BPM | OXYGEN SATURATION: 99 % | TEMPERATURE: 97 F | BODY MASS INDEX: 26.63 KG/M2 | WEIGHT: 159.81 LBS | SYSTOLIC BLOOD PRESSURE: 140 MMHG

## 2025-08-25 DIAGNOSIS — R74.8 ELEVATED LIVER ENZYMES: ICD-10-CM

## 2025-08-25 DIAGNOSIS — D75.1 SECONDARY ERYTHROCYTOSIS: Primary | ICD-10-CM

## 2025-08-25 DIAGNOSIS — F10.10 ALCOHOL ABUSE: ICD-10-CM

## 2025-08-25 PROCEDURE — 3061F NEG MICROALBUMINURIA REV: CPT | Mod: CPTII,,,

## 2025-08-25 PROCEDURE — 3044F HG A1C LEVEL LT 7.0%: CPT | Mod: CPTII,,,

## 2025-08-25 PROCEDURE — 99214 OFFICE O/P EST MOD 30 MIN: CPT | Mod: PBBFAC

## 2025-08-25 PROCEDURE — 1159F MED LIST DOCD IN RCRD: CPT | Mod: CPTII,,,

## 2025-08-25 PROCEDURE — 3077F SYST BP >= 140 MM HG: CPT | Mod: CPTII,,,

## 2025-08-25 PROCEDURE — 3079F DIAST BP 80-89 MM HG: CPT | Mod: CPTII,,,

## 2025-08-25 PROCEDURE — 3066F NEPHROPATHY DOC TX: CPT | Mod: CPTII,,,

## 2025-08-25 PROCEDURE — 4010F ACE/ARB THERAPY RXD/TAKEN: CPT | Mod: CPTII,,,

## 2025-08-25 PROCEDURE — 99214 OFFICE O/P EST MOD 30 MIN: CPT | Mod: S$PBB,,,

## 2025-08-25 PROCEDURE — 3008F BODY MASS INDEX DOCD: CPT | Mod: CPTII,,,

## 2025-08-25 PROCEDURE — 1160F RVW MEDS BY RX/DR IN RCRD: CPT | Mod: CPTII,,,

## 2025-08-28 DIAGNOSIS — D75.1 SECONDARY ERYTHROCYTOSIS: Primary | ICD-10-CM

## 2025-09-02 ENCOUNTER — HOSPITAL ENCOUNTER (OUTPATIENT)
Dept: RADIOLOGY | Facility: HOSPITAL | Age: 45
Discharge: HOME | End: 2025-09-02
Payer: COMMERCIAL

## 2025-09-02 DIAGNOSIS — R74.8 ELEVATED LIVER ENZYMES: ICD-10-CM
